# Patient Record
Sex: MALE | Race: WHITE | NOT HISPANIC OR LATINO | Employment: OTHER | URBAN - METROPOLITAN AREA
[De-identification: names, ages, dates, MRNs, and addresses within clinical notes are randomized per-mention and may not be internally consistent; named-entity substitution may affect disease eponyms.]

---

## 2017-01-24 RX ORDER — ATORVASTATIN CALCIUM 40 MG/1
TABLET, FILM COATED ORAL
Qty: 30 TABLET | Refills: 11 | Status: SHIPPED | OUTPATIENT
Start: 2017-01-24 | End: 2017-02-06 | Stop reason: SDUPTHER

## 2017-02-04 RX ORDER — PANTOPRAZOLE SODIUM 40 MG/1
TABLET, DELAYED RELEASE ORAL
Qty: 30 TABLET | Refills: 11 | Status: CANCELLED | OUTPATIENT
Start: 2017-02-04

## 2017-02-04 RX ORDER — METOPROLOL SUCCINATE 100 MG/1
TABLET, EXTENDED RELEASE ORAL
Qty: 30 TABLET | Refills: 11 | Status: CANCELLED | OUTPATIENT
Start: 2017-02-04

## 2017-02-04 RX ORDER — CITALOPRAM 10 MG/1
TABLET ORAL
Qty: 30 TABLET | Refills: 11 | Status: CANCELLED | OUTPATIENT
Start: 2017-02-04

## 2017-02-06 RX ORDER — PANTOPRAZOLE SODIUM 40 MG/1
40 TABLET, DELAYED RELEASE ORAL DAILY
Qty: 30 TABLET | Refills: 11 | Status: SHIPPED | OUTPATIENT
Start: 2017-02-06 | End: 2017-10-04

## 2017-02-06 RX ORDER — METOPROLOL SUCCINATE 100 MG/1
100 TABLET, EXTENDED RELEASE ORAL DAILY
Qty: 30 TABLET | Refills: 11 | Status: SHIPPED | OUTPATIENT
Start: 2017-02-06 | End: 2017-10-04 | Stop reason: SDUPTHER

## 2017-02-06 RX ORDER — CLOPIDOGREL BISULFATE 75 MG/1
75 TABLET ORAL DAILY
Qty: 30 TABLET | Refills: 11 | Status: SHIPPED | OUTPATIENT
Start: 2017-02-06 | End: 2017-10-04 | Stop reason: SDUPTHER

## 2017-02-06 RX ORDER — ATORVASTATIN CALCIUM 40 MG/1
40 TABLET, FILM COATED ORAL DAILY
Qty: 30 TABLET | Refills: 11 | Status: SHIPPED | OUTPATIENT
Start: 2017-02-06 | End: 2017-10-04 | Stop reason: SDUPTHER

## 2017-02-06 RX ORDER — CITALOPRAM 10 MG/1
10 TABLET ORAL DAILY
Qty: 30 TABLET | Refills: 11 | Status: SHIPPED | OUTPATIENT
Start: 2017-02-06 | End: 2017-10-04 | Stop reason: DRUGHIGH

## 2017-03-27 ENCOUNTER — OFFICE VISIT (OUTPATIENT)
Dept: INTERNAL MEDICINE | Facility: CLINIC | Age: 72
End: 2017-03-27

## 2017-03-27 VITALS
WEIGHT: 156 LBS | DIASTOLIC BLOOD PRESSURE: 76 MMHG | HEIGHT: 70 IN | BODY MASS INDEX: 22.33 KG/M2 | SYSTOLIC BLOOD PRESSURE: 142 MMHG

## 2017-03-27 DIAGNOSIS — F10.930 ALCOHOL WITHDRAWAL, UNCOMPLICATED (HCC): Primary | ICD-10-CM

## 2017-03-27 DIAGNOSIS — E87.1 HYPONATREMIA: Primary | ICD-10-CM

## 2017-03-27 DIAGNOSIS — F10.10 ALCOHOL ABUSE: ICD-10-CM

## 2017-03-27 LAB
ALBUMIN SERPL-MCNC: 4.7 G/DL (ref 3.2–4.8)
ALBUMIN/GLOB SERPL: 1.9 G/DL (ref 1.5–2.5)
ALP SERPL-CCNC: 69 U/L (ref 25–100)
ALT SERPL W P-5'-P-CCNC: 69 U/L (ref 7–40)
ANION GAP SERPL CALCULATED.3IONS-SCNC: 14 MMOL/L (ref 3–11)
AST SERPL-CCNC: 60 U/L (ref 0–33)
BILIRUB SERPL-MCNC: 1.7 MG/DL (ref 0.3–1.2)
BUN BLD-MCNC: 16 MG/DL (ref 9–23)
BUN/CREAT SERPL: 20 (ref 7–25)
CALCIUM SPEC-SCNC: 9.7 MG/DL (ref 8.7–10.4)
CHLORIDE SERPL-SCNC: 81 MMOL/L (ref 99–109)
CO2 SERPL-SCNC: 27 MMOL/L (ref 20–31)
CREAT BLD-MCNC: 0.8 MG/DL (ref 0.6–1.3)
DEPRECATED RDW RBC AUTO: 54.7 FL (ref 37–54)
ERYTHROCYTE [DISTWIDTH] IN BLOOD BY AUTOMATED COUNT: 18.5 % (ref 11.3–14.5)
GFR SERPL CREATININE-BSD FRML MDRD: 96 ML/MIN/1.73
GLOBULIN UR ELPH-MCNC: 2.5 GM/DL
GLUCOSE BLD-MCNC: 112 MG/DL (ref 70–100)
HCT VFR BLD AUTO: 36.2 % (ref 38.9–50.9)
HGB BLD-MCNC: 12.9 G/DL (ref 13.1–17.5)
MCH RBC QN AUTO: 28.9 PG (ref 27–31)
MCHC RBC AUTO-ENTMCNC: 35.6 G/DL (ref 32–36)
MCV RBC AUTO: 81.2 FL (ref 80–99)
PLATELET # BLD AUTO: 158 10*3/MM3 (ref 150–450)
PMV BLD AUTO: 9.7 FL (ref 6–12)
POTASSIUM BLD-SCNC: 3.7 MMOL/L (ref 3.5–5.5)
PROT SERPL-MCNC: 7.2 G/DL (ref 5.7–8.2)
RBC # BLD AUTO: 4.46 10*6/MM3 (ref 4.2–5.76)
SODIUM BLD-SCNC: 122 MMOL/L (ref 132–146)
WBC NRBC COR # BLD: 5.71 10*3/MM3 (ref 3.5–10.8)

## 2017-03-27 PROCEDURE — 85027 COMPLETE CBC AUTOMATED: CPT | Performed by: PHYSICIAN ASSISTANT

## 2017-03-27 PROCEDURE — 80053 COMPREHEN METABOLIC PANEL: CPT | Performed by: PHYSICIAN ASSISTANT

## 2017-03-27 PROCEDURE — 99213 OFFICE O/P EST LOW 20 MIN: CPT | Performed by: PHYSICIAN ASSISTANT

## 2017-03-27 PROCEDURE — 84425 ASSAY OF VITAMIN B-1: CPT | Performed by: PHYSICIAN ASSISTANT

## 2017-03-28 ENCOUNTER — LAB (OUTPATIENT)
Dept: INTERNAL MEDICINE | Facility: CLINIC | Age: 72
End: 2017-03-28

## 2017-03-28 DIAGNOSIS — E87.1 HYPONATREMIA: ICD-10-CM

## 2017-03-28 LAB
ANION GAP SERPL CALCULATED.3IONS-SCNC: 9 MMOL/L (ref 3–11)
BUN BLD-MCNC: 13 MG/DL (ref 9–23)
BUN/CREAT SERPL: 16.3 (ref 7–25)
CALCIUM SPEC-SCNC: 9.6 MG/DL (ref 8.7–10.4)
CHLORIDE SERPL-SCNC: 82 MMOL/L (ref 99–109)
CO2 SERPL-SCNC: 29 MMOL/L (ref 20–31)
CREAT BLD-MCNC: 0.8 MG/DL (ref 0.6–1.3)
GFR SERPL CREATININE-BSD FRML MDRD: 96 ML/MIN/1.73
GLUCOSE BLD-MCNC: 91 MG/DL (ref 70–100)
POTASSIUM BLD-SCNC: 3.7 MMOL/L (ref 3.5–5.5)
SODIUM BLD-SCNC: 120 MMOL/L (ref 132–146)

## 2017-03-28 PROCEDURE — 80048 BASIC METABOLIC PNL TOTAL CA: CPT | Performed by: PHYSICIAN ASSISTANT

## 2017-03-30 ENCOUNTER — LAB (OUTPATIENT)
Dept: INTERNAL MEDICINE | Facility: CLINIC | Age: 72
End: 2017-03-30

## 2017-03-30 DIAGNOSIS — E87.1 HYPONATREMIA: Primary | ICD-10-CM

## 2017-03-30 LAB
ANION GAP SERPL CALCULATED.3IONS-SCNC: 5 MMOL/L (ref 3–11)
BUN BLD-MCNC: 9 MG/DL (ref 9–23)
BUN/CREAT SERPL: 11.3 (ref 7–25)
CALCIUM SPEC-SCNC: 9.6 MG/DL (ref 8.7–10.4)
CHLORIDE SERPL-SCNC: 90 MMOL/L (ref 99–109)
CO2 SERPL-SCNC: 32 MMOL/L (ref 20–31)
CREAT BLD-MCNC: 0.8 MG/DL (ref 0.6–1.3)
GFR SERPL CREATININE-BSD FRML MDRD: 96 ML/MIN/1.73
GLUCOSE BLD-MCNC: 139 MG/DL (ref 70–100)
POTASSIUM BLD-SCNC: 3.3 MMOL/L (ref 3.5–5.5)
SODIUM BLD-SCNC: 127 MMOL/L (ref 132–146)
VIT B1 BLD-SCNC: 116 NMOL/L (ref 66.5–200)

## 2017-03-30 PROCEDURE — 80048 BASIC METABOLIC PNL TOTAL CA: CPT | Performed by: PHYSICIAN ASSISTANT

## 2017-04-05 ENCOUNTER — HOSPITAL ENCOUNTER (EMERGENCY)
Facility: HOSPITAL | Age: 72
Discharge: LEFT BEFORE DISCHARGE | End: 2017-04-05
Attending: EMERGENCY MEDICINE | Admitting: EMERGENCY MEDICINE

## 2017-04-05 VITALS
HEIGHT: 70 IN | SYSTOLIC BLOOD PRESSURE: 231 MMHG | RESPIRATION RATE: 18 BRPM | WEIGHT: 160 LBS | OXYGEN SATURATION: 99 % | DIASTOLIC BLOOD PRESSURE: 109 MMHG | TEMPERATURE: 97.7 F | HEART RATE: 55 BPM | BODY MASS INDEX: 22.9 KG/M2

## 2017-04-05 DIAGNOSIS — R55 SYNCOPE, UNSPECIFIED SYNCOPE TYPE: Primary | ICD-10-CM

## 2017-04-05 LAB
ALBUMIN SERPL-MCNC: 4.5 G/DL (ref 3.2–4.8)
ALBUMIN/GLOB SERPL: 1.8 G/DL (ref 1.5–2.5)
ALP SERPL-CCNC: 47 U/L (ref 25–100)
ALT SERPL W P-5'-P-CCNC: 37 U/L (ref 7–40)
ANION GAP SERPL CALCULATED.3IONS-SCNC: 2 MMOL/L (ref 3–11)
AST SERPL-CCNC: 29 U/L (ref 0–33)
BASOPHILS # BLD AUTO: 0.03 10*3/MM3 (ref 0–0.2)
BASOPHILS NFR BLD AUTO: 0.7 % (ref 0–1)
BILIRUB SERPL-MCNC: 0.8 MG/DL (ref 0.3–1.2)
BUN BLD-MCNC: 12 MG/DL (ref 9–23)
BUN/CREAT SERPL: 17.1 (ref 7–25)
CALCIUM SPEC-SCNC: 9.5 MG/DL (ref 8.7–10.4)
CHLORIDE SERPL-SCNC: 101 MMOL/L (ref 99–109)
CO2 SERPL-SCNC: 30 MMOL/L (ref 20–31)
CREAT BLD-MCNC: 0.7 MG/DL (ref 0.6–1.3)
DEPRECATED RDW RBC AUTO: 62.5 FL (ref 37–54)
EOSINOPHIL # BLD AUTO: 0.02 10*3/MM3 (ref 0.1–0.3)
EOSINOPHIL NFR BLD AUTO: 0.5 % (ref 0–3)
ERYTHROCYTE [DISTWIDTH] IN BLOOD BY AUTOMATED COUNT: 20.1 % (ref 11.3–14.5)
GFR SERPL CREATININE-BSD FRML MDRD: 111 ML/MIN/1.73
GLOBULIN UR ELPH-MCNC: 2.5 GM/DL
GLUCOSE BLD-MCNC: 101 MG/DL (ref 70–100)
GLUCOSE BLDC GLUCOMTR-MCNC: 106 MG/DL (ref 70–130)
HCT VFR BLD AUTO: 36 % (ref 38.9–50.9)
HGB BLD-MCNC: 12.2 G/DL (ref 13.1–17.5)
HOLD SPECIMEN: NORMAL
HOLD SPECIMEN: NORMAL
IMM GRANULOCYTES # BLD: 0.01 10*3/MM3 (ref 0–0.03)
IMM GRANULOCYTES NFR BLD: 0.2 % (ref 0–0.6)
LYMPHOCYTES # BLD AUTO: 0.49 10*3/MM3 (ref 0.6–4.8)
LYMPHOCYTES NFR BLD AUTO: 12.1 % (ref 24–44)
MAGNESIUM SERPL-MCNC: 1.9 MG/DL (ref 1.3–2.7)
MCH RBC QN AUTO: 29.1 PG (ref 27–31)
MCHC RBC AUTO-ENTMCNC: 33.9 G/DL (ref 32–36)
MCV RBC AUTO: 85.9 FL (ref 80–99)
MONOCYTES # BLD AUTO: 0.52 10*3/MM3 (ref 0–1)
MONOCYTES NFR BLD AUTO: 12.8 % (ref 0–12)
NEUTROPHILS # BLD AUTO: 2.98 10*3/MM3 (ref 1.5–8.3)
NEUTROPHILS NFR BLD AUTO: 73.7 % (ref 41–71)
PLATELET # BLD AUTO: 277 10*3/MM3 (ref 150–450)
PMV BLD AUTO: 8.7 FL (ref 6–12)
POTASSIUM BLD-SCNC: 3.7 MMOL/L (ref 3.5–5.5)
PROT SERPL-MCNC: 7 G/DL (ref 5.7–8.2)
RBC # BLD AUTO: 4.19 10*6/MM3 (ref 4.2–5.76)
SODIUM BLD-SCNC: 133 MMOL/L (ref 132–146)
TROPONIN I SERPL-MCNC: 0 NG/ML (ref 0–0.07)
WBC NRBC COR # BLD: 4.05 10*3/MM3 (ref 3.5–10.8)
WHOLE BLOOD HOLD SPECIMEN: NORMAL
WHOLE BLOOD HOLD SPECIMEN: NORMAL

## 2017-04-05 PROCEDURE — 99284 EMERGENCY DEPT VISIT MOD MDM: CPT

## 2017-04-05 PROCEDURE — 83735 ASSAY OF MAGNESIUM: CPT | Performed by: EMERGENCY MEDICINE

## 2017-04-05 PROCEDURE — 80053 COMPREHEN METABOLIC PANEL: CPT | Performed by: EMERGENCY MEDICINE

## 2017-04-05 PROCEDURE — 85025 COMPLETE CBC W/AUTO DIFF WBC: CPT | Performed by: EMERGENCY MEDICINE

## 2017-04-05 PROCEDURE — 84484 ASSAY OF TROPONIN QUANT: CPT

## 2017-04-05 PROCEDURE — 93005 ELECTROCARDIOGRAM TRACING: CPT | Performed by: EMERGENCY MEDICINE

## 2017-04-05 PROCEDURE — 82962 GLUCOSE BLOOD TEST: CPT

## 2017-04-05 RX ORDER — SODIUM CHLORIDE 0.9 % (FLUSH) 0.9 %
10 SYRINGE (ML) INJECTION AS NEEDED
Status: DISCONTINUED | OUTPATIENT
Start: 2017-04-05 | End: 2017-04-05 | Stop reason: HOSPADM

## 2017-04-06 ENCOUNTER — LAB (OUTPATIENT)
Dept: INTERNAL MEDICINE | Facility: CLINIC | Age: 72
End: 2017-04-06

## 2017-04-06 DIAGNOSIS — E87.1 HYPONATREMIA: ICD-10-CM

## 2017-04-06 DIAGNOSIS — F10.230 ALCOHOL DEPENDENCE WITH UNCOMPLICATED WITHDRAWAL (HCC): ICD-10-CM

## 2017-04-06 LAB
ANION GAP SERPL CALCULATED.3IONS-SCNC: 9 MMOL/L (ref 3–11)
BUN BLD-MCNC: 14 MG/DL (ref 9–23)
BUN/CREAT SERPL: 17.5 (ref 7–25)
CALCIUM SPEC-SCNC: 9.9 MG/DL (ref 8.7–10.4)
CHLORIDE SERPL-SCNC: 93 MMOL/L (ref 99–109)
CO2 SERPL-SCNC: 28 MMOL/L (ref 20–31)
CREAT BLD-MCNC: 0.8 MG/DL (ref 0.6–1.3)
GFR SERPL CREATININE-BSD FRML MDRD: 96 ML/MIN/1.73
GLUCOSE BLD-MCNC: 107 MG/DL (ref 70–100)
POTASSIUM BLD-SCNC: 4.1 MMOL/L (ref 3.5–5.5)
SODIUM BLD-SCNC: 130 MMOL/L (ref 132–146)

## 2017-04-06 PROCEDURE — 80048 BASIC METABOLIC PNL TOTAL CA: CPT | Performed by: PHYSICIAN ASSISTANT

## 2017-04-10 ENCOUNTER — OFFICE VISIT (OUTPATIENT)
Dept: INTERNAL MEDICINE | Facility: CLINIC | Age: 72
End: 2017-04-10

## 2017-04-10 VITALS
WEIGHT: 154 LBS | HEART RATE: 61 BPM | SYSTOLIC BLOOD PRESSURE: 130 MMHG | HEIGHT: 70 IN | DIASTOLIC BLOOD PRESSURE: 82 MMHG | BODY MASS INDEX: 22.05 KG/M2 | OXYGEN SATURATION: 99 %

## 2017-04-10 DIAGNOSIS — E87.1 HYPONATREMIA: Primary | ICD-10-CM

## 2017-04-10 DIAGNOSIS — Z11.59 NEED FOR HEPATITIS C SCREENING TEST: ICD-10-CM

## 2017-04-10 LAB
ANION GAP SERPL CALCULATED.3IONS-SCNC: 11 MMOL/L (ref 3–11)
BUN BLD-MCNC: 11 MG/DL (ref 9–23)
BUN/CREAT SERPL: 15.7 (ref 7–25)
CALCIUM SPEC-SCNC: 9.3 MG/DL (ref 8.7–10.4)
CHLORIDE SERPL-SCNC: 99 MMOL/L (ref 99–109)
CO2 SERPL-SCNC: 24 MMOL/L (ref 20–31)
CREAT BLD-MCNC: 0.7 MG/DL (ref 0.6–1.3)
GFR SERPL CREATININE-BSD FRML MDRD: 111 ML/MIN/1.73
GLUCOSE BLD-MCNC: 98 MG/DL (ref 70–100)
HCV AB SER DONR QL: NORMAL
POTASSIUM BLD-SCNC: 3.7 MMOL/L (ref 3.5–5.5)
SODIUM BLD-SCNC: 134 MMOL/L (ref 132–146)

## 2017-04-10 PROCEDURE — 80048 BASIC METABOLIC PNL TOTAL CA: CPT | Performed by: NURSE PRACTITIONER

## 2017-04-10 PROCEDURE — 86803 HEPATITIS C AB TEST: CPT | Performed by: NURSE PRACTITIONER

## 2017-04-10 PROCEDURE — 99213 OFFICE O/P EST LOW 20 MIN: CPT | Performed by: NURSE PRACTITIONER

## 2017-04-11 ENCOUNTER — TELEPHONE (OUTPATIENT)
Dept: INTERNAL MEDICINE | Facility: CLINIC | Age: 72
End: 2017-04-11

## 2017-10-04 ENCOUNTER — OFFICE VISIT (OUTPATIENT)
Dept: INTERNAL MEDICINE | Facility: CLINIC | Age: 72
End: 2017-10-04

## 2017-10-04 VITALS
RESPIRATION RATE: 16 BRPM | WEIGHT: 151 LBS | OXYGEN SATURATION: 97 % | HEART RATE: 56 BPM | BODY MASS INDEX: 21.62 KG/M2 | DIASTOLIC BLOOD PRESSURE: 64 MMHG | HEIGHT: 70 IN | SYSTOLIC BLOOD PRESSURE: 112 MMHG

## 2017-10-04 DIAGNOSIS — I10 ESSENTIAL HYPERTENSION: ICD-10-CM

## 2017-10-04 DIAGNOSIS — F10.930 WITHDRAWAL SYMPTOMS, ALCOHOL, UNCOMPLICATED (HCC): ICD-10-CM

## 2017-10-04 DIAGNOSIS — B37.0 CANDIDA, ORAL: ICD-10-CM

## 2017-10-04 DIAGNOSIS — R73.03 PREDIABETES: ICD-10-CM

## 2017-10-04 DIAGNOSIS — R45.89 SAD MOOD: ICD-10-CM

## 2017-10-04 DIAGNOSIS — E78.5 HYPERLIPIDEMIA, UNSPECIFIED HYPERLIPIDEMIA TYPE: ICD-10-CM

## 2017-10-04 DIAGNOSIS — Z86.718 HISTORY OF FEMORAL ARTERY THROMBOSIS: Primary | ICD-10-CM

## 2017-10-04 LAB
ALBUMIN SERPL-MCNC: 4 G/DL (ref 3.2–4.8)
ALBUMIN/GLOB SERPL: 1.8 G/DL (ref 1.5–2.5)
ALP SERPL-CCNC: 64 U/L (ref 25–100)
ALT SERPL W P-5'-P-CCNC: 60 U/L (ref 7–40)
ANION GAP SERPL CALCULATED.3IONS-SCNC: 2 MMOL/L (ref 3–11)
AST SERPL-CCNC: 51 U/L (ref 0–33)
BILIRUB SERPL-MCNC: 0.6 MG/DL (ref 0.3–1.2)
BUN BLD-MCNC: 6 MG/DL (ref 9–23)
BUN/CREAT SERPL: 8.6 (ref 7–25)
CALCIUM SPEC-SCNC: 8.9 MG/DL (ref 8.7–10.4)
CHLORIDE SERPL-SCNC: 94 MMOL/L (ref 99–109)
CO2 SERPL-SCNC: 29 MMOL/L (ref 20–31)
CREAT BLD-MCNC: 0.7 MG/DL (ref 0.6–1.3)
GFR SERPL CREATININE-BSD FRML MDRD: 111 ML/MIN/1.73
GLOBULIN UR ELPH-MCNC: 2.2 GM/DL
GLUCOSE BLD-MCNC: 90 MG/DL (ref 70–100)
HBA1C MFR BLD: 5.2 % (ref 4.8–5.6)
POTASSIUM BLD-SCNC: 4.2 MMOL/L (ref 3.5–5.5)
PROT SERPL-MCNC: 6.2 G/DL (ref 5.7–8.2)
SODIUM BLD-SCNC: 125 MMOL/L (ref 132–146)

## 2017-10-04 PROCEDURE — 99214 OFFICE O/P EST MOD 30 MIN: CPT | Performed by: NURSE PRACTITIONER

## 2017-10-04 PROCEDURE — 83036 HEMOGLOBIN GLYCOSYLATED A1C: CPT | Performed by: NURSE PRACTITIONER

## 2017-10-04 PROCEDURE — 80053 COMPREHEN METABOLIC PANEL: CPT | Performed by: NURSE PRACTITIONER

## 2017-10-04 RX ORDER — ATORVASTATIN CALCIUM 40 MG/1
40 TABLET, FILM COATED ORAL DAILY
Qty: 30 TABLET | Refills: 11 | Status: SHIPPED | OUTPATIENT
Start: 2017-10-04 | End: 2018-02-20 | Stop reason: SDUPTHER

## 2017-10-04 RX ORDER — CLOPIDOGREL BISULFATE 75 MG/1
75 TABLET ORAL DAILY
Qty: 30 TABLET | Refills: 11 | Status: SHIPPED | OUTPATIENT
Start: 2017-10-04 | End: 2018-11-08 | Stop reason: SDUPTHER

## 2017-10-04 RX ORDER — CLONIDINE HYDROCHLORIDE 0.1 MG/1
0.1 TABLET ORAL 4 TIMES DAILY
Qty: 120 TABLET | Refills: 11 | Status: SHIPPED | OUTPATIENT
Start: 2017-10-04 | End: 2018-07-23

## 2017-10-04 RX ORDER — METOPROLOL SUCCINATE 100 MG/1
100 TABLET, EXTENDED RELEASE ORAL DAILY
Qty: 30 TABLET | Refills: 11 | Status: SHIPPED | OUTPATIENT
Start: 2017-10-04 | End: 2018-03-14 | Stop reason: SDUPTHER

## 2017-10-04 RX ORDER — CLOTRIMAZOLE 10 MG/1
10 LOZENGE ORAL; TOPICAL
Qty: 140 TABLET | Refills: 2 | Status: SHIPPED | OUTPATIENT
Start: 2017-10-04 | End: 2018-06-25

## 2017-10-04 RX ORDER — CITALOPRAM 20 MG/1
20 TABLET ORAL DAILY
Qty: 30 TABLET | Refills: 11 | Status: SHIPPED | OUTPATIENT
Start: 2017-10-04 | End: 2018-11-01 | Stop reason: SDUPTHER

## 2017-10-04 RX ORDER — LORAZEPAM 1 MG/1
1 TABLET ORAL EVERY 8 HOURS PRN
Qty: 42 TABLET | Refills: 0 | Status: SHIPPED | OUTPATIENT
Start: 2017-10-04 | End: 2018-06-25

## 2017-10-05 DIAGNOSIS — E87.1 HYPONATREMIA: Primary | ICD-10-CM

## 2017-10-26 ENCOUNTER — OFFICE VISIT (OUTPATIENT)
Dept: CARDIAC SURGERY | Facility: CLINIC | Age: 72
End: 2017-10-26

## 2017-10-26 VITALS
WEIGHT: 152 LBS | TEMPERATURE: 98 F | DIASTOLIC BLOOD PRESSURE: 100 MMHG | SYSTOLIC BLOOD PRESSURE: 201 MMHG | HEART RATE: 58 BPM | BODY MASS INDEX: 21.76 KG/M2 | HEIGHT: 70 IN | OXYGEN SATURATION: 99 %

## 2017-10-26 DIAGNOSIS — I73.9 PVD (PERIPHERAL VASCULAR DISEASE) (HCC): Primary | ICD-10-CM

## 2017-10-26 DIAGNOSIS — I73.9 FEMORO-POPLITEAL ARTERY DISEASE (HCC): ICD-10-CM

## 2017-10-26 PROCEDURE — 99203 OFFICE O/P NEW LOW 30 MIN: CPT | Performed by: THORACIC SURGERY (CARDIOTHORACIC VASCULAR SURGERY)

## 2018-02-20 RX ORDER — ATORVASTATIN CALCIUM 40 MG/1
40 TABLET, FILM COATED ORAL DAILY
Qty: 30 TABLET | Refills: 1 | Status: SHIPPED | OUTPATIENT
Start: 2018-02-20 | End: 2018-04-01 | Stop reason: SDUPTHER

## 2018-03-07 ENCOUNTER — TELEPHONE (OUTPATIENT)
Dept: CARDIAC SURGERY | Facility: CLINIC | Age: 73
End: 2018-03-07

## 2018-03-12 ENCOUNTER — TELEPHONE (OUTPATIENT)
Dept: CARDIAC SURGERY | Facility: CLINIC | Age: 73
End: 2018-03-12

## 2018-03-12 DIAGNOSIS — I73.9 PVD (PERIPHERAL VASCULAR DISEASE) (HCC): Primary | ICD-10-CM

## 2018-03-14 ENCOUNTER — OFFICE VISIT (OUTPATIENT)
Dept: INTERNAL MEDICINE | Facility: CLINIC | Age: 73
End: 2018-03-14

## 2018-03-14 VITALS
DIASTOLIC BLOOD PRESSURE: 84 MMHG | BODY MASS INDEX: 22.99 KG/M2 | SYSTOLIC BLOOD PRESSURE: 148 MMHG | HEIGHT: 70 IN | HEART RATE: 61 BPM | OXYGEN SATURATION: 99 % | WEIGHT: 160.6 LBS

## 2018-03-14 DIAGNOSIS — E78.5 HYPERLIPIDEMIA, UNSPECIFIED HYPERLIPIDEMIA TYPE: ICD-10-CM

## 2018-03-14 DIAGNOSIS — I10 ESSENTIAL HYPERTENSION: ICD-10-CM

## 2018-03-14 DIAGNOSIS — K21.9 GASTROESOPHAGEAL REFLUX DISEASE, ESOPHAGITIS PRESENCE NOT SPECIFIED: Primary | ICD-10-CM

## 2018-03-14 DIAGNOSIS — R73.03 PREDIABETES: ICD-10-CM

## 2018-03-14 LAB
ARTICHOKE IGE QN: 52 MG/DL (ref 0–130)
CHOLEST SERPL-MCNC: 134 MG/DL (ref 0–200)
EXPIRATION DATE: NORMAL
GLUCOSE BLDC GLUCOMTR-MCNC: 95 MG/DL (ref 70–130)
HBA1C MFR BLD: 5.7 %
HDLC SERPL-MCNC: 77 MG/DL (ref 40–60)
Lab: NORMAL
TRIGL SERPL-MCNC: 56 MG/DL (ref 0–150)

## 2018-03-14 PROCEDURE — 80061 LIPID PANEL: CPT | Performed by: NURSE PRACTITIONER

## 2018-03-14 PROCEDURE — 83036 HEMOGLOBIN GLYCOSYLATED A1C: CPT | Performed by: NURSE PRACTITIONER

## 2018-03-14 PROCEDURE — 99214 OFFICE O/P EST MOD 30 MIN: CPT | Performed by: NURSE PRACTITIONER

## 2018-03-14 PROCEDURE — 82947 ASSAY GLUCOSE BLOOD QUANT: CPT | Performed by: NURSE PRACTITIONER

## 2018-03-14 RX ORDER — PANTOPRAZOLE SODIUM 40 MG/1
40 TABLET, DELAYED RELEASE ORAL DAILY
Qty: 30 TABLET | Refills: 11 | Status: SHIPPED | OUTPATIENT
Start: 2018-03-14 | End: 2018-10-25

## 2018-03-14 RX ORDER — PANTOPRAZOLE SODIUM 40 MG/1
TABLET, DELAYED RELEASE ORAL
COMMUNITY
Start: 2017-12-18 | End: 2018-03-14 | Stop reason: SDUPTHER

## 2018-03-14 RX ORDER — METOPROLOL SUCCINATE 100 MG/1
100 TABLET, EXTENDED RELEASE ORAL DAILY
Qty: 30 TABLET | Refills: 11 | Status: SHIPPED | OUTPATIENT
Start: 2018-03-14 | End: 2019-02-04 | Stop reason: SDUPTHER

## 2018-04-01 RX ORDER — ATORVASTATIN CALCIUM 40 MG/1
40 TABLET, FILM COATED ORAL DAILY
Qty: 90 TABLET | Refills: 1 | Status: SHIPPED | OUTPATIENT
Start: 2018-04-01 | End: 2018-07-23

## 2018-04-03 ENCOUNTER — HOSPITAL ENCOUNTER (OUTPATIENT)
Dept: CARDIOLOGY | Facility: HOSPITAL | Age: 73
Discharge: HOME | End: 2018-04-03
Admitting: PHYSICIAN ASSISTANT

## 2018-04-03 VITALS
WEIGHT: 160 LBS | SYSTOLIC BLOOD PRESSURE: 191 MMHG | HEIGHT: 70 IN | BODY MASS INDEX: 22.9 KG/M2 | DIASTOLIC BLOOD PRESSURE: 96 MMHG

## 2018-04-03 DIAGNOSIS — I73.9 PVD (PERIPHERAL VASCULAR DISEASE) (HCC): ICD-10-CM

## 2018-04-03 PROCEDURE — 93925 LOWER EXTREMITY STUDY: CPT | Performed by: INTERNAL MEDICINE

## 2018-04-03 PROCEDURE — 93925 LOWER EXTREMITY STUDY: CPT

## 2018-04-04 LAB
BH CV GRAFT BRACHIAL PRESSURE LEFT: 189 MMHG
BH CV GRAFT BRACHIAL PRESSURE RIGHT: 191 MMHG
BH CV LEA LEFT ANT TIBIAL A DISTAL PSV: 47 CM/S
BH CV LEA LEFT ANT TIBIAL A MID PSV: 180 CM/S
BH CV LEA LEFT ANT TIBIAL A PROX PSV: 88 CM/S
BH CV LEA LEFT CFA DISTAL PSV: 84 CM/S
BH CV LEA LEFT CFA PROX PSV: 74 CM/S
BH CV LEA LEFT DFA PROX PSV: 62 CM/S
BH CV LEA LEFT PERONEAL  MID PSV: 37 CM/S
BH CV LEA LEFT PERONEAL  PROX PSV: 45 CM/S
BH CV LEA LEFT POPITEAL A  DISTAL PSV: 347 CM/S
BH CV LEA LEFT POPITEAL A  MID PSV: 101 CM/S
BH CV LEA LEFT POPITEAL A  PROX PSV: 68 CM/S
BH CV LEA LEFT PTA DISTAL PSV: 60 CM/S
BH CV LEA LEFT PTA MID PSV: 70 CM/S
BH CV LEA LEFT PTA PRESSURE: 191 MMHG
BH CV LEA LEFT PTA PROX PSV: 63 CM/S
BH CV LEA LEFT SFA DISTAL PSV: 87 CM/S
BH CV LEA LEFT SFA MID PSV: 76 CM/S
BH CV LEA LEFT SFA PROX PSV: 85 CM/S
BH CV LEA RIGHT ANT TIBIAL A DISTAL PSV: 45 CM/S
BH CV LEA RIGHT ANT TIBIAL A MID PSV: 48 CM/S
BH CV LEA RIGHT ANT TIBIAL A PROX PSV: 79 CM/S
BH CV LEA RIGHT CFA DISTAL PSV: 156 CM/S
BH CV LEA RIGHT CFA PROX PSV: 116 CM/S
BH CV LEA RIGHT DFA PROX PSV: 109 CM/S
BH CV LEA RIGHT PERONEAL  MID PSV: 41 CM/S
BH CV LEA RIGHT PERONEAL  PROX PSV: 40 CM/S
BH CV LEA RIGHT POPITEAL A  DISTAL PSV: 87 CM/S
BH CV LEA RIGHT PTA DISTAL PSV: 57 CM/S
BH CV LEA RIGHT PTA MID PSV: 83 CM/S
BH CV LEA RIGHT PTA PRESSURE: 194 MMHG
BH CV LEA RIGHT PTA PROX PSV: 68 CM/S
BH CV LEA RIGHT SFA DISTAL PSV: 15 CM/S
BH CV LEA RIGHT SFA MID PSV: 102 CM/S
BH CV LEA RIGHT SFA PROX PSV: 131 CM/S
BH CV LOWER ARTERIAL LEFT ABI RATIO: 1
BH CV LOWER ARTERIAL RIGHT ABI RATIO: 1

## 2018-04-20 ENCOUNTER — OFFICE VISIT (OUTPATIENT)
Dept: CARDIAC SURGERY | Facility: CLINIC | Age: 73
End: 2018-04-20

## 2018-04-20 VITALS
HEART RATE: 58 BPM | DIASTOLIC BLOOD PRESSURE: 90 MMHG | SYSTOLIC BLOOD PRESSURE: 200 MMHG | TEMPERATURE: 97.7 F | HEIGHT: 70 IN | BODY MASS INDEX: 23.34 KG/M2 | WEIGHT: 163 LBS | OXYGEN SATURATION: 98 %

## 2018-04-20 DIAGNOSIS — I73.9 PVD (PERIPHERAL VASCULAR DISEASE) (HCC): Primary | ICD-10-CM

## 2018-04-20 DIAGNOSIS — I73.9 FEMORO-POPLITEAL ARTERY DISEASE (HCC): ICD-10-CM

## 2018-04-20 PROCEDURE — 99213 OFFICE O/P EST LOW 20 MIN: CPT | Performed by: THORACIC SURGERY (CARDIOTHORACIC VASCULAR SURGERY)

## 2018-06-25 ENCOUNTER — TELEPHONE (OUTPATIENT)
Dept: INTERNAL MEDICINE | Facility: CLINIC | Age: 73
End: 2018-06-25

## 2018-06-25 ENCOUNTER — OFFICE VISIT (OUTPATIENT)
Dept: INTERNAL MEDICINE | Facility: CLINIC | Age: 73
End: 2018-06-25

## 2018-06-25 VITALS
SYSTOLIC BLOOD PRESSURE: 144 MMHG | HEART RATE: 63 BPM | OXYGEN SATURATION: 98 % | HEIGHT: 70 IN | BODY MASS INDEX: 22.19 KG/M2 | DIASTOLIC BLOOD PRESSURE: 86 MMHG | WEIGHT: 155 LBS

## 2018-06-25 DIAGNOSIS — Z13.6 SCREENING FOR AAA (ABDOMINAL AORTIC ANEURYSM): ICD-10-CM

## 2018-06-25 DIAGNOSIS — F17.200 TOBACCO USE DISORDER: ICD-10-CM

## 2018-06-25 DIAGNOSIS — R63.4 RECENT UNEXPLAINED WEIGHT LOSS: ICD-10-CM

## 2018-06-25 DIAGNOSIS — Z12.11 SCREENING FOR COLON CANCER: ICD-10-CM

## 2018-06-25 DIAGNOSIS — Z87.891 PERSONAL HISTORY OF TOBACCO USE, PRESENTING HAZARDS TO HEALTH: ICD-10-CM

## 2018-06-25 DIAGNOSIS — I10 ESSENTIAL HYPERTENSION: ICD-10-CM

## 2018-06-25 DIAGNOSIS — Z13.6 SCREENING FOR AAA (AORTIC ABDOMINAL ANEURYSM): ICD-10-CM

## 2018-06-25 DIAGNOSIS — R73.03 PREDIABETES: Primary | ICD-10-CM

## 2018-06-25 DIAGNOSIS — J42 CHRONIC BRONCHITIS, UNSPECIFIED CHRONIC BRONCHITIS TYPE (HCC): ICD-10-CM

## 2018-06-25 LAB
GLUCOSE BLDC GLUCOMTR-MCNC: 88 MG/DL (ref 70–130)
HBA1C MFR BLD: 5.2 %

## 2018-06-25 PROCEDURE — 99214 OFFICE O/P EST MOD 30 MIN: CPT | Performed by: NURSE PRACTITIONER

## 2018-06-25 PROCEDURE — 82947 ASSAY GLUCOSE BLOOD QUANT: CPT | Performed by: NURSE PRACTITIONER

## 2018-06-25 PROCEDURE — 83036 HEMOGLOBIN GLYCOSYLATED A1C: CPT | Performed by: NURSE PRACTITIONER

## 2018-06-25 RX ORDER — LISINOPRIL 10 MG/1
10 TABLET ORAL DAILY
Qty: 30 TABLET | Refills: 2 | Status: SHIPPED | OUTPATIENT
Start: 2018-06-25 | End: 2018-08-08 | Stop reason: SDUPTHER

## 2018-06-28 ENCOUNTER — LAB (OUTPATIENT)
Dept: INTERNAL MEDICINE | Facility: CLINIC | Age: 73
End: 2018-06-28

## 2018-06-28 DIAGNOSIS — R63.4 RECENT UNEXPLAINED WEIGHT LOSS: ICD-10-CM

## 2018-06-28 LAB
ALBUMIN SERPL-MCNC: 4.24 G/DL (ref 3.2–4.8)
ALBUMIN/GLOB SERPL: 2 G/DL (ref 1.5–2.5)
ALP SERPL-CCNC: 71 U/L (ref 25–100)
ALT SERPL W P-5'-P-CCNC: 83 U/L (ref 7–40)
ANION GAP SERPL CALCULATED.3IONS-SCNC: 10 MMOL/L (ref 3–11)
AST SERPL-CCNC: 74 U/L (ref 0–33)
BASOPHILS # BLD AUTO: 0.03 10*3/MM3 (ref 0–0.2)
BASOPHILS NFR BLD AUTO: 1 % (ref 0–1)
BILIRUB SERPL-MCNC: 0.6 MG/DL (ref 0.3–1.2)
BUN BLD-MCNC: <5 MG/DL (ref 9–23)
BUN/CREAT SERPL: ABNORMAL (ref 7–25)
CALCIUM SPEC-SCNC: 8.8 MG/DL (ref 8.7–10.4)
CHLORIDE SERPL-SCNC: 98 MMOL/L (ref 99–109)
CO2 SERPL-SCNC: 26 MMOL/L (ref 20–31)
CREAT BLD-MCNC: 0.76 MG/DL (ref 0.6–1.3)
DEPRECATED RDW RBC AUTO: 54.9 FL (ref 37–54)
EOSINOPHIL # BLD AUTO: 0.05 10*3/MM3 (ref 0–0.3)
EOSINOPHIL NFR BLD AUTO: 1.7 % (ref 0–3)
ERYTHROCYTE [DISTWIDTH] IN BLOOD BY AUTOMATED COUNT: 17.6 % (ref 11.3–14.5)
GFR SERPL CREATININE-BSD FRML MDRD: 101 ML/MIN/1.73
GLOBULIN UR ELPH-MCNC: 2.2 GM/DL
GLUCOSE BLD-MCNC: 85 MG/DL (ref 70–100)
HCT VFR BLD AUTO: 37 % (ref 38.9–50.9)
HGB BLD-MCNC: 12.8 G/DL (ref 13.1–17.5)
IMM GRANULOCYTES # BLD: 0.01 10*3/MM3 (ref 0–0.03)
IMM GRANULOCYTES NFR BLD: 0.3 % (ref 0–0.6)
LYMPHOCYTES # BLD AUTO: 0.82 10*3/MM3 (ref 0.6–4.8)
LYMPHOCYTES NFR BLD AUTO: 28.6 % (ref 24–44)
MCH RBC QN AUTO: 29.6 PG (ref 27–31)
MCHC RBC AUTO-ENTMCNC: 34.6 G/DL (ref 32–36)
MCV RBC AUTO: 85.6 FL (ref 80–99)
MONOCYTES # BLD AUTO: 0.38 10*3/MM3 (ref 0–1)
MONOCYTES NFR BLD AUTO: 13.2 % (ref 0–12)
NEUTROPHILS # BLD AUTO: 1.59 10*3/MM3 (ref 1.5–8.3)
NEUTROPHILS NFR BLD AUTO: 55.5 % (ref 41–71)
PLATELET # BLD AUTO: 175 10*3/MM3 (ref 150–450)
PMV BLD AUTO: 10.3 FL (ref 6–12)
POTASSIUM BLD-SCNC: 4.3 MMOL/L (ref 3.5–5.5)
PROT SERPL-MCNC: 6.4 G/DL (ref 5.7–8.2)
RBC # BLD AUTO: 4.32 10*6/MM3 (ref 4.2–5.76)
SODIUM BLD-SCNC: 134 MMOL/L (ref 132–146)
WBC NRBC COR # BLD: 2.87 10*3/MM3 (ref 3.5–10.8)

## 2018-06-28 PROCEDURE — 80053 COMPREHEN METABOLIC PANEL: CPT | Performed by: NURSE PRACTITIONER

## 2018-06-28 PROCEDURE — 85025 COMPLETE CBC W/AUTO DIFF WBC: CPT | Performed by: NURSE PRACTITIONER

## 2018-06-29 DIAGNOSIS — R74.8 ELEVATED LIVER ENZYMES: Primary | ICD-10-CM

## 2018-07-16 ENCOUNTER — TELEPHONE (OUTPATIENT)
Dept: INTERNAL MEDICINE | Facility: CLINIC | Age: 73
End: 2018-07-16

## 2018-07-23 ENCOUNTER — OFFICE VISIT (OUTPATIENT)
Dept: INTERNAL MEDICINE | Facility: CLINIC | Age: 73
End: 2018-07-23

## 2018-07-23 ENCOUNTER — HOSPITAL ENCOUNTER (OUTPATIENT)
Dept: GENERAL RADIOLOGY | Facility: HOSPITAL | Age: 73
Discharge: HOME | End: 2018-07-23

## 2018-07-23 ENCOUNTER — HOSPITAL ENCOUNTER (OUTPATIENT)
Dept: ULTRASOUND IMAGING | Facility: HOSPITAL | Age: 73
Discharge: HOME | End: 2018-07-23
Admitting: NURSE PRACTITIONER

## 2018-07-23 VITALS
HEIGHT: 70 IN | BODY MASS INDEX: 21.9 KG/M2 | DIASTOLIC BLOOD PRESSURE: 86 MMHG | WEIGHT: 153 LBS | HEART RATE: 61 BPM | SYSTOLIC BLOOD PRESSURE: 148 MMHG | OXYGEN SATURATION: 99 %

## 2018-07-23 DIAGNOSIS — Z13.6 SCREENING FOR AAA (ABDOMINAL AORTIC ANEURYSM): ICD-10-CM

## 2018-07-23 DIAGNOSIS — Z13.6 SCREENING FOR AAA (AORTIC ABDOMINAL ANEURYSM): ICD-10-CM

## 2018-07-23 DIAGNOSIS — I10 ESSENTIAL HYPERTENSION: ICD-10-CM

## 2018-07-23 DIAGNOSIS — R73.03 PREDIABETES: ICD-10-CM

## 2018-07-23 DIAGNOSIS — R74.8 ELEVATED LIVER ENZYMES: Primary | ICD-10-CM

## 2018-07-23 DIAGNOSIS — Z87.891 PERSONAL HISTORY OF TOBACCO USE, PRESENTING HAZARDS TO HEALTH: ICD-10-CM

## 2018-07-23 DIAGNOSIS — F17.200 TOBACCO USE DISORDER: ICD-10-CM

## 2018-07-23 LAB
ALBUMIN SERPL-MCNC: 4.19 G/DL (ref 3.2–4.8)
ALBUMIN/GLOB SERPL: 2 G/DL (ref 1.5–2.5)
ALP SERPL-CCNC: 67 U/L (ref 25–100)
ALT SERPL W P-5'-P-CCNC: 33 U/L (ref 7–40)
ANION GAP SERPL CALCULATED.3IONS-SCNC: 11 MMOL/L (ref 3–11)
AST SERPL-CCNC: 40 U/L (ref 0–33)
BILIRUB SERPL-MCNC: 0.5 MG/DL (ref 0.3–1.2)
BUN BLD-MCNC: 5 MG/DL (ref 9–23)
BUN/CREAT SERPL: 7.5 (ref 7–25)
CALCIUM SPEC-SCNC: 9.2 MG/DL (ref 8.7–10.4)
CHLORIDE SERPL-SCNC: 92 MMOL/L (ref 99–109)
CO2 SERPL-SCNC: 25 MMOL/L (ref 20–31)
CREAT BLD-MCNC: 0.67 MG/DL (ref 0.6–1.3)
GFR SERPL CREATININE-BSD FRML MDRD: 117 ML/MIN/1.73
GLOBULIN UR ELPH-MCNC: 2.1 GM/DL
GLUCOSE BLD-MCNC: 86 MG/DL (ref 70–100)
POTASSIUM BLD-SCNC: 4.3 MMOL/L (ref 3.5–5.5)
PROT SERPL-MCNC: 6.3 G/DL (ref 5.7–8.2)
SODIUM BLD-SCNC: 128 MMOL/L (ref 132–146)

## 2018-07-23 PROCEDURE — 80053 COMPREHEN METABOLIC PANEL: CPT | Performed by: NURSE PRACTITIONER

## 2018-07-23 PROCEDURE — 99213 OFFICE O/P EST LOW 20 MIN: CPT | Performed by: NURSE PRACTITIONER

## 2018-07-23 PROCEDURE — 71046 X-RAY EXAM CHEST 2 VIEWS: CPT

## 2018-07-23 PROCEDURE — 76706 US ABDL AORTA SCREEN AAA: CPT

## 2018-07-23 RX ORDER — GLUCOSAMINE HCL 500 MG
TABLET ORAL
COMMUNITY
End: 2018-10-25

## 2018-07-23 RX ORDER — MULTIPLE VITAMINS W/ MINERALS TAB 9MG-400MCG
1 TAB ORAL DAILY
COMMUNITY
End: 2018-10-25

## 2018-08-01 ENCOUNTER — OFFICE VISIT (OUTPATIENT)
Dept: INTERNAL MEDICINE | Facility: CLINIC | Age: 73
End: 2018-08-01

## 2018-08-01 VITALS
BODY MASS INDEX: 21.9 KG/M2 | HEART RATE: 72 BPM | OXYGEN SATURATION: 99 % | HEIGHT: 70 IN | SYSTOLIC BLOOD PRESSURE: 166 MMHG | DIASTOLIC BLOOD PRESSURE: 100 MMHG | WEIGHT: 153 LBS

## 2018-08-01 DIAGNOSIS — E87.1 HYPONATREMIA: ICD-10-CM
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
## 2018-08-01 LAB
ANION GAP SERPL CALCULATED.3IONS-SCNC: 10 MMOL/L (ref 3–11)
BUN BLD-MCNC: 5 MG/DL (ref 9–23)
BUN/CREAT SERPL: 6.9 (ref 7–25)
CALCIUM SPEC-SCNC: 9.1 MG/DL (ref 8.7–10.4)
CHLORIDE SERPL-SCNC: 91 MMOL/L (ref 99–109)
CO2 SERPL-SCNC: 26 MMOL/L (ref 20–31)
CREAT BLD-MCNC: 0.72 MG/DL (ref 0.6–1.3)
GFR SERPL CREATININE-BSD FRML MDRD: 108 ML/MIN/1.73
GLUCOSE BLD-MCNC: 88 MG/DL (ref 70–100)
POTASSIUM BLD-SCNC: 4.2 MMOL/L (ref 3.5–5.5)
SODIUM BLD-SCNC: 127 MMOL/L (ref 132–146)

## 2018-08-01 PROCEDURE — 80048 BASIC METABOLIC PNL TOTAL CA: CPT | Performed by: NURSE PRACTITIONER

## 2018-08-01 PROCEDURE — 93000 ELECTROCARDIOGRAM COMPLETE: CPT | Performed by: NURSE PRACTITIONER

## 2018-08-01 PROCEDURE — 99214 OFFICE O/P EST MOD 30 MIN: CPT | Performed by: NURSE PRACTITIONER

## 2018-08-08 ENCOUNTER — HOSPITAL ENCOUNTER (OUTPATIENT)
Dept: PHYSICAL THERAPY | Facility: HOSPITAL | Age: 73
Setting detail: THERAPIES SERIES
Discharge: HOME | End: 2018-08-08
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
## 2018-08-08 DIAGNOSIS — R26.89 BALANCE DISORDER: ICD-10-CM

## 2018-08-08 DIAGNOSIS — R29.898 WEAKNESS OF BOTH LOWER EXTREMITIES: Primary | ICD-10-CM

## 2018-08-08 DIAGNOSIS — I10 ESSENTIAL HYPERTENSION: ICD-10-CM

## 2018-08-08 PROCEDURE — G8982 BODY POS GOAL STATUS: HCPCS

## 2018-08-08 PROCEDURE — G8981 BODY POS CURRENT STATUS: HCPCS

## 2018-08-08 PROCEDURE — 97161 PT EVAL LOW COMPLEX 20 MIN: CPT

## 2018-08-08 RX ORDER — LISINOPRIL 10 MG/1
10 TABLET ORAL DAILY
Qty: 90 TABLET | Refills: 0 | Status: SHIPPED | OUTPATIENT
Start: 2018-08-08 | End: 2018-11-26

## 2018-08-09 ENCOUNTER — HOSPITAL ENCOUNTER (OUTPATIENT)
Dept: GENERAL RADIOLOGY | Facility: HOSPITAL | Age: 73
End: 2018-08-09

## 2018-08-09 ENCOUNTER — HOSPITAL ENCOUNTER (OUTPATIENT)
Dept: MRI IMAGING | Facility: HOSPITAL | Age: 73
Discharge: HOME | End: 2018-08-09
Admitting: NURSE PRACTITIONER
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
## 2018-08-09 PROCEDURE — 70551 MRI BRAIN STEM W/O DYE: CPT

## 2018-08-10 RX ORDER — AMOXICILLIN 500 MG/1
500 CAPSULE ORAL 3 TIMES DAILY
Qty: 30 CAPSULE | Refills: 0 | Status: SHIPPED | OUTPATIENT
Start: 2018-08-10 | End: 2018-08-20

## 2018-08-13 ENCOUNTER — HOSPITAL ENCOUNTER (OUTPATIENT)
Dept: PHYSICAL THERAPY | Facility: HOSPITAL | Age: 73
Setting detail: THERAPIES SERIES
Discharge: HOME | End: 2018-08-13

## 2018-08-13 DIAGNOSIS — R29.898 WEAKNESS OF BOTH LOWER EXTREMITIES: ICD-10-CM
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
## 2018-08-13 DIAGNOSIS — R26.89 BALANCE DISORDER: ICD-10-CM

## 2018-08-13 PROCEDURE — 97110 THERAPEUTIC EXERCISES: CPT

## 2018-08-13 PROCEDURE — 97112 NEUROMUSCULAR REEDUCATION: CPT

## 2018-08-15 ENCOUNTER — HOSPITAL ENCOUNTER (OUTPATIENT)
Dept: PHYSICAL THERAPY | Facility: HOSPITAL | Age: 73
Setting detail: THERAPIES SERIES
Discharge: HOME | End: 2018-08-15

## 2018-08-15 DIAGNOSIS — R26.89 BALANCE DISORDER: ICD-10-CM

## 2018-08-15 DIAGNOSIS — R29.898 WEAKNESS OF BOTH LOWER EXTREMITIES: ICD-10-CM
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
## 2018-08-15 PROCEDURE — 97112 NEUROMUSCULAR REEDUCATION: CPT

## 2018-08-17 ENCOUNTER — OFFICE VISIT (OUTPATIENT)
Dept: INTERNAL MEDICINE | Facility: CLINIC | Age: 73
End: 2018-08-17

## 2018-08-17 VITALS
BODY MASS INDEX: 21.55 KG/M2 | OXYGEN SATURATION: 100 % | SYSTOLIC BLOOD PRESSURE: 134 MMHG | DIASTOLIC BLOOD PRESSURE: 84 MMHG | WEIGHT: 150.2 LBS | HEART RATE: 81 BPM

## 2018-08-17 DIAGNOSIS — J43.9 PULMONARY EMPHYSEMA, UNSPECIFIED EMPHYSEMA TYPE (HCC): Primary | ICD-10-CM

## 2018-08-17 DIAGNOSIS — E87.1 HYPONATREMIA: ICD-10-CM

## 2018-08-17 DIAGNOSIS — F10.29 ALCOHOL DEPENDENCE WITH UNSPECIFIED ALCOHOL-INDUCED DISORDER (HCC): ICD-10-CM

## 2018-08-17 LAB
ANION GAP SERPL CALCULATED.3IONS-SCNC: 6 MMOL/L (ref 3–11)
BUN BLD-MCNC: 5 MG/DL (ref 9–23)
BUN/CREAT SERPL: 7.4 (ref 7–25)
CALCIUM SPEC-SCNC: 9.2 MG/DL (ref 8.7–10.4)
CHLORIDE SERPL-SCNC: 95 MMOL/L (ref 99–109)
CO2 SERPL-SCNC: 27 MMOL/L (ref 20–31)
CREAT BLD-MCNC: 0.68 MG/DL (ref 0.6–1.3)
GFR SERPL CREATININE-BSD FRML MDRD: 115 ML/MIN/1.73
GLUCOSE BLD-MCNC: 91 MG/DL (ref 70–100)
MAGNESIUM SERPL-MCNC: 1.7 MG/DL (ref 1.3–2.7)
POTASSIUM BLD-SCNC: 3.9 MMOL/L (ref 3.5–5.5)
SODIUM BLD-SCNC: 128 MMOL/L (ref 132–146)
VIT B12 BLD-MCNC: 388 PG/ML (ref 211–911)

## 2018-08-17 PROCEDURE — 99213 OFFICE O/P EST LOW 20 MIN: CPT | Performed by: NURSE PRACTITIONER

## 2018-08-17 PROCEDURE — 82607 VITAMIN B-12: CPT | Performed by: NURSE PRACTITIONER

## 2018-08-17 PROCEDURE — 83735 ASSAY OF MAGNESIUM: CPT | Performed by: NURSE PRACTITIONER

## 2018-08-17 PROCEDURE — 84425 ASSAY OF VITAMIN B-1: CPT | Performed by: NURSE PRACTITIONER

## 2018-08-17 PROCEDURE — 80048 BASIC METABOLIC PNL TOTAL CA: CPT | Performed by: NURSE PRACTITIONER

## 2018-08-17 RX ORDER — ALBUTEROL SULFATE 90 UG/1
2 AEROSOL, METERED RESPIRATORY (INHALATION) EVERY 6 HOURS PRN
Qty: 1 INHALER | Refills: 2 | Status: SHIPPED | OUTPATIENT
Start: 2018-08-17 | End: 2018-10-25 | Stop reason: SDUPTHER

## 2018-08-20 ENCOUNTER — HOSPITAL ENCOUNTER (OUTPATIENT)
Dept: PHYSICAL THERAPY | Facility: HOSPITAL | Age: 73
Setting detail: THERAPIES SERIES
Discharge: HOME | End: 2018-08-20

## 2018-08-20 ENCOUNTER — HOSPITAL ENCOUNTER (EMERGENCY)
Facility: HOSPITAL | Age: 73
Discharge: LEFT BEFORE DISCHARGE | End: 2018-08-20
Attending: EMERGENCY MEDICINE | Admitting: EMERGENCY MEDICINE

## 2018-08-20 VITALS
OXYGEN SATURATION: 96 % | TEMPERATURE: 97.7 F | BODY MASS INDEX: 21.62 KG/M2 | RESPIRATION RATE: 16 BRPM | SYSTOLIC BLOOD PRESSURE: 114 MMHG | HEART RATE: 75 BPM | DIASTOLIC BLOOD PRESSURE: 61 MMHG | HEIGHT: 70 IN | WEIGHT: 151 LBS

## 2018-08-20 DIAGNOSIS — R55 SYNCOPE AND COLLAPSE: Primary | ICD-10-CM

## 2018-08-20 DIAGNOSIS — R29.898 WEAKNESS OF BOTH LOWER EXTREMITIES: ICD-10-CM
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
## 2018-08-20 DIAGNOSIS — R26.89 BALANCE DISORDER: ICD-10-CM

## 2018-08-20 PROCEDURE — 97110 THERAPEUTIC EXERCISES: CPT

## 2018-08-20 PROCEDURE — 93005 ELECTROCARDIOGRAM TRACING: CPT | Performed by: EMERGENCY MEDICINE

## 2018-08-20 PROCEDURE — 99284 EMERGENCY DEPT VISIT MOD MDM: CPT

## 2018-08-20 RX ORDER — SODIUM CHLORIDE 0.9 % (FLUSH) 0.9 %
10 SYRINGE (ML) INJECTION AS NEEDED
Status: DISCONTINUED | OUTPATIENT
Start: 2018-08-20 | End: 2018-08-20 | Stop reason: HOSPADM

## 2018-08-22 LAB — VIT B1 BLD-SCNC: 71.7 NMOL/L (ref 66.5–200)

## 2018-08-27 ENCOUNTER — HOSPITAL ENCOUNTER (OUTPATIENT)
Dept: PHYSICAL THERAPY | Facility: HOSPITAL | Age: 73
Discharge: HOME | End: 2018-08-27

## 2018-08-27 DIAGNOSIS — R26.89 BALANCE DISORDER: ICD-10-CM
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
## 2018-08-27 DIAGNOSIS — R29.898 WEAKNESS OF BOTH LOWER EXTREMITIES: ICD-10-CM

## 2018-08-28 ENCOUNTER — DOCUMENTATION (OUTPATIENT)
Dept: PHYSICAL THERAPY | Facility: HOSPITAL | Age: 73
End: 2018-08-28

## 2018-09-13 ENCOUNTER — TELEPHONE (OUTPATIENT)
Dept: INTERNAL MEDICINE | Facility: CLINIC | Age: 73
End: 2018-09-13

## 2018-09-13 DIAGNOSIS — R63.4 WEIGHT LOSS: Primary | ICD-10-CM

## 2018-09-18 ENCOUNTER — TELEPHONE (OUTPATIENT)
Dept: CARDIAC SURGERY | Facility: CLINIC | Age: 73
End: 2018-09-18

## 2018-09-21 ENCOUNTER — TELEPHONE (OUTPATIENT)
Dept: INTERNAL MEDICINE | Facility: CLINIC | Age: 73
End: 2018-09-21

## 2018-09-21 DIAGNOSIS — R26.81 UNSTEADY GAIT: Primary | ICD-10-CM

## 2018-10-02 ENCOUNTER — TRANSCRIBE ORDERS (OUTPATIENT)
Dept: ADMINISTRATIVE | Facility: HOSPITAL | Age: 73
End: 2018-10-02

## 2018-10-02 DIAGNOSIS — R63.4 LOSS OF WEIGHT: Primary | ICD-10-CM

## 2018-10-02 DIAGNOSIS — F10.10 ALCOHOL ABUSE: ICD-10-CM

## 2018-10-03 ENCOUNTER — HOSPITAL ENCOUNTER (OUTPATIENT)
Dept: PHYSICAL THERAPY | Facility: HOSPITAL | Age: 73
Setting detail: THERAPIES SERIES
Discharge: HOME | End: 2018-10-03

## 2018-10-03 DIAGNOSIS — R29.898 WEAKNESS OF BOTH LOWER EXTREMITIES: ICD-10-CM
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
## 2018-10-03 DIAGNOSIS — R26.81 UNSTEADY GAIT: Primary | ICD-10-CM

## 2018-10-03 DIAGNOSIS — R26.89 BALANCE DISORDER: ICD-10-CM

## 2018-10-03 PROCEDURE — G8978 MOBILITY CURRENT STATUS: HCPCS

## 2018-10-03 PROCEDURE — 97161 PT EVAL LOW COMPLEX 20 MIN: CPT

## 2018-10-03 PROCEDURE — G8979 MOBILITY GOAL STATUS: HCPCS

## 2018-10-08 ENCOUNTER — HOSPITAL ENCOUNTER (OUTPATIENT)
Dept: PHYSICAL THERAPY | Facility: HOSPITAL | Age: 73
Setting detail: THERAPIES SERIES
Discharge: HOME | End: 2018-10-08
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
## 2018-10-08 DIAGNOSIS — R26.89 BALANCE DISORDER: ICD-10-CM

## 2018-10-08 DIAGNOSIS — R29.898 WEAKNESS OF BOTH LOWER EXTREMITIES: ICD-10-CM

## 2018-10-08 DIAGNOSIS — R26.81 UNSTEADY GAIT: Primary | ICD-10-CM

## 2018-10-08 PROCEDURE — 97112 NEUROMUSCULAR REEDUCATION: CPT

## 2018-10-08 PROCEDURE — 97110 THERAPEUTIC EXERCISES: CPT

## 2018-10-15 ENCOUNTER — HOSPITAL ENCOUNTER (OUTPATIENT)
Dept: ULTRASOUND IMAGING | Facility: HOSPITAL | Age: 73
Discharge: HOME | End: 2018-10-15
Admitting: INTERNAL MEDICINE

## 2018-10-15 DIAGNOSIS — R63.4 LOSS OF WEIGHT: ICD-10-CM

## 2018-10-15 DIAGNOSIS — F10.10 ALCOHOL ABUSE: ICD-10-CM

## 2018-10-15 PROCEDURE — 76705 ECHO EXAM OF ABDOMEN: CPT

## 2018-10-17 ENCOUNTER — HOSPITAL ENCOUNTER (OUTPATIENT)
Dept: PHYSICAL THERAPY | Facility: HOSPITAL | Age: 73
Setting detail: THERAPIES SERIES
Discharge: HOME | End: 2018-10-17

## 2018-10-17 DIAGNOSIS — R26.89 BALANCE DISORDER: ICD-10-CM
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
## 2018-10-17 DIAGNOSIS — R29.898 WEAKNESS OF BOTH LOWER EXTREMITIES: ICD-10-CM

## 2018-10-17 DIAGNOSIS — R26.81 UNSTEADY GAIT: Primary | ICD-10-CM

## 2018-10-17 PROCEDURE — 97112 NEUROMUSCULAR REEDUCATION: CPT

## 2018-10-17 PROCEDURE — 97110 THERAPEUTIC EXERCISES: CPT

## 2018-10-24 ENCOUNTER — HOSPITAL ENCOUNTER (OUTPATIENT)
Dept: PHYSICAL THERAPY | Facility: HOSPITAL | Age: 73
Setting detail: THERAPIES SERIES
Discharge: HOME | End: 2018-10-24
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
## 2018-10-24 DIAGNOSIS — R26.89 BALANCE DISORDER: ICD-10-CM

## 2018-10-24 DIAGNOSIS — R26.81 UNSTEADY GAIT: Primary | ICD-10-CM

## 2018-10-24 DIAGNOSIS — R29.898 WEAKNESS OF BOTH LOWER EXTREMITIES: ICD-10-CM

## 2018-10-24 PROCEDURE — 97110 THERAPEUTIC EXERCISES: CPT

## 2018-10-24 PROCEDURE — 97112 NEUROMUSCULAR REEDUCATION: CPT

## 2018-10-25 ENCOUNTER — OFFICE VISIT (OUTPATIENT)
Dept: INTERNAL MEDICINE | Facility: CLINIC | Age: 73
End: 2018-10-25

## 2018-10-25 VITALS
HEART RATE: 86 BPM | BODY MASS INDEX: 20.63 KG/M2 | DIASTOLIC BLOOD PRESSURE: 62 MMHG | OXYGEN SATURATION: 99 % | WEIGHT: 143.8 LBS | SYSTOLIC BLOOD PRESSURE: 118 MMHG

## 2018-10-25 DIAGNOSIS — Z91.09 ENVIRONMENTAL ALLERGIES: ICD-10-CM

## 2018-10-25 DIAGNOSIS — J43.9 PULMONARY EMPHYSEMA, UNSPECIFIED EMPHYSEMA TYPE (HCC): ICD-10-CM

## 2018-10-25 DIAGNOSIS — R63.4 WEIGHT LOSS: Primary | ICD-10-CM

## 2018-10-25 LAB
ALBUMIN SERPL-MCNC: 4.01 G/DL (ref 3.2–4.8)
ALBUMIN/GLOB SERPL: 2.5 G/DL (ref 1.5–2.5)
ALP SERPL-CCNC: 63 U/L (ref 25–100)
ALT SERPL W P-5'-P-CCNC: 26 U/L (ref 7–40)
ANION GAP SERPL CALCULATED.3IONS-SCNC: 9 MMOL/L (ref 3–11)
AST SERPL-CCNC: 29 U/L (ref 0–33)
BASOPHILS # BLD AUTO: 0.03 10*3/MM3 (ref 0–0.2)
BASOPHILS NFR BLD AUTO: 0.4 % (ref 0–1)
BILIRUB SERPL-MCNC: 0.7 MG/DL (ref 0.3–1.2)
BUN BLD-MCNC: 5 MG/DL (ref 9–23)
BUN/CREAT SERPL: 8.6 (ref 7–25)
CALCIUM SPEC-SCNC: 8.7 MG/DL (ref 8.7–10.4)
CHLORIDE SERPL-SCNC: 94 MMOL/L (ref 99–109)
CO2 SERPL-SCNC: 25 MMOL/L (ref 20–31)
CREAT BLD-MCNC: 0.58 MG/DL (ref 0.6–1.3)
DEPRECATED RDW RBC AUTO: 46.4 FL (ref 37–54)
EOSINOPHIL # BLD AUTO: 0.02 10*3/MM3 (ref 0–0.3)
EOSINOPHIL NFR BLD AUTO: 0.3 % (ref 0–3)
ERYTHROCYTE [DISTWIDTH] IN BLOOD BY AUTOMATED COUNT: 13.2 % (ref 11.3–14.5)
GFR SERPL CREATININE-BSD FRML MDRD: 138 ML/MIN/1.73
GLOBULIN UR ELPH-MCNC: 1.6 GM/DL
GLUCOSE BLD-MCNC: 82 MG/DL (ref 70–100)
HCT VFR BLD AUTO: 39.6 % (ref 38.9–50.9)
HGB BLD-MCNC: 13.5 G/DL (ref 13.1–17.5)
IMM GRANULOCYTES # BLD: 0.01 10*3/MM3 (ref 0–0.03)
IMM GRANULOCYTES NFR BLD: 0.1 % (ref 0–0.6)
LYMPHOCYTES # BLD AUTO: 0.86 10*3/MM3 (ref 0.6–4.8)
LYMPHOCYTES NFR BLD AUTO: 11.8 % (ref 24–44)
MCH RBC QN AUTO: 32.8 PG (ref 27–31)
MCHC RBC AUTO-ENTMCNC: 34.1 G/DL (ref 32–36)
MCV RBC AUTO: 96.1 FL (ref 80–99)
MONOCYTES # BLD AUTO: 0.91 10*3/MM3 (ref 0–1)
MONOCYTES NFR BLD AUTO: 12.5 % (ref 0–12)
NEUTROPHILS # BLD AUTO: 5.44 10*3/MM3 (ref 1.5–8.3)
NEUTROPHILS NFR BLD AUTO: 75 % (ref 41–71)
PLATELET # BLD AUTO: 286 10*3/MM3 (ref 150–450)
PMV BLD AUTO: 9.9 FL (ref 6–12)
POTASSIUM BLD-SCNC: 4.3 MMOL/L (ref 3.5–5.5)
PROT SERPL-MCNC: 5.6 G/DL (ref 5.7–8.2)
RBC # BLD AUTO: 4.12 10*6/MM3 (ref 4.2–5.76)
SODIUM BLD-SCNC: 128 MMOL/L (ref 132–146)
WBC NRBC COR # BLD: 7.26 10*3/MM3 (ref 3.5–10.8)

## 2018-10-25 PROCEDURE — 85025 COMPLETE CBC W/AUTO DIFF WBC: CPT | Performed by: NURSE PRACTITIONER

## 2018-10-25 PROCEDURE — 80053 COMPREHEN METABOLIC PANEL: CPT | Performed by: NURSE PRACTITIONER

## 2018-10-25 PROCEDURE — 99213 OFFICE O/P EST LOW 20 MIN: CPT | Performed by: NURSE PRACTITIONER

## 2018-10-25 RX ORDER — ALBUTEROL SULFATE 90 UG/1
2 AEROSOL, METERED RESPIRATORY (INHALATION) EVERY 6 HOURS PRN
Qty: 1 INHALER | Refills: 2 | Status: SHIPPED | OUTPATIENT
Start: 2018-10-25 | End: 2019-02-04 | Stop reason: SDUPTHER

## 2018-10-25 RX ORDER — MONTELUKAST SODIUM 10 MG/1
10 TABLET ORAL NIGHTLY
Qty: 30 TABLET | Refills: 2 | Status: SHIPPED | OUTPATIENT
Start: 2018-10-25 | End: 2019-02-04 | Stop reason: SDUPTHER

## 2018-10-25 RX ORDER — FLUTICASONE PROPIONATE 50 MCG
2 SPRAY, SUSPENSION (ML) NASAL DAILY
COMMUNITY
End: 2019-10-03 | Stop reason: ALTCHOICE

## 2018-10-26 ENCOUNTER — HOSPITAL ENCOUNTER (OUTPATIENT)
Dept: PHYSICAL THERAPY | Facility: HOSPITAL | Age: 73
Setting detail: THERAPIES SERIES
Discharge: HOME | End: 2018-10-26
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
## 2018-10-26 DIAGNOSIS — R26.81 UNSTEADY GAIT: Primary | ICD-10-CM

## 2018-10-26 DIAGNOSIS — R29.898 WEAKNESS OF BOTH LOWER EXTREMITIES: ICD-10-CM

## 2018-10-26 DIAGNOSIS — R26.89 BALANCE DISORDER: ICD-10-CM

## 2018-10-26 PROCEDURE — 97112 NEUROMUSCULAR REEDUCATION: CPT

## 2018-10-26 PROCEDURE — 97110 THERAPEUTIC EXERCISES: CPT

## 2018-10-31 ENCOUNTER — HOSPITAL ENCOUNTER (OUTPATIENT)
Dept: PHYSICAL THERAPY | Facility: HOSPITAL | Age: 73
Setting detail: THERAPIES SERIES
Discharge: HOME | End: 2018-10-31

## 2018-10-31 DIAGNOSIS — R26.89 BALANCE DISORDER: ICD-10-CM

## 2018-10-31 DIAGNOSIS — R29.898 WEAKNESS OF BOTH LOWER EXTREMITIES: ICD-10-CM
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
## 2018-10-31 DIAGNOSIS — R26.81 UNSTEADY GAIT: Primary | ICD-10-CM

## 2018-10-31 PROCEDURE — 97112 NEUROMUSCULAR REEDUCATION: CPT

## 2018-10-31 PROCEDURE — 97110 THERAPEUTIC EXERCISES: CPT

## 2018-11-01 ENCOUNTER — OFFICE VISIT (OUTPATIENT)
Dept: CARDIAC SURGERY | Facility: CLINIC | Age: 73
End: 2018-11-01

## 2018-11-01 VITALS
HEIGHT: 70 IN | OXYGEN SATURATION: 98 % | WEIGHT: 147 LBS | DIASTOLIC BLOOD PRESSURE: 100 MMHG | SYSTOLIC BLOOD PRESSURE: 200 MMHG | BODY MASS INDEX: 21.05 KG/M2 | TEMPERATURE: 98.1 F | HEART RATE: 74 BPM

## 2018-11-01 DIAGNOSIS — R45.89 SAD MOOD: ICD-10-CM

## 2018-11-01 DIAGNOSIS — I73.9 PERIPHERAL VASCULAR DISEASE (HCC): Primary | ICD-10-CM

## 2018-11-01 DIAGNOSIS — I73.9 FEMORO-POPLITEAL ARTERY DISEASE (HCC): ICD-10-CM

## 2018-11-01 PROCEDURE — 99213 OFFICE O/P EST LOW 20 MIN: CPT | Performed by: THORACIC SURGERY (CARDIOTHORACIC VASCULAR SURGERY)

## 2018-11-01 RX ORDER — CITALOPRAM 20 MG/1
20 TABLET ORAL DAILY
Qty: 30 TABLET | Refills: 2 | Status: SHIPPED | OUTPATIENT
Start: 2018-11-01 | End: 2018-11-26

## 2018-11-01 RX ORDER — CITALOPRAM 20 MG/1
TABLET ORAL
Qty: 30 TABLET | Refills: 11 | Status: CANCELLED | OUTPATIENT
Start: 2018-11-01

## 2018-11-02 ENCOUNTER — HOSPITAL ENCOUNTER (OUTPATIENT)
Dept: PHYSICAL THERAPY | Facility: HOSPITAL | Age: 73
Setting detail: THERAPIES SERIES
Discharge: HOME | End: 2018-11-02

## 2018-11-02 DIAGNOSIS — R26.89 BALANCE DISORDER: ICD-10-CM

## 2018-11-02 DIAGNOSIS — R29.898 WEAKNESS OF BOTH LOWER EXTREMITIES: ICD-10-CM
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
## 2018-11-02 DIAGNOSIS — R26.81 UNSTEADY GAIT: Primary | ICD-10-CM

## 2018-11-02 PROCEDURE — G8980 MOBILITY D/C STATUS: HCPCS

## 2018-11-02 PROCEDURE — 97112 NEUROMUSCULAR REEDUCATION: CPT

## 2018-11-02 PROCEDURE — G8979 MOBILITY GOAL STATUS: HCPCS

## 2018-11-02 PROCEDURE — G8978 MOBILITY CURRENT STATUS: HCPCS

## 2018-11-08 ENCOUNTER — LAB (OUTPATIENT)
Dept: LAB | Facility: HOSPITAL | Age: 73
End: 2018-11-08

## 2018-11-08 ENCOUNTER — TRANSCRIBE ORDERS (OUTPATIENT)
Dept: LAB | Facility: HOSPITAL | Age: 73
End: 2018-11-08

## 2018-11-08 DIAGNOSIS — N18.9 CHRONIC KIDNEY DISEASE, UNSPECIFIED CKD STAGE: ICD-10-CM

## 2018-11-08 DIAGNOSIS — N18.9 CHRONIC KIDNEY DISEASE, UNSPECIFIED CKD STAGE: Primary | ICD-10-CM

## 2018-11-08 LAB
ALBUMIN SERPL-MCNC: 4.23 G/DL (ref 3.2–4.8)
ANION GAP SERPL CALCULATED.3IONS-SCNC: 8 MMOL/L (ref 3–11)
BACTERIA UR QL AUTO: NORMAL /HPF
BASOPHILS # BLD AUTO: 0.03 10*3/MM3 (ref 0–0.2)
BASOPHILS NFR BLD AUTO: 0.6 % (ref 0–1)
BILIRUB UR QL STRIP: NEGATIVE
BUN BLD-MCNC: <5 MG/DL (ref 9–23)
BUN/CREAT SERPL: ABNORMAL (ref 7–25)
CALCIUM SPEC-SCNC: 8.7 MG/DL (ref 8.7–10.4)
CHLORIDE SERPL-SCNC: 93 MMOL/L (ref 99–109)
CLARITY UR: CLEAR
CO2 SERPL-SCNC: 26 MMOL/L (ref 20–31)
COLOR UR: YELLOW
CREAT BLD-MCNC: 0.55 MG/DL (ref 0.6–1.3)
CREAT UR-MCNC: 11.9 MG/DL
DEPRECATED RDW RBC AUTO: 45.8 FL (ref 37–54)
EOSINOPHIL # BLD AUTO: 0.04 10*3/MM3 (ref 0–0.3)
EOSINOPHIL NFR BLD AUTO: 0.8 % (ref 0–3)
ERYTHROCYTE [DISTWIDTH] IN BLOOD BY AUTOMATED COUNT: 13.2 % (ref 11.3–14.5)
GFR SERPL CREATININE-BSD FRML MDRD: 146 ML/MIN/1.73
GLUCOSE BLD-MCNC: 69 MG/DL (ref 70–100)
GLUCOSE UR STRIP-MCNC: NEGATIVE MG/DL
HCT VFR BLD AUTO: 39.3 % (ref 38.9–50.9)
HGB BLD-MCNC: 13.2 G/DL (ref 13.1–17.5)
HGB UR QL STRIP.AUTO: NEGATIVE
HYALINE CASTS UR QL AUTO: NORMAL /LPF
IMM GRANULOCYTES # BLD: 0.02 10*3/MM3 (ref 0–0.03)
IMM GRANULOCYTES NFR BLD: 0.4 % (ref 0–0.6)
KETONES UR QL STRIP: NEGATIVE
LEUKOCYTE ESTERASE UR QL STRIP.AUTO: NEGATIVE
LYMPHOCYTES # BLD AUTO: 1.1 10*3/MM3 (ref 0.6–4.8)
LYMPHOCYTES NFR BLD AUTO: 21.1 % (ref 24–44)
MCH RBC QN AUTO: 31.7 PG (ref 27–31)
MCHC RBC AUTO-ENTMCNC: 33.6 G/DL (ref 32–36)
MCV RBC AUTO: 94.5 FL (ref 80–99)
MONOCYTES # BLD AUTO: 0.65 10*3/MM3 (ref 0–1)
MONOCYTES NFR BLD AUTO: 12.5 % (ref 0–12)
NEUTROPHILS # BLD AUTO: 3.39 10*3/MM3 (ref 1.5–8.3)
NEUTROPHILS NFR BLD AUTO: 65 % (ref 41–71)
NITRITE UR QL STRIP: NEGATIVE
PH UR STRIP.AUTO: 7 [PH] (ref 5–8)
PHOSPHATE SERPL-MCNC: 4.2 MG/DL (ref 2.4–5.1)
PLATELET # BLD AUTO: 329 10*3/MM3 (ref 150–450)
PMV BLD AUTO: 9.7 FL (ref 6–12)
POTASSIUM BLD-SCNC: 4.2 MMOL/L (ref 3.5–5.5)
PROT UR QL STRIP: NEGATIVE
PROT UR-MCNC: 2 MG/DL (ref 1–14)
RBC # BLD AUTO: 4.16 10*6/MM3 (ref 4.2–5.76)
RBC # UR: NORMAL /HPF
REF LAB TEST METHOD: NORMAL
SODIUM BLD-SCNC: 127 MMOL/L (ref 132–146)
SP GR UR STRIP: 1.01 (ref 1–1.03)
SQUAMOUS #/AREA URNS HPF: NORMAL /HPF
UROBILINOGEN UR QL STRIP: NORMAL
WBC NRBC COR # BLD: 5.21 10*3/MM3 (ref 3.5–10.8)
WBC UR QL AUTO: NORMAL /HPF

## 2018-11-08 PROCEDURE — 85025 COMPLETE CBC W/AUTO DIFF WBC: CPT

## 2018-11-08 PROCEDURE — 36415 COLL VENOUS BLD VENIPUNCTURE: CPT

## 2018-11-08 PROCEDURE — 80069 RENAL FUNCTION PANEL: CPT

## 2018-11-08 PROCEDURE — 82570 ASSAY OF URINE CREATININE: CPT

## 2018-11-08 PROCEDURE — 84156 ASSAY OF PROTEIN URINE: CPT

## 2018-11-08 PROCEDURE — 81001 URINALYSIS AUTO W/SCOPE: CPT | Performed by: INTERNAL MEDICINE

## 2018-11-08 RX ORDER — CLOPIDOGREL BISULFATE 75 MG/1
TABLET ORAL
Qty: 30 TABLET | Refills: 2 | Status: SHIPPED | OUTPATIENT
Start: 2018-11-08 | End: 2018-11-09 | Stop reason: SDUPTHER

## 2018-11-09 ENCOUNTER — APPOINTMENT (OUTPATIENT)
Dept: PHYSICAL THERAPY | Facility: HOSPITAL | Age: 73
End: 2018-11-09

## 2018-11-09 RX ORDER — CLOPIDOGREL BISULFATE 75 MG/1
TABLET ORAL
Qty: 90 TABLET | Refills: 1 | Status: SHIPPED | OUTPATIENT
Start: 2018-11-09 | End: 2019-02-04 | Stop reason: SDUPTHER

## 2018-11-26 ENCOUNTER — OFFICE VISIT (OUTPATIENT)
Dept: INTERNAL MEDICINE | Facility: CLINIC | Age: 73
End: 2018-11-26

## 2018-11-26 VITALS
BODY MASS INDEX: 22.05 KG/M2 | SYSTOLIC BLOOD PRESSURE: 180 MMHG | WEIGHT: 154 LBS | HEIGHT: 70 IN | HEART RATE: 72 BPM | DIASTOLIC BLOOD PRESSURE: 92 MMHG

## 2018-11-26 DIAGNOSIS — E87.1 HYPONATREMIA: ICD-10-CM

## 2018-11-26 DIAGNOSIS — I10 ESSENTIAL HYPERTENSION: Primary | ICD-10-CM

## 2018-11-26 DIAGNOSIS — I10 ESSENTIAL HYPERTENSION: ICD-10-CM

## 2018-11-26 PROCEDURE — 99213 OFFICE O/P EST LOW 20 MIN: CPT | Performed by: NURSE PRACTITIONER

## 2018-11-26 RX ORDER — ZOSTER VACCINE RECOMBINANT, ADJUVANTED 50 MCG/0.5
KIT INTRAMUSCULAR
COMMUNITY
Start: 2018-08-28 | End: 2018-11-26

## 2018-11-26 RX ORDER — LISINOPRIL 10 MG/1
TABLET ORAL
Qty: 90 TABLET | Refills: 0 | OUTPATIENT
Start: 2018-11-26

## 2018-11-26 RX ORDER — LISINOPRIL 20 MG/1
20 TABLET ORAL DAILY
Qty: 30 TABLET | Refills: 2 | Status: SHIPPED | OUTPATIENT
Start: 2018-11-26 | End: 2019-01-07 | Stop reason: SDUPTHER

## 2019-01-07 ENCOUNTER — OFFICE VISIT (OUTPATIENT)
Dept: INTERNAL MEDICINE | Facility: CLINIC | Age: 74
End: 2019-01-07

## 2019-01-07 VITALS
SYSTOLIC BLOOD PRESSURE: 192 MMHG | DIASTOLIC BLOOD PRESSURE: 102 MMHG | HEART RATE: 80 BPM | WEIGHT: 157 LBS | TEMPERATURE: 97.7 F | BODY MASS INDEX: 22.48 KG/M2 | HEIGHT: 70 IN

## 2019-01-07 DIAGNOSIS — I10 ESSENTIAL HYPERTENSION: ICD-10-CM

## 2019-01-07 DIAGNOSIS — R73.9 HYPERGLYCEMIA: Primary | ICD-10-CM

## 2019-01-07 PROCEDURE — 99213 OFFICE O/P EST LOW 20 MIN: CPT | Performed by: NURSE PRACTITIONER

## 2019-01-07 RX ORDER — LISINOPRIL 20 MG/1
20 TABLET ORAL 2 TIMES DAILY
Qty: 60 TABLET | Refills: 2 | Status: SHIPPED | OUTPATIENT
Start: 2019-01-07 | End: 2019-02-04 | Stop reason: SDUPTHER

## 2019-01-07 RX ORDER — AMLODIPINE BESYLATE 5 MG/1
5 TABLET ORAL DAILY
Qty: 30 TABLET | Refills: 1 | Status: SHIPPED | OUTPATIENT
Start: 2019-01-07 | End: 2019-01-18 | Stop reason: DRUGHIGH

## 2019-01-08 ENCOUNTER — TELEPHONE (OUTPATIENT)
Dept: INTERNAL MEDICINE | Facility: CLINIC | Age: 74
End: 2019-01-08

## 2019-01-08 DIAGNOSIS — I10 ESSENTIAL HYPERTENSION: Primary | ICD-10-CM

## 2019-01-11 ENCOUNTER — LAB (OUTPATIENT)
Dept: INTERNAL MEDICINE | Facility: CLINIC | Age: 74
End: 2019-01-11

## 2019-01-11 DIAGNOSIS — I10 ESSENTIAL HYPERTENSION: ICD-10-CM

## 2019-01-11 LAB
ALBUMIN SERPL-MCNC: 4.61 G/DL (ref 3.2–4.8)
ALBUMIN/GLOB SERPL: 2.6 G/DL (ref 1.5–2.5)
ALP SERPL-CCNC: 60 U/L (ref 25–100)
ALT SERPL W P-5'-P-CCNC: 26 U/L (ref 7–40)
ANION GAP SERPL CALCULATED.3IONS-SCNC: 9 MMOL/L (ref 3–11)
AST SERPL-CCNC: 25 U/L (ref 0–33)
BILIRUB SERPL-MCNC: 0.9 MG/DL (ref 0.3–1.2)
BUN BLD-MCNC: 10 MG/DL (ref 9–23)
BUN/CREAT SERPL: 13.2 (ref 7–25)
CALCIUM SPEC-SCNC: 10.8 MG/DL (ref 8.7–10.4)
CHLORIDE SERPL-SCNC: 95 MMOL/L (ref 99–109)
CO2 SERPL-SCNC: 27 MMOL/L (ref 20–31)
CREAT BLD-MCNC: 0.76 MG/DL (ref 0.6–1.3)
GFR SERPL CREATININE-BSD FRML MDRD: 101 ML/MIN/1.73
GLOBULIN UR ELPH-MCNC: 1.8 GM/DL
GLUCOSE BLD-MCNC: 118 MG/DL (ref 70–100)
HBA1C MFR BLD: 5.1 % (ref 4.8–5.6)
POTASSIUM BLD-SCNC: 4.3 MMOL/L (ref 3.5–5.5)
PROT SERPL-MCNC: 6.4 G/DL (ref 5.7–8.2)
SODIUM BLD-SCNC: 131 MMOL/L (ref 132–146)

## 2019-01-11 PROCEDURE — 83036 HEMOGLOBIN GLYCOSYLATED A1C: CPT | Performed by: NURSE PRACTITIONER

## 2019-01-11 PROCEDURE — 80053 COMPREHEN METABOLIC PANEL: CPT | Performed by: NURSE PRACTITIONER

## 2019-01-17 ENCOUNTER — APPOINTMENT (OUTPATIENT)
Dept: LAB | Facility: HOSPITAL | Age: 74
End: 2019-01-17

## 2019-01-17 ENCOUNTER — TRANSCRIBE ORDERS (OUTPATIENT)
Dept: LAB | Facility: HOSPITAL | Age: 74
End: 2019-01-17

## 2019-01-17 DIAGNOSIS — I10 ESSENTIAL HYPERTENSION, MALIGNANT: ICD-10-CM

## 2019-01-17 DIAGNOSIS — E87.1 HYPOSMOLALITY SYNDROME: Primary | ICD-10-CM

## 2019-01-17 LAB
ALBUMIN SERPL-MCNC: 4.55 G/DL (ref 3.2–4.8)
ANION GAP SERPL CALCULATED.3IONS-SCNC: 8 MMOL/L (ref 3–11)
BACTERIA UR QL AUTO: NORMAL /HPF
BASOPHILS # BLD AUTO: 0.03 10*3/MM3 (ref 0–0.2)
BASOPHILS NFR BLD AUTO: 0.9 % (ref 0–1)
BILIRUB UR QL STRIP: NEGATIVE
BUN BLD-MCNC: 7 MG/DL (ref 9–23)
BUN/CREAT SERPL: 9.6 (ref 7–25)
CALCIUM SPEC-SCNC: 9.2 MG/DL (ref 8.7–10.4)
CHLORIDE SERPL-SCNC: 95 MMOL/L (ref 99–109)
CLARITY UR: CLEAR
CO2 SERPL-SCNC: 28 MMOL/L (ref 20–31)
COLOR UR: YELLOW
CREAT BLD-MCNC: 0.73 MG/DL (ref 0.6–1.3)
DEPRECATED RDW RBC AUTO: 50.4 FL (ref 37–54)
EOSINOPHIL # BLD AUTO: 0.04 10*3/MM3 (ref 0–0.3)
EOSINOPHIL NFR BLD AUTO: 1.2 % (ref 0–3)
ERYTHROCYTE [DISTWIDTH] IN BLOOD BY AUTOMATED COUNT: 15.4 % (ref 11.3–14.5)
GFR SERPL CREATININE-BSD FRML MDRD: 106 ML/MIN/1.73
GLUCOSE BLD-MCNC: 93 MG/DL (ref 70–100)
GLUCOSE UR STRIP-MCNC: NEGATIVE MG/DL
HCT VFR BLD AUTO: 41.1 % (ref 38.9–50.9)
HGB BLD-MCNC: 14.1 G/DL (ref 13.1–17.5)
HGB UR QL STRIP.AUTO: NEGATIVE
HYALINE CASTS UR QL AUTO: NORMAL /LPF
IMM GRANULOCYTES # BLD AUTO: 0.02 10*3/MM3 (ref 0–0.03)
IMM GRANULOCYTES NFR BLD AUTO: 0.6 % (ref 0–0.6)
KETONES UR QL STRIP: ABNORMAL
LEUKOCYTE ESTERASE UR QL STRIP.AUTO: ABNORMAL
LYMPHOCYTES # BLD AUTO: 0.82 10*3/MM3 (ref 0.6–4.8)
LYMPHOCYTES NFR BLD AUTO: 23.8 % (ref 24–44)
MCH RBC QN AUTO: 30.6 PG (ref 27–31)
MCHC RBC AUTO-ENTMCNC: 34.3 G/DL (ref 32–36)
MCV RBC AUTO: 89.2 FL (ref 80–99)
MONOCYTES # BLD AUTO: 0.48 10*3/MM3 (ref 0–1)
MONOCYTES NFR BLD AUTO: 13.9 % (ref 0–12)
NEUTROPHILS # BLD AUTO: 2.06 10*3/MM3 (ref 1.5–8.3)
NEUTROPHILS NFR BLD AUTO: 59.6 % (ref 41–71)
NITRITE UR QL STRIP: NEGATIVE
PH UR STRIP.AUTO: 7.5 [PH] (ref 5–8)
PHOSPHATE SERPL-MCNC: 4.7 MG/DL (ref 2.4–5.1)
PLATELET # BLD AUTO: 285 10*3/MM3 (ref 150–450)
PMV BLD AUTO: 9.3 FL (ref 6–12)
POTASSIUM BLD-SCNC: 4.3 MMOL/L (ref 3.5–5.5)
PROT UR QL STRIP: NEGATIVE
PTH-INTACT SERPL-MCNC: 49.4 PG/ML (ref 11–80)
RBC # BLD AUTO: 4.61 10*6/MM3 (ref 4.2–5.76)
RBC # UR: NORMAL /HPF
REF LAB TEST METHOD: NORMAL
SODIUM BLD-SCNC: 131 MMOL/L (ref 132–146)
SP GR UR STRIP: 1.01 (ref 1–1.03)
SQUAMOUS #/AREA URNS HPF: NORMAL /HPF
UROBILINOGEN UR QL STRIP: ABNORMAL
WBC NRBC COR # BLD: 3.45 10*3/MM3 (ref 3.5–10.8)
WBC UR QL AUTO: NORMAL /HPF

## 2019-01-17 PROCEDURE — 81001 URINALYSIS AUTO W/SCOPE: CPT | Performed by: INTERNAL MEDICINE

## 2019-01-17 PROCEDURE — 80069 RENAL FUNCTION PANEL: CPT | Performed by: INTERNAL MEDICINE

## 2019-01-17 PROCEDURE — 85025 COMPLETE CBC W/AUTO DIFF WBC: CPT | Performed by: INTERNAL MEDICINE

## 2019-01-17 PROCEDURE — 83970 ASSAY OF PARATHORMONE: CPT | Performed by: INTERNAL MEDICINE

## 2019-01-18 ENCOUNTER — OFFICE VISIT (OUTPATIENT)
Dept: INTERNAL MEDICINE | Facility: CLINIC | Age: 74
End: 2019-01-18

## 2019-01-18 VITALS
TEMPERATURE: 97.6 F | DIASTOLIC BLOOD PRESSURE: 68 MMHG | BODY MASS INDEX: 22.28 KG/M2 | OXYGEN SATURATION: 100 % | HEIGHT: 70 IN | HEART RATE: 61 BPM | WEIGHT: 155.6 LBS | SYSTOLIC BLOOD PRESSURE: 144 MMHG

## 2019-01-18 DIAGNOSIS — I10 ESSENTIAL HYPERTENSION: Primary | ICD-10-CM

## 2019-01-18 PROCEDURE — 99213 OFFICE O/P EST LOW 20 MIN: CPT | Performed by: NURSE PRACTITIONER

## 2019-01-18 RX ORDER — AMLODIPINE BESYLATE 10 MG/1
10 TABLET ORAL DAILY
Qty: 30 TABLET | Refills: 2 | Status: SHIPPED | OUTPATIENT
Start: 2019-01-18 | End: 2019-02-04

## 2019-01-18 RX ORDER — ATORVASTATIN CALCIUM 40 MG/1
TABLET, FILM COATED ORAL
COMMUNITY
End: 2019-02-04 | Stop reason: DRUGHIGH

## 2019-01-18 RX ORDER — ALBUTEROL SULFATE 90 UG/1
AEROSOL, METERED RESPIRATORY (INHALATION)
COMMUNITY
End: 2021-03-15

## 2019-02-04 ENCOUNTER — OFFICE VISIT (OUTPATIENT)
Dept: INTERNAL MEDICINE | Facility: CLINIC | Age: 74
End: 2019-02-04

## 2019-02-04 VITALS
WEIGHT: 156.4 LBS | OXYGEN SATURATION: 99 % | HEART RATE: 74 BPM | BODY MASS INDEX: 22.44 KG/M2 | SYSTOLIC BLOOD PRESSURE: 140 MMHG | DIASTOLIC BLOOD PRESSURE: 70 MMHG

## 2019-02-04 DIAGNOSIS — I10 ESSENTIAL HYPERTENSION: Primary | ICD-10-CM

## 2019-02-04 DIAGNOSIS — Z91.09 ENVIRONMENTAL ALLERGIES: ICD-10-CM

## 2019-02-04 DIAGNOSIS — K59.00 CONSTIPATION, UNSPECIFIED CONSTIPATION TYPE: ICD-10-CM

## 2019-02-04 DIAGNOSIS — J43.9 PULMONARY EMPHYSEMA, UNSPECIFIED EMPHYSEMA TYPE (HCC): ICD-10-CM

## 2019-02-04 DIAGNOSIS — I73.00 RAYNAUD'S PHENOMENON WITHOUT GANGRENE: ICD-10-CM

## 2019-02-04 DIAGNOSIS — E78.5 HYPERLIPIDEMIA, UNSPECIFIED HYPERLIPIDEMIA TYPE: ICD-10-CM

## 2019-02-04 PROBLEM — K59.09 OTHER CONSTIPATION: Status: ACTIVE | Noted: 2019-02-04

## 2019-02-04 PROCEDURE — 99213 OFFICE O/P EST LOW 20 MIN: CPT | Performed by: NURSE PRACTITIONER

## 2019-02-04 RX ORDER — METOPROLOL SUCCINATE 100 MG/1
100 TABLET, EXTENDED RELEASE ORAL DAILY
Qty: 30 TABLET | Refills: 2 | Status: SHIPPED | OUTPATIENT
Start: 2019-02-04 | End: 2019-06-07 | Stop reason: SDUPTHER

## 2019-02-04 RX ORDER — CLOPIDOGREL BISULFATE 75 MG/1
TABLET ORAL
Qty: 90 TABLET | Refills: 0 | Status: SHIPPED | OUTPATIENT
Start: 2019-02-04 | End: 2019-07-06 | Stop reason: SDUPTHER

## 2019-02-04 RX ORDER — LISINOPRIL 20 MG/1
20 TABLET ORAL 2 TIMES DAILY
Qty: 60 TABLET | Refills: 2 | Status: SHIPPED | OUTPATIENT
Start: 2019-02-04 | End: 2019-08-19 | Stop reason: SDUPTHER

## 2019-02-04 RX ORDER — POLYETHYLENE GLYCOL 3350 17 G/17G
17 POWDER, FOR SOLUTION ORAL DAILY
Qty: 30 PACKET | Refills: 5 | Status: SHIPPED | OUTPATIENT
Start: 2019-02-04 | End: 2020-06-02

## 2019-02-04 RX ORDER — NIFEDIPINE 60 MG/1
60 TABLET, EXTENDED RELEASE ORAL DAILY
Qty: 30 TABLET | Refills: 2 | Status: SHIPPED | OUTPATIENT
Start: 2019-02-04 | End: 2019-02-25

## 2019-02-04 RX ORDER — MONTELUKAST SODIUM 10 MG/1
10 TABLET ORAL NIGHTLY
Qty: 30 TABLET | Refills: 2 | Status: SHIPPED | OUTPATIENT
Start: 2019-02-04 | End: 2019-06-28 | Stop reason: SDUPTHER

## 2019-02-04 RX ORDER — ATORVASTATIN CALCIUM 20 MG/1
20 TABLET, FILM COATED ORAL DAILY
Qty: 30 TABLET | Refills: 2 | Status: SHIPPED | OUTPATIENT
Start: 2019-02-04 | End: 2019-06-28 | Stop reason: SDUPTHER

## 2019-02-20 ENCOUNTER — OFFICE VISIT (OUTPATIENT)
Dept: INTERNAL MEDICINE | Facility: CLINIC | Age: 74
End: 2019-02-20

## 2019-02-20 ENCOUNTER — TELEPHONE (OUTPATIENT)
Dept: RADIATION ONCOLOGY | Facility: HOSPITAL | Age: 74
End: 2019-02-20

## 2019-02-20 VITALS
SYSTOLIC BLOOD PRESSURE: 222 MMHG | BODY MASS INDEX: 22.71 KG/M2 | HEIGHT: 70 IN | DIASTOLIC BLOOD PRESSURE: 108 MMHG | HEART RATE: 57 BPM | WEIGHT: 158.6 LBS | OXYGEN SATURATION: 98 %

## 2019-02-20 DIAGNOSIS — I10 ESSENTIAL HYPERTENSION: Primary | ICD-10-CM

## 2019-02-20 PROCEDURE — 99213 OFFICE O/P EST LOW 20 MIN: CPT | Performed by: NURSE PRACTITIONER

## 2019-02-20 RX ORDER — AMLODIPINE BESYLATE 5 MG/1
5 TABLET ORAL DAILY
Qty: 30 TABLET | Refills: 2 | Status: SHIPPED | OUTPATIENT
Start: 2019-02-20 | End: 2019-08-19 | Stop reason: SDUPTHER

## 2019-02-20 RX ORDER — HYDROCHLOROTHIAZIDE 25 MG/1
25 TABLET ORAL DAILY
Qty: 30 TABLET | Refills: 2 | Status: SHIPPED | OUTPATIENT
Start: 2019-02-20 | End: 2019-05-16 | Stop reason: SDUPTHER

## 2019-02-25 ENCOUNTER — OFFICE VISIT (OUTPATIENT)
Dept: RADIATION ONCOLOGY | Facility: HOSPITAL | Age: 74
End: 2019-02-25

## 2019-02-25 ENCOUNTER — HOSPITAL ENCOUNTER (OUTPATIENT)
Dept: RADIATION ONCOLOGY | Facility: HOSPITAL | Age: 74
Setting detail: RADIATION/ONCOLOGY SERIES
Discharge: HOME | End: 2019-02-25

## 2019-02-25 VITALS
SYSTOLIC BLOOD PRESSURE: 192 MMHG | OXYGEN SATURATION: 95 % | BODY MASS INDEX: 22.57 KG/M2 | DIASTOLIC BLOOD PRESSURE: 84 MMHG | RESPIRATION RATE: 20 BRPM | HEART RATE: 70 BPM | WEIGHT: 157.3 LBS | TEMPERATURE: 97.7 F

## 2019-02-25 DIAGNOSIS — C61 PROSTATE CANCER (HCC): Primary | ICD-10-CM

## 2019-02-25 PROCEDURE — G0463 HOSPITAL OUTPT CLINIC VISIT: HCPCS

## 2019-03-05 DIAGNOSIS — C61 RECURRENT PROSTATE CANCER (HCC): Primary | ICD-10-CM

## 2019-04-02 ENCOUNTER — HOSPITAL ENCOUNTER (OUTPATIENT)
Dept: PET IMAGING | Facility: HOSPITAL | Age: 74
Discharge: HOME | End: 2019-04-02
Admitting: RADIOLOGY

## 2019-04-02 ENCOUNTER — DOCUMENTATION (OUTPATIENT)
Dept: RADIATION ONCOLOGY | Facility: HOSPITAL | Age: 74
End: 2019-04-02

## 2019-04-02 DIAGNOSIS — C61 RECURRENT PROSTATE CANCER (HCC): ICD-10-CM

## 2019-04-02 PROCEDURE — A9588 FLUCICLOVINE F-18: HCPCS | Performed by: RADIOLOGY

## 2019-04-02 PROCEDURE — 78815 PET IMAGE W/CT SKULL-THIGH: CPT

## 2019-04-02 PROCEDURE — 0 FLUCICLOVINE: Performed by: RADIOLOGY

## 2019-04-02 RX ADMIN — FLUCICLOVINE F-18 1 DOSE: 221 INJECTION, SOLUTION INTRAVENOUS at 11:46

## 2019-04-03 ENCOUNTER — TELEPHONE (OUTPATIENT)
Dept: RADIATION ONCOLOGY | Facility: HOSPITAL | Age: 74
End: 2019-04-03

## 2019-05-01 ENCOUNTER — HOSPITAL ENCOUNTER (OUTPATIENT)
Dept: RADIATION ONCOLOGY | Facility: HOSPITAL | Age: 74
Setting detail: RADIATION/ONCOLOGY SERIES
Discharge: HOME | End: 2019-05-01

## 2019-05-02 ENCOUNTER — OFFICE VISIT (OUTPATIENT)
Dept: RADIATION ONCOLOGY | Facility: HOSPITAL | Age: 74
End: 2019-05-02

## 2019-05-02 VITALS
RESPIRATION RATE: 20 BRPM | OXYGEN SATURATION: 97 % | BODY MASS INDEX: 22.81 KG/M2 | DIASTOLIC BLOOD PRESSURE: 67 MMHG | WEIGHT: 159 LBS | HEART RATE: 68 BPM | SYSTOLIC BLOOD PRESSURE: 141 MMHG | TEMPERATURE: 97.4 F

## 2019-05-02 DIAGNOSIS — C61 PROSTATE CANCER (HCC): Primary | ICD-10-CM

## 2019-05-02 PROCEDURE — 77334 RADIATION TREATMENT AID(S): CPT | Performed by: RADIOLOGY

## 2019-05-02 PROCEDURE — G0463 HOSPITAL OUTPT CLINIC VISIT: HCPCS

## 2019-05-08 PROCEDURE — 77300 RADIATION THERAPY DOSE PLAN: CPT | Performed by: RADIOLOGY

## 2019-05-08 PROCEDURE — 77338 DESIGN MLC DEVICE FOR IMRT: CPT | Performed by: RADIOLOGY

## 2019-05-08 PROCEDURE — 77301 RADIOTHERAPY DOSE PLAN IMRT: CPT | Performed by: RADIOLOGY

## 2019-05-15 ENCOUNTER — HOSPITAL ENCOUNTER (OUTPATIENT)
Dept: RADIATION ONCOLOGY | Facility: HOSPITAL | Age: 74
Discharge: HOME | End: 2019-05-15

## 2019-05-16 ENCOUNTER — HOSPITAL ENCOUNTER (OUTPATIENT)
Dept: RADIATION ONCOLOGY | Facility: HOSPITAL | Age: 74
Discharge: HOME | End: 2019-05-16

## 2019-05-16 DIAGNOSIS — I10 ESSENTIAL HYPERTENSION: ICD-10-CM

## 2019-05-16 PROCEDURE — 77385: CPT | Performed by: RADIOLOGY

## 2019-05-16 PROCEDURE — 77336 RADIATION PHYSICS CONSULT: CPT | Performed by: RADIOLOGY

## 2019-05-16 RX ORDER — HYDROCHLOROTHIAZIDE 25 MG/1
TABLET ORAL
Qty: 30 TABLET | Refills: 2 | Status: SHIPPED | OUTPATIENT
Start: 2019-05-16 | End: 2019-08-19 | Stop reason: SDUPTHER

## 2019-05-17 ENCOUNTER — HOSPITAL ENCOUNTER (OUTPATIENT)
Dept: RADIATION ONCOLOGY | Facility: HOSPITAL | Age: 74
Discharge: HOME | End: 2019-05-17

## 2019-05-17 PROCEDURE — 77385: CPT | Performed by: RADIOLOGY

## 2019-05-20 ENCOUNTER — HOSPITAL ENCOUNTER (OUTPATIENT)
Dept: RADIATION ONCOLOGY | Facility: HOSPITAL | Age: 74
Discharge: HOME | End: 2019-05-20

## 2019-05-20 VITALS — WEIGHT: 158.4 LBS | BODY MASS INDEX: 22.73 KG/M2

## 2019-05-20 PROCEDURE — 77385: CPT | Performed by: RADIOLOGY

## 2019-05-21 ENCOUNTER — HOSPITAL ENCOUNTER (OUTPATIENT)
Dept: RADIATION ONCOLOGY | Facility: HOSPITAL | Age: 74
Discharge: HOME | End: 2019-05-21

## 2019-05-21 PROCEDURE — 77385: CPT | Performed by: RADIOLOGY

## 2019-05-22 ENCOUNTER — HOSPITAL ENCOUNTER (OUTPATIENT)
Dept: RADIATION ONCOLOGY | Facility: HOSPITAL | Age: 74
Discharge: HOME | End: 2019-05-22

## 2019-05-22 PROCEDURE — 77385: CPT | Performed by: RADIOLOGY

## 2019-05-23 ENCOUNTER — HOSPITAL ENCOUNTER (OUTPATIENT)
Dept: RADIATION ONCOLOGY | Facility: HOSPITAL | Age: 74
Discharge: HOME | End: 2019-05-23

## 2019-05-23 PROCEDURE — 77385: CPT | Performed by: RADIOLOGY

## 2019-05-23 PROCEDURE — 77336 RADIATION PHYSICS CONSULT: CPT | Performed by: RADIOLOGY

## 2019-05-24 ENCOUNTER — HOSPITAL ENCOUNTER (OUTPATIENT)
Dept: RADIATION ONCOLOGY | Facility: HOSPITAL | Age: 74
Discharge: HOME | End: 2019-05-24

## 2019-05-24 PROCEDURE — 77385: CPT | Performed by: RADIOLOGY

## 2019-05-28 ENCOUNTER — HOSPITAL ENCOUNTER (OUTPATIENT)
Dept: RADIATION ONCOLOGY | Facility: HOSPITAL | Age: 74
Discharge: HOME | End: 2019-05-28

## 2019-05-28 VITALS — BODY MASS INDEX: 22.71 KG/M2 | WEIGHT: 158.3 LBS

## 2019-05-28 PROCEDURE — 77385: CPT | Performed by: RADIOLOGY

## 2019-05-29 ENCOUNTER — HOSPITAL ENCOUNTER (OUTPATIENT)
Dept: RADIATION ONCOLOGY | Facility: HOSPITAL | Age: 74
Discharge: HOME | End: 2019-05-29

## 2019-05-29 PROCEDURE — 77385: CPT | Performed by: RADIOLOGY

## 2019-05-30 ENCOUNTER — HOSPITAL ENCOUNTER (OUTPATIENT)
Dept: RADIATION ONCOLOGY | Facility: HOSPITAL | Age: 74
Discharge: HOME | End: 2019-05-30

## 2019-05-30 PROCEDURE — 77385: CPT | Performed by: RADIOLOGY

## 2019-05-31 ENCOUNTER — HOSPITAL ENCOUNTER (OUTPATIENT)
Dept: RADIATION ONCOLOGY | Facility: HOSPITAL | Age: 74
Discharge: HOME | End: 2019-05-31

## 2019-05-31 PROCEDURE — 77336 RADIATION PHYSICS CONSULT: CPT | Performed by: RADIOLOGY

## 2019-05-31 PROCEDURE — 77385: CPT | Performed by: RADIOLOGY

## 2019-06-03 ENCOUNTER — HOSPITAL ENCOUNTER (OUTPATIENT)
Dept: RADIATION ONCOLOGY | Facility: HOSPITAL | Age: 74
Setting detail: RADIATION/ONCOLOGY SERIES
Discharge: HOME | End: 2019-06-03

## 2019-06-03 ENCOUNTER — HOSPITAL ENCOUNTER (OUTPATIENT)
Dept: RADIATION ONCOLOGY | Facility: HOSPITAL | Age: 74
Discharge: HOME | End: 2019-06-03

## 2019-06-03 PROCEDURE — 77385: CPT | Performed by: RADIOLOGY

## 2019-06-04 ENCOUNTER — HOSPITAL ENCOUNTER (OUTPATIENT)
Dept: RADIATION ONCOLOGY | Facility: HOSPITAL | Age: 74
Discharge: HOME | End: 2019-06-04

## 2019-06-04 VITALS — WEIGHT: 157.8 LBS | BODY MASS INDEX: 22.64 KG/M2

## 2019-06-04 PROCEDURE — 77385: CPT | Performed by: RADIOLOGY

## 2019-06-05 ENCOUNTER — HOSPITAL ENCOUNTER (OUTPATIENT)
Dept: RADIATION ONCOLOGY | Facility: HOSPITAL | Age: 74
Discharge: HOME | End: 2019-06-05

## 2019-06-05 PROCEDURE — 77385: CPT | Performed by: RADIOLOGY

## 2019-06-06 ENCOUNTER — HOSPITAL ENCOUNTER (OUTPATIENT)
Dept: RADIATION ONCOLOGY | Facility: HOSPITAL | Age: 74
Discharge: HOME | End: 2019-06-06

## 2019-06-06 PROCEDURE — 77385: CPT | Performed by: RADIOLOGY

## 2019-06-07 ENCOUNTER — HOSPITAL ENCOUNTER (OUTPATIENT)
Dept: RADIATION ONCOLOGY | Facility: HOSPITAL | Age: 74
Discharge: HOME | End: 2019-06-07

## 2019-06-07 DIAGNOSIS — I10 ESSENTIAL HYPERTENSION: ICD-10-CM

## 2019-06-07 PROCEDURE — 77385: CPT | Performed by: RADIOLOGY

## 2019-06-07 PROCEDURE — 77336 RADIATION PHYSICS CONSULT: CPT | Performed by: RADIOLOGY

## 2019-06-07 RX ORDER — METOPROLOL SUCCINATE 100 MG/1
TABLET, EXTENDED RELEASE ORAL
Qty: 90 TABLET | Refills: 0 | Status: SHIPPED | OUTPATIENT
Start: 2019-06-07 | End: 2019-10-03 | Stop reason: SDUPTHER

## 2019-06-10 ENCOUNTER — HOSPITAL ENCOUNTER (OUTPATIENT)
Dept: RADIATION ONCOLOGY | Facility: HOSPITAL | Age: 74
Discharge: HOME | End: 2019-06-10

## 2019-06-10 PROCEDURE — 77385: CPT | Performed by: RADIOLOGY

## 2019-06-11 ENCOUNTER — HOSPITAL ENCOUNTER (OUTPATIENT)
Dept: RADIATION ONCOLOGY | Facility: HOSPITAL | Age: 74
Discharge: HOME | End: 2019-06-11

## 2019-06-11 VITALS — WEIGHT: 157.7 LBS | BODY MASS INDEX: 22.63 KG/M2

## 2019-06-11 PROCEDURE — 77385: CPT | Performed by: RADIOLOGY

## 2019-06-12 ENCOUNTER — HOSPITAL ENCOUNTER (OUTPATIENT)
Dept: RADIATION ONCOLOGY | Facility: HOSPITAL | Age: 74
Discharge: HOME | End: 2019-06-12

## 2019-06-12 PROCEDURE — 77385: CPT | Performed by: RADIOLOGY

## 2019-06-13 ENCOUNTER — HOSPITAL ENCOUNTER (OUTPATIENT)
Dept: RADIATION ONCOLOGY | Facility: HOSPITAL | Age: 74
Discharge: HOME | End: 2019-06-13

## 2019-06-13 PROCEDURE — 77385: CPT | Performed by: RADIOLOGY

## 2019-06-14 ENCOUNTER — HOSPITAL ENCOUNTER (OUTPATIENT)
Dept: RADIATION ONCOLOGY | Facility: HOSPITAL | Age: 74
Discharge: HOME | End: 2019-06-14

## 2019-06-14 PROCEDURE — 77385: CPT | Performed by: RADIOLOGY

## 2019-06-14 PROCEDURE — 77336 RADIATION PHYSICS CONSULT: CPT | Performed by: RADIOLOGY

## 2019-06-17 ENCOUNTER — HOSPITAL ENCOUNTER (OUTPATIENT)
Dept: RADIATION ONCOLOGY | Facility: HOSPITAL | Age: 74
Discharge: HOME | End: 2019-06-17

## 2019-06-17 DIAGNOSIS — I10 ESSENTIAL HYPERTENSION: ICD-10-CM

## 2019-06-17 PROCEDURE — 77385: CPT | Performed by: RADIOLOGY

## 2019-06-17 RX ORDER — AMLODIPINE BESYLATE 5 MG/1
TABLET ORAL
Qty: 30 TABLET | Refills: 1 | Status: SHIPPED | OUTPATIENT
Start: 2019-06-17 | End: 2019-10-03 | Stop reason: SDUPTHER

## 2019-06-18 ENCOUNTER — HOSPITAL ENCOUNTER (OUTPATIENT)
Dept: RADIATION ONCOLOGY | Facility: HOSPITAL | Age: 74
Discharge: HOME | End: 2019-06-18

## 2019-06-18 VITALS — WEIGHT: 157.7 LBS | BODY MASS INDEX: 22.63 KG/M2

## 2019-06-18 PROCEDURE — 77385: CPT | Performed by: RADIOLOGY

## 2019-06-19 ENCOUNTER — HOSPITAL ENCOUNTER (OUTPATIENT)
Dept: RADIATION ONCOLOGY | Facility: HOSPITAL | Age: 74
Discharge: HOME | End: 2019-06-19

## 2019-06-19 PROCEDURE — 77385: CPT | Performed by: RADIOLOGY

## 2019-06-20 ENCOUNTER — HOSPITAL ENCOUNTER (OUTPATIENT)
Dept: RADIATION ONCOLOGY | Facility: HOSPITAL | Age: 74
Discharge: HOME | End: 2019-06-20

## 2019-06-21 ENCOUNTER — HOSPITAL ENCOUNTER (OUTPATIENT)
Dept: RADIATION ONCOLOGY | Facility: HOSPITAL | Age: 74
Discharge: HOME | End: 2019-06-21

## 2019-06-21 PROCEDURE — 77385: CPT | Performed by: RADIOLOGY

## 2019-06-24 ENCOUNTER — HOSPITAL ENCOUNTER (OUTPATIENT)
Dept: RADIATION ONCOLOGY | Facility: HOSPITAL | Age: 74
Discharge: HOME | End: 2019-06-24

## 2019-06-24 PROCEDURE — 77385: CPT | Performed by: RADIOLOGY

## 2019-06-25 ENCOUNTER — HOSPITAL ENCOUNTER (OUTPATIENT)
Dept: RADIATION ONCOLOGY | Facility: HOSPITAL | Age: 74
Discharge: HOME | End: 2019-06-25

## 2019-06-25 VITALS — WEIGHT: 157.8 LBS | BODY MASS INDEX: 22.64 KG/M2

## 2019-06-25 PROCEDURE — 77385: CPT | Performed by: RADIOLOGY

## 2019-06-26 ENCOUNTER — HOSPITAL ENCOUNTER (OUTPATIENT)
Dept: RADIATION ONCOLOGY | Facility: HOSPITAL | Age: 74
Discharge: HOME | End: 2019-06-26

## 2019-06-26 PROCEDURE — 77385: CPT | Performed by: RADIOLOGY

## 2019-06-27 ENCOUNTER — HOSPITAL ENCOUNTER (OUTPATIENT)
Dept: RADIATION ONCOLOGY | Facility: HOSPITAL | Age: 74
Discharge: HOME | End: 2019-06-27

## 2019-06-27 PROCEDURE — 77385: CPT | Performed by: RADIOLOGY

## 2019-06-28 DIAGNOSIS — E78.5 HYPERLIPIDEMIA, UNSPECIFIED HYPERLIPIDEMIA TYPE: ICD-10-CM

## 2019-06-28 DIAGNOSIS — Z91.09 ENVIRONMENTAL ALLERGIES: ICD-10-CM

## 2019-06-28 DIAGNOSIS — J43.9 PULMONARY EMPHYSEMA, UNSPECIFIED EMPHYSEMA TYPE (HCC): ICD-10-CM

## 2019-06-28 PROCEDURE — 77336 RADIATION PHYSICS CONSULT: CPT | Performed by: RADIOLOGY

## 2019-06-28 RX ORDER — ATORVASTATIN CALCIUM 20 MG/1
TABLET, FILM COATED ORAL
Qty: 90 TABLET | Refills: 0 | Status: SHIPPED | OUTPATIENT
Start: 2019-06-28 | End: 2019-10-03 | Stop reason: SDUPTHER

## 2019-06-28 RX ORDER — MONTELUKAST SODIUM 10 MG/1
TABLET ORAL
Qty: 90 TABLET | Refills: 0 | Status: SHIPPED | OUTPATIENT
Start: 2019-06-28 | End: 2019-10-03 | Stop reason: SDUPTHER

## 2019-07-01 ENCOUNTER — HOSPITAL ENCOUNTER (OUTPATIENT)
Dept: RADIATION ONCOLOGY | Facility: HOSPITAL | Age: 74
Setting detail: RADIATION/ONCOLOGY SERIES
Discharge: HOME | End: 2019-07-01

## 2019-07-01 ENCOUNTER — HOSPITAL ENCOUNTER (OUTPATIENT)
Dept: RADIATION ONCOLOGY | Facility: HOSPITAL | Age: 74
Discharge: HOME | End: 2019-07-01

## 2019-07-01 PROCEDURE — 77385: CPT | Performed by: RADIOLOGY

## 2019-07-02 ENCOUNTER — HOSPITAL ENCOUNTER (OUTPATIENT)
Dept: RADIATION ONCOLOGY | Facility: HOSPITAL | Age: 74
Discharge: HOME | End: 2019-07-02

## 2019-07-02 VITALS — BODY MASS INDEX: 22.8 KG/M2 | WEIGHT: 158.9 LBS

## 2019-07-02 PROCEDURE — 77385: CPT | Performed by: RADIOLOGY

## 2019-07-03 ENCOUNTER — HOSPITAL ENCOUNTER (OUTPATIENT)
Dept: RADIATION ONCOLOGY | Facility: HOSPITAL | Age: 74
Discharge: HOME | End: 2019-07-03

## 2019-07-03 PROCEDURE — 77385: CPT | Performed by: RADIOLOGY

## 2019-07-05 ENCOUNTER — HOSPITAL ENCOUNTER (OUTPATIENT)
Dept: RADIATION ONCOLOGY | Facility: HOSPITAL | Age: 74
Discharge: HOME | End: 2019-07-05

## 2019-07-05 PROCEDURE — 77336 RADIATION PHYSICS CONSULT: CPT | Performed by: RADIOLOGY

## 2019-07-05 PROCEDURE — 77385: CPT | Performed by: RADIOLOGY

## 2019-07-06 DIAGNOSIS — E78.5 HYPERLIPIDEMIA, UNSPECIFIED HYPERLIPIDEMIA TYPE: ICD-10-CM

## 2019-07-06 RX ORDER — CLOPIDOGREL BISULFATE 75 MG/1
TABLET ORAL
Qty: 90 TABLET | Refills: 0 | Status: SHIPPED | OUTPATIENT
Start: 2019-07-06 | End: 2020-01-21 | Stop reason: SDUPTHER

## 2019-07-08 ENCOUNTER — HOSPITAL ENCOUNTER (OUTPATIENT)
Dept: RADIATION ONCOLOGY | Facility: HOSPITAL | Age: 74
Discharge: HOME | End: 2019-07-08

## 2019-07-08 PROCEDURE — 77385: CPT | Performed by: RADIOLOGY

## 2019-07-09 ENCOUNTER — HOSPITAL ENCOUNTER (OUTPATIENT)
Dept: RADIATION ONCOLOGY | Facility: HOSPITAL | Age: 74
Discharge: HOME | End: 2019-07-09

## 2019-07-09 VITALS — WEIGHT: 156.8 LBS | BODY MASS INDEX: 22.5 KG/M2

## 2019-07-09 PROCEDURE — 77385: CPT | Performed by: RADIOLOGY

## 2019-07-19 ENCOUNTER — TELEPHONE (OUTPATIENT)
Dept: RADIATION ONCOLOGY | Facility: HOSPITAL | Age: 74
End: 2019-07-19

## 2019-08-07 ENCOUNTER — HOSPITAL ENCOUNTER (OUTPATIENT)
Dept: RADIATION ONCOLOGY | Facility: HOSPITAL | Age: 74
Setting detail: RADIATION/ONCOLOGY SERIES
Discharge: HOME | End: 2019-08-07

## 2019-08-07 ENCOUNTER — OFFICE VISIT (OUTPATIENT)
Dept: RADIATION ONCOLOGY | Facility: HOSPITAL | Age: 74
End: 2019-08-07

## 2019-08-07 VITALS
BODY MASS INDEX: 22.38 KG/M2 | WEIGHT: 156 LBS | SYSTOLIC BLOOD PRESSURE: 181 MMHG | DIASTOLIC BLOOD PRESSURE: 84 MMHG | OXYGEN SATURATION: 95 % | HEART RATE: 66 BPM | TEMPERATURE: 97.3 F | RESPIRATION RATE: 20 BRPM

## 2019-08-07 DIAGNOSIS — C61 PROSTATE CANCER (HCC): Primary | ICD-10-CM

## 2019-08-07 PROCEDURE — G0463 HOSPITAL OUTPT CLINIC VISIT: HCPCS

## 2019-08-19 DIAGNOSIS — I10 ESSENTIAL HYPERTENSION: ICD-10-CM

## 2019-08-19 RX ORDER — HYDROCHLOROTHIAZIDE 25 MG/1
25 TABLET ORAL DAILY
Qty: 30 TABLET | Refills: 0 | Status: SHIPPED | OUTPATIENT
Start: 2019-08-19 | End: 2019-09-17 | Stop reason: SDUPTHER

## 2019-08-19 RX ORDER — AMLODIPINE BESYLATE 5 MG/1
5 TABLET ORAL DAILY
Qty: 30 TABLET | Refills: 0 | Status: SHIPPED | OUTPATIENT
Start: 2019-08-19 | End: 2019-08-19 | Stop reason: SDUPTHER

## 2019-08-19 RX ORDER — LISINOPRIL 20 MG/1
TABLET ORAL
Qty: 180 TABLET | Refills: 0 | Status: CANCELLED | OUTPATIENT
Start: 2019-08-19

## 2019-08-19 RX ORDER — HYDROCHLOROTHIAZIDE 25 MG/1
TABLET ORAL
Qty: 30 TABLET | Refills: 2 | Status: CANCELLED | OUTPATIENT
Start: 2019-08-19

## 2019-08-19 RX ORDER — AMLODIPINE BESYLATE 5 MG/1
TABLET ORAL
Qty: 30 TABLET | Refills: 1 | OUTPATIENT
Start: 2019-08-19

## 2019-08-19 RX ORDER — LISINOPRIL 20 MG/1
20 TABLET ORAL 2 TIMES DAILY
Qty: 60 TABLET | Refills: 0 | Status: SHIPPED | OUTPATIENT
Start: 2019-08-19 | End: 2019-09-17 | Stop reason: SDUPTHER

## 2019-09-17 DIAGNOSIS — I10 ESSENTIAL HYPERTENSION: ICD-10-CM

## 2019-09-17 RX ORDER — LISINOPRIL 20 MG/1
TABLET ORAL
Qty: 60 TABLET | Refills: 0 | Status: SHIPPED | OUTPATIENT
Start: 2019-09-17 | End: 2019-10-03 | Stop reason: SDUPTHER

## 2019-09-17 RX ORDER — HYDROCHLOROTHIAZIDE 25 MG/1
TABLET ORAL
Qty: 30 TABLET | Refills: 0 | Status: SHIPPED | OUTPATIENT
Start: 2019-09-17 | End: 2019-10-03 | Stop reason: SDUPTHER

## 2019-09-17 RX ORDER — AMLODIPINE BESYLATE 5 MG/1
TABLET ORAL
Qty: 30 TABLET | Refills: 0 | Status: SHIPPED | OUTPATIENT
Start: 2019-09-17 | End: 2019-10-03 | Stop reason: SDUPTHER

## 2019-09-27 DIAGNOSIS — I10 ESSENTIAL HYPERTENSION: ICD-10-CM

## 2019-09-27 RX ORDER — METOPROLOL SUCCINATE 100 MG/1
TABLET, EXTENDED RELEASE ORAL
Qty: 90 TABLET | Refills: 0 | OUTPATIENT
Start: 2019-09-27

## 2019-10-03 ENCOUNTER — OFFICE VISIT (OUTPATIENT)
Dept: INTERNAL MEDICINE | Facility: CLINIC | Age: 74
End: 2019-10-03

## 2019-10-03 VITALS
HEIGHT: 70 IN | TEMPERATURE: 98.1 F | DIASTOLIC BLOOD PRESSURE: 80 MMHG | BODY MASS INDEX: 22.33 KG/M2 | SYSTOLIC BLOOD PRESSURE: 122 MMHG | RESPIRATION RATE: 18 BRPM | OXYGEN SATURATION: 99 % | WEIGHT: 156 LBS | HEART RATE: 93 BPM

## 2019-10-03 DIAGNOSIS — I10 ESSENTIAL HYPERTENSION: ICD-10-CM

## 2019-10-03 DIAGNOSIS — E78.5 HYPERLIPIDEMIA, UNSPECIFIED HYPERLIPIDEMIA TYPE: ICD-10-CM

## 2019-10-03 DIAGNOSIS — Z91.09 ENVIRONMENTAL ALLERGIES: ICD-10-CM

## 2019-10-03 DIAGNOSIS — J43.9 PULMONARY EMPHYSEMA, UNSPECIFIED EMPHYSEMA TYPE (HCC): ICD-10-CM

## 2019-10-03 PROCEDURE — 99214 OFFICE O/P EST MOD 30 MIN: CPT | Performed by: NURSE PRACTITIONER

## 2019-10-03 RX ORDER — AMLODIPINE BESYLATE 5 MG/1
5 TABLET ORAL DAILY
Qty: 90 TABLET | Refills: 1 | Status: SHIPPED | OUTPATIENT
Start: 2019-10-03 | End: 2020-01-10

## 2019-10-03 RX ORDER — DIPHENHYDRAMINE HCL 25 MG
25 CAPSULE ORAL EVERY 6 HOURS PRN
COMMUNITY
End: 2020-01-10 | Stop reason: ALTCHOICE

## 2019-10-03 RX ORDER — MONTELUKAST SODIUM 10 MG/1
10 TABLET ORAL NIGHTLY
Qty: 90 TABLET | Refills: 1 | Status: SHIPPED | OUTPATIENT
Start: 2019-10-03 | End: 2021-03-15

## 2019-10-03 RX ORDER — LISINOPRIL 20 MG/1
20 TABLET ORAL 2 TIMES DAILY
Qty: 180 TABLET | Refills: 1 | Status: SHIPPED | OUTPATIENT
Start: 2019-10-03 | End: 2020-01-10

## 2019-10-03 RX ORDER — METOPROLOL SUCCINATE 100 MG/1
100 TABLET, EXTENDED RELEASE ORAL DAILY
Qty: 90 TABLET | Refills: 1 | Status: SHIPPED | OUTPATIENT
Start: 2019-10-03 | End: 2020-04-03

## 2019-10-03 RX ORDER — ATORVASTATIN CALCIUM 20 MG/1
20 TABLET, FILM COATED ORAL DAILY
Qty: 90 TABLET | Refills: 1 | Status: SHIPPED | OUTPATIENT
Start: 2019-10-03 | End: 2020-06-02 | Stop reason: SDUPTHER

## 2019-10-03 RX ORDER — HYDROCHLOROTHIAZIDE 25 MG/1
25 TABLET ORAL DAILY
Qty: 90 TABLET | Refills: 1 | Status: SHIPPED | OUTPATIENT
Start: 2019-10-03 | End: 2020-04-24

## 2019-10-10 LAB
ALBUMIN SERPL-MCNC: 5 G/DL (ref 3.5–5.2)
ALBUMIN/GLOB SERPL: 2.4 G/DL
ALP SERPL-CCNC: 53 U/L (ref 39–117)
ALT SERPL W P-5'-P-CCNC: 18 U/L (ref 1–41)
ANION GAP SERPL CALCULATED.3IONS-SCNC: 12.2 MMOL/L (ref 5–15)
AST SERPL-CCNC: 22 U/L (ref 1–40)
BILIRUB SERPL-MCNC: 0.4 MG/DL (ref 0.2–1.2)
BUN BLD-MCNC: 9 MG/DL (ref 8–23)
BUN/CREAT SERPL: 12.9 (ref 7–25)
CALCIUM SPEC-SCNC: 9.3 MG/DL (ref 8.6–10.5)
CHLORIDE SERPL-SCNC: 88 MMOL/L (ref 98–107)
CHOLEST SERPL-MCNC: 170 MG/DL (ref 0–200)
CO2 SERPL-SCNC: 28.8 MMOL/L (ref 22–29)
CREAT BLD-MCNC: 0.7 MG/DL (ref 0.76–1.27)
GFR SERPL CREATININE-BSD FRML MDRD: 111 ML/MIN/1.73
GLOBULIN UR ELPH-MCNC: 2.1 GM/DL
GLUCOSE BLD-MCNC: 82 MG/DL (ref 65–99)
HDLC SERPL-MCNC: 95 MG/DL (ref 40–60)
LDLC SERPL CALC-MCNC: 65 MG/DL (ref 0–100)
LDLC/HDLC SERPL: 0.69 {RATIO}
POTASSIUM BLD-SCNC: 4.2 MMOL/L (ref 3.5–5.2)
PROT SERPL-MCNC: 7.1 G/DL (ref 6–8.5)
SODIUM BLD-SCNC: 129 MMOL/L (ref 136–145)
TRIGL SERPL-MCNC: 49 MG/DL (ref 0–150)
VLDLC SERPL-MCNC: 9.8 MG/DL (ref 5–40)

## 2019-10-10 PROCEDURE — 80061 LIPID PANEL: CPT | Performed by: NURSE PRACTITIONER

## 2019-10-10 PROCEDURE — 80053 COMPREHEN METABOLIC PANEL: CPT | Performed by: NURSE PRACTITIONER

## 2019-10-11 DIAGNOSIS — I10 ESSENTIAL HYPERTENSION: Primary | ICD-10-CM

## 2019-10-29 ENCOUNTER — LAB (OUTPATIENT)
Dept: INTERNAL MEDICINE | Facility: CLINIC | Age: 74
End: 2019-10-29

## 2019-10-29 DIAGNOSIS — I10 ESSENTIAL HYPERTENSION: ICD-10-CM

## 2019-10-29 LAB
ALBUMIN SERPL-MCNC: 5.1 G/DL (ref 3.5–5.2)
ALBUMIN/GLOB SERPL: 2.1 G/DL
ALP SERPL-CCNC: 55 U/L (ref 39–117)
ALT SERPL W P-5'-P-CCNC: 22 U/L (ref 1–41)
ANION GAP SERPL CALCULATED.3IONS-SCNC: 14.8 MMOL/L (ref 5–15)
AST SERPL-CCNC: 22 U/L (ref 1–40)
BILIRUB SERPL-MCNC: 0.4 MG/DL (ref 0.2–1.2)
BUN BLD-MCNC: 8 MG/DL (ref 8–23)
BUN/CREAT SERPL: 11.3 (ref 7–25)
CALCIUM SPEC-SCNC: 9.8 MG/DL (ref 8.6–10.5)
CHLORIDE SERPL-SCNC: 88 MMOL/L (ref 98–107)
CO2 SERPL-SCNC: 29.2 MMOL/L (ref 22–29)
CREAT BLD-MCNC: 0.71 MG/DL (ref 0.76–1.27)
GFR SERPL CREATININE-BSD FRML MDRD: 109 ML/MIN/1.73
GLOBULIN UR ELPH-MCNC: 2.4 GM/DL
GLUCOSE BLD-MCNC: 91 MG/DL (ref 65–99)
POTASSIUM BLD-SCNC: 3.7 MMOL/L (ref 3.5–5.2)
PROT SERPL-MCNC: 7.5 G/DL (ref 6–8.5)
SODIUM BLD-SCNC: 132 MMOL/L (ref 136–145)

## 2019-10-29 PROCEDURE — 80053 COMPREHEN METABOLIC PANEL: CPT | Performed by: NURSE PRACTITIONER

## 2019-10-30 ENCOUNTER — TELEPHONE (OUTPATIENT)
Dept: INTERNAL MEDICINE | Facility: CLINIC | Age: 74
End: 2019-10-30

## 2020-01-10 ENCOUNTER — OFFICE VISIT (OUTPATIENT)
Dept: INTERNAL MEDICINE | Facility: CLINIC | Age: 75
End: 2020-01-10

## 2020-01-10 VITALS
SYSTOLIC BLOOD PRESSURE: 148 MMHG | DIASTOLIC BLOOD PRESSURE: 82 MMHG | BODY MASS INDEX: 23.05 KG/M2 | RESPIRATION RATE: 18 BRPM | HEIGHT: 70 IN | HEART RATE: 63 BPM | OXYGEN SATURATION: 99 % | WEIGHT: 161 LBS

## 2020-01-10 DIAGNOSIS — E87.1 HYPONATREMIA: ICD-10-CM

## 2020-01-10 DIAGNOSIS — L30.9 DERMATITIS: ICD-10-CM

## 2020-01-10 DIAGNOSIS — J44.9 CHRONIC OBSTRUCTIVE PULMONARY DISEASE, UNSPECIFIED COPD TYPE (HCC): Primary | ICD-10-CM

## 2020-01-10 DIAGNOSIS — I10 ESSENTIAL HYPERTENSION: ICD-10-CM

## 2020-01-10 DIAGNOSIS — H61.23 BILATERAL IMPACTED CERUMEN: ICD-10-CM

## 2020-01-10 PROCEDURE — 69209 REMOVE IMPACTED EAR WAX UNI: CPT | Performed by: NURSE PRACTITIONER

## 2020-01-10 PROCEDURE — 99214 OFFICE O/P EST MOD 30 MIN: CPT | Performed by: NURSE PRACTITIONER

## 2020-01-10 RX ORDER — AMLODIPINE BESYLATE 10 MG/1
10 TABLET ORAL DAILY
Qty: 90 TABLET | Refills: 1 | Status: SHIPPED | OUTPATIENT
Start: 2020-01-10 | End: 2020-04-09

## 2020-01-10 RX ORDER — CETIRIZINE HYDROCHLORIDE 10 MG/1
10 TABLET ORAL DAILY
COMMUNITY
End: 2021-03-15 | Stop reason: ALTCHOICE

## 2020-01-15 ENCOUNTER — APPOINTMENT (OUTPATIENT)
Dept: LAB | Facility: HOSPITAL | Age: 75
End: 2020-01-15

## 2020-01-15 LAB
ALBUMIN SERPL-MCNC: 4.8 G/DL (ref 3.5–5.2)
ALBUMIN UR-MCNC: <1.2 MG/DL
ALBUMIN/GLOB SERPL: 1.8 G/DL
ALP SERPL-CCNC: 51 U/L (ref 39–117)
ALT SERPL W P-5'-P-CCNC: 18 U/L (ref 1–41)
ANION GAP SERPL CALCULATED.3IONS-SCNC: 13.9 MMOL/L (ref 5–15)
AST SERPL-CCNC: 13 U/L (ref 1–40)
BILIRUB SERPL-MCNC: 0.7 MG/DL (ref 0.2–1.2)
BUN BLD-MCNC: 11 MG/DL (ref 8–23)
BUN/CREAT SERPL: 14.5 (ref 7–25)
CALCIUM SPEC-SCNC: 9.9 MG/DL (ref 8.6–10.5)
CHLORIDE SERPL-SCNC: 88 MMOL/L (ref 98–107)
CO2 SERPL-SCNC: 28.1 MMOL/L (ref 22–29)
CREAT BLD-MCNC: 0.76 MG/DL (ref 0.76–1.27)
GFR SERPL CREATININE-BSD FRML MDRD: 101 ML/MIN/1.73
GLOBULIN UR ELPH-MCNC: 2.6 GM/DL
GLUCOSE BLD-MCNC: 112 MG/DL (ref 65–99)
POTASSIUM BLD-SCNC: 3.5 MMOL/L (ref 3.5–5.2)
PROT SERPL-MCNC: 7.4 G/DL (ref 6–8.5)
SODIUM BLD-SCNC: 130 MMOL/L (ref 136–145)

## 2020-01-15 PROCEDURE — 80053 COMPREHEN METABOLIC PANEL: CPT | Performed by: NURSE PRACTITIONER

## 2020-01-15 PROCEDURE — 82043 UR ALBUMIN QUANTITATIVE: CPT | Performed by: NURSE PRACTITIONER

## 2020-01-17 ENCOUNTER — HOSPITAL ENCOUNTER (OUTPATIENT)
Dept: RADIATION ONCOLOGY | Facility: HOSPITAL | Age: 75
Setting detail: RADIATION/ONCOLOGY SERIES
Discharge: HOME | End: 2020-01-17

## 2020-01-17 ENCOUNTER — OFFICE VISIT (OUTPATIENT)
Dept: RADIATION ONCOLOGY | Facility: HOSPITAL | Age: 75
End: 2020-01-17

## 2020-01-17 VITALS
SYSTOLIC BLOOD PRESSURE: 192 MMHG | HEIGHT: 70 IN | RESPIRATION RATE: 16 BRPM | HEART RATE: 63 BPM | BODY MASS INDEX: 23.37 KG/M2 | OXYGEN SATURATION: 98 % | WEIGHT: 163.2 LBS | TEMPERATURE: 97.2 F | DIASTOLIC BLOOD PRESSURE: 96 MMHG

## 2020-01-17 DIAGNOSIS — C61 PROSTATE CANCER (HCC): Primary | ICD-10-CM

## 2020-01-17 PROCEDURE — G0463 HOSPITAL OUTPT CLINIC VISIT: HCPCS

## 2020-01-21 DIAGNOSIS — E78.5 HYPERLIPIDEMIA, UNSPECIFIED HYPERLIPIDEMIA TYPE: ICD-10-CM

## 2020-01-21 RX ORDER — CLOPIDOGREL BISULFATE 75 MG/1
TABLET ORAL
Qty: 90 TABLET | Refills: 0 | OUTPATIENT
Start: 2020-01-21

## 2020-01-21 RX ORDER — CLOPIDOGREL BISULFATE 75 MG/1
TABLET ORAL
Qty: 90 TABLET | Refills: 1 | Status: SHIPPED | OUTPATIENT
Start: 2020-01-21 | End: 2020-06-02 | Stop reason: SDUPTHER

## 2020-04-03 DIAGNOSIS — I10 ESSENTIAL HYPERTENSION: ICD-10-CM

## 2020-04-03 RX ORDER — METOPROLOL SUCCINATE 100 MG/1
TABLET, EXTENDED RELEASE ORAL
Qty: 90 TABLET | Refills: 0 | Status: SHIPPED | OUTPATIENT
Start: 2020-04-03 | End: 2020-06-02 | Stop reason: SDUPTHER

## 2020-04-24 DIAGNOSIS — I10 ESSENTIAL HYPERTENSION: ICD-10-CM

## 2020-04-24 RX ORDER — HYDROCHLOROTHIAZIDE 25 MG/1
TABLET ORAL
Qty: 90 TABLET | Refills: 0 | Status: SHIPPED | OUTPATIENT
Start: 2020-04-24 | End: 2020-06-02 | Stop reason: SDUPTHER

## 2020-06-02 ENCOUNTER — OFFICE VISIT (OUTPATIENT)
Dept: INTERNAL MEDICINE | Facility: CLINIC | Age: 75
End: 2020-06-02

## 2020-06-02 VITALS
HEART RATE: 70 BPM | SYSTOLIC BLOOD PRESSURE: 168 MMHG | BODY MASS INDEX: 23.39 KG/M2 | OXYGEN SATURATION: 98 % | WEIGHT: 163 LBS | DIASTOLIC BLOOD PRESSURE: 92 MMHG

## 2020-06-02 DIAGNOSIS — M25.542 ARTHRALGIA OF BOTH HANDS: ICD-10-CM

## 2020-06-02 DIAGNOSIS — I73.9 PERIPHERAL VASCULAR DISEASE (HCC): ICD-10-CM

## 2020-06-02 DIAGNOSIS — E78.5 HYPERLIPIDEMIA, UNSPECIFIED HYPERLIPIDEMIA TYPE: ICD-10-CM

## 2020-06-02 DIAGNOSIS — I10 ESSENTIAL HYPERTENSION: Primary | ICD-10-CM

## 2020-06-02 DIAGNOSIS — M25.541 ARTHRALGIA OF BOTH HANDS: ICD-10-CM

## 2020-06-02 PROCEDURE — 99213 OFFICE O/P EST LOW 20 MIN: CPT | Performed by: NURSE PRACTITIONER

## 2020-06-02 RX ORDER — AMLODIPINE BESYLATE 10 MG/1
TABLET ORAL
COMMUNITY
Start: 2020-04-06 | End: 2020-06-02 | Stop reason: SDUPTHER

## 2020-06-02 RX ORDER — AMLODIPINE BESYLATE 10 MG/1
10 TABLET ORAL DAILY
Qty: 90 TABLET | Refills: 1 | Status: SHIPPED | OUTPATIENT
Start: 2020-06-02 | End: 2021-01-05 | Stop reason: SDUPTHER

## 2020-06-02 RX ORDER — DICLOFENAC SODIUM 75 MG/1
75 TABLET, DELAYED RELEASE ORAL 2 TIMES DAILY
Qty: 60 TABLET | Refills: 5 | Status: SHIPPED | OUTPATIENT
Start: 2020-06-02 | End: 2020-11-25

## 2020-06-02 RX ORDER — METOPROLOL SUCCINATE 100 MG/1
100 TABLET, EXTENDED RELEASE ORAL DAILY
Qty: 90 TABLET | Refills: 1 | Status: SHIPPED | OUTPATIENT
Start: 2020-06-02 | End: 2021-01-05 | Stop reason: SDUPTHER

## 2020-06-02 RX ORDER — CLOPIDOGREL BISULFATE 75 MG/1
TABLET ORAL
Qty: 90 TABLET | Refills: 1 | Status: SHIPPED | OUTPATIENT
Start: 2020-06-02 | End: 2021-01-05 | Stop reason: SDUPTHER

## 2020-06-02 RX ORDER — ATORVASTATIN CALCIUM 20 MG/1
20 TABLET, FILM COATED ORAL DAILY
Qty: 90 TABLET | Refills: 1 | Status: SHIPPED | OUTPATIENT
Start: 2020-06-02 | End: 2020-11-23

## 2020-06-02 RX ORDER — HYDROCHLOROTHIAZIDE 25 MG/1
25 TABLET ORAL DAILY
Qty: 90 TABLET | Refills: 1 | Status: SHIPPED | OUTPATIENT
Start: 2020-06-02 | End: 2021-01-05 | Stop reason: SDUPTHER

## 2020-07-16 ENCOUNTER — OFFICE VISIT (OUTPATIENT)
Dept: CARDIAC SURGERY | Facility: CLINIC | Age: 75
End: 2020-07-16

## 2020-07-16 VITALS
TEMPERATURE: 97.8 F | HEIGHT: 70 IN | OXYGEN SATURATION: 99 % | WEIGHT: 160.4 LBS | HEART RATE: 62 BPM | DIASTOLIC BLOOD PRESSURE: 62 MMHG | BODY MASS INDEX: 22.96 KG/M2 | SYSTOLIC BLOOD PRESSURE: 128 MMHG

## 2020-07-16 DIAGNOSIS — I73.9 PERIPHERAL VASCULAR DISEASE (HCC): Primary | ICD-10-CM

## 2020-07-16 DIAGNOSIS — I73.9 FEMORO-POPLITEAL ARTERY DISEASE (HCC): ICD-10-CM

## 2020-07-16 PROCEDURE — 99214 OFFICE O/P EST MOD 30 MIN: CPT | Performed by: THORACIC SURGERY (CARDIOTHORACIC VASCULAR SURGERY)

## 2020-07-16 RX ORDER — FLUTICASONE PROPIONATE 50 MCG
2 SPRAY, SUSPENSION (ML) NASAL DAILY
COMMUNITY
End: 2022-08-19 | Stop reason: SDUPTHER

## 2020-07-17 DIAGNOSIS — I73.9 PERIPHERAL VASCULAR DISEASE (HCC): Primary | ICD-10-CM

## 2020-08-04 ENCOUNTER — APPOINTMENT (OUTPATIENT)
Dept: CARDIOLOGY | Facility: HOSPITAL | Age: 75
End: 2020-08-04

## 2020-08-05 ENCOUNTER — HOSPITAL ENCOUNTER (OUTPATIENT)
Dept: CARDIOLOGY | Facility: HOSPITAL | Age: 75
Discharge: HOME | End: 2020-08-05
Admitting: NURSE PRACTITIONER

## 2020-08-05 VITALS — WEIGHT: 160.5 LBS | BODY MASS INDEX: 22.98 KG/M2 | HEIGHT: 70 IN

## 2020-08-05 DIAGNOSIS — I73.9 PERIPHERAL VASCULAR DISEASE (HCC): ICD-10-CM

## 2020-08-05 DIAGNOSIS — I70.413 ATHEROSCLEROSIS OF AUTOLOGOUS VEIN BYPASS GRAFT OF BOTH LOWER EXTREMITIES WITH INTERMITTENT CLAUDICATION (HCC): Primary | ICD-10-CM

## 2020-08-05 LAB
BH CV GRAFT BRACHIAL PRESSURE LEFT: 146 MMHG
BH CV GRAFT BRACHIAL PRESSURE RIGHT: 151 MMHG
BH CV LEA LEFT ANT TIBIAL A DISTAL PSV: 57.6 CM/S
BH CV LEA LEFT ANT TIBIAL A MID PSV: 47.5 CM/S
BH CV LEA LEFT ANT TIBIAL A PROX PSV: 51.3 CM/S
BH CV LEA LEFT CFA DISTAL PSV: 93.7 CM/S
BH CV LEA LEFT DFA PROX PSV: 89.3 CM/S
BH CV LEA LEFT DPA PRESSURE: 140 MMHG
BH CV LEA LEFT PERONEAL  MID PSV: 54.1 CM/S
BH CV LEA LEFT POPITEAL A  DISTAL PSV: 54.2 CM/S
BH CV LEA LEFT POPITEAL A  PROX PSV: 164 CM/S
BH CV LEA LEFT PTA DISTAL PSV: 35.7 CM/S
BH CV LEA LEFT PTA MID PSV: 60.9 CM/S
BH CV LEA LEFT PTA PRESSURE: 130 MMHG
BH CV LEA LEFT PTA PROX PSV: 65.2 CM/S
BH CV LEA LEFT SFA DISTAL PSV: 72.7 CM/S
BH CV LEA LEFT SFA MID PSV: 56.2 CM/S
BH CV LEA LEFT SFA PROX PSV: 70.7 CM/S
BH CV LEA RIGHT ANT TIBIAL A DISTAL PSV: 37.5 CM/S
BH CV LEA RIGHT ANT TIBIAL A MID PSV: 36.8 CM/S
BH CV LEA RIGHT ANT TIBIAL A PROX PSV: 51.4 CM/S
BH CV LEA RIGHT CFA DISTAL PSV: 423 CM/S
BH CV LEA RIGHT DFA PROX PSV: 45.4 CM/S
BH CV LEA RIGHT DPA PRESSURE: 120 MMHG
BH CV LEA RIGHT PERONEAL  MID PSV: 26.5 CM/S
BH CV LEA RIGHT POPITEAL A  DISTAL PSV: 36.2 CM/S
BH CV LEA RIGHT PTA DISTAL PSV: 24.2 CM/S
BH CV LEA RIGHT PTA MID PSV: 36.2 CM/S
BH CV LEA RIGHT PTA PRESSURE: 125 MMHG
BH CV LEA RIGHT PTA PROX PSV: 34.2 CM/S
BH CV LEA RIGHT SFA DISTAL PSV: 50.3 CM/S
BH CV LEA RIGHT SFA MID PSV: 49.6 CM/S
BH CV LEA RIGHT SFA PROX PSV: 129 CM/S
BH CV LOWER ARTERIAL LEFT ABI RATIO: 0.93
BH CV LOWER ARTERIAL RIGHT ABI RATIO: 0.83

## 2020-08-05 PROCEDURE — 93925 LOWER EXTREMITY STUDY: CPT

## 2020-08-05 PROCEDURE — 93925 LOWER EXTREMITY STUDY: CPT | Performed by: INTERNAL MEDICINE

## 2020-08-14 ENCOUNTER — HOSPITAL ENCOUNTER (OUTPATIENT)
Dept: CT IMAGING | Facility: HOSPITAL | Age: 75
Discharge: HOME | End: 2020-08-14
Admitting: PHYSICIAN ASSISTANT

## 2020-08-14 DIAGNOSIS — I73.9 PERIPHERAL VASCULAR DISEASE (HCC): ICD-10-CM

## 2020-08-14 LAB — CREAT BLDA-MCNC: 0.8 MG/DL (ref 0.6–1.3)

## 2020-08-14 PROCEDURE — 75635 CT ANGIO ABDOMINAL ARTERIES: CPT

## 2020-08-14 PROCEDURE — 82565 ASSAY OF CREATININE: CPT

## 2020-08-14 PROCEDURE — 0 IOPAMIDOL PER 1 ML: Performed by: PHYSICIAN ASSISTANT

## 2020-08-14 RX ADMIN — IOPAMIDOL 125 ML: 755 INJECTION, SOLUTION INTRAVENOUS at 09:03

## 2020-08-20 ENCOUNTER — OFFICE VISIT (OUTPATIENT)
Dept: CARDIAC SURGERY | Facility: CLINIC | Age: 75
End: 2020-08-20

## 2020-08-20 VITALS
WEIGHT: 158 LBS | SYSTOLIC BLOOD PRESSURE: 180 MMHG | BODY MASS INDEX: 22.62 KG/M2 | OXYGEN SATURATION: 98 % | DIASTOLIC BLOOD PRESSURE: 90 MMHG | TEMPERATURE: 97.8 F | HEIGHT: 70 IN | HEART RATE: 61 BPM

## 2020-08-20 DIAGNOSIS — I73.9 PERIPHERAL VASCULAR DISEASE (HCC): Primary | ICD-10-CM

## 2020-08-26 ENCOUNTER — OFFICE VISIT (OUTPATIENT)
Dept: INTERNAL MEDICINE | Facility: CLINIC | Age: 75
End: 2020-08-26

## 2020-08-26 VITALS
BODY MASS INDEX: 22.48 KG/M2 | TEMPERATURE: 97.2 F | HEART RATE: 62 BPM | HEIGHT: 70 IN | RESPIRATION RATE: 16 BRPM | DIASTOLIC BLOOD PRESSURE: 84 MMHG | SYSTOLIC BLOOD PRESSURE: 142 MMHG | WEIGHT: 157 LBS | OXYGEN SATURATION: 98 %

## 2020-08-26 DIAGNOSIS — G89.29 CHRONIC PAIN OF BOTH ANKLES: Primary | ICD-10-CM

## 2020-08-26 DIAGNOSIS — M25.572 CHRONIC PAIN OF BOTH ANKLES: Primary | ICD-10-CM

## 2020-08-26 DIAGNOSIS — M25.571 CHRONIC PAIN OF BOTH ANKLES: Primary | ICD-10-CM

## 2020-08-26 PROCEDURE — 99213 OFFICE O/P EST LOW 20 MIN: CPT | Performed by: NURSE PRACTITIONER

## 2020-11-17 ENCOUNTER — TELEPHONE (OUTPATIENT)
Dept: INTERNAL MEDICINE | Facility: CLINIC | Age: 75
End: 2020-11-17

## 2020-11-23 DIAGNOSIS — E78.5 HYPERLIPIDEMIA, UNSPECIFIED HYPERLIPIDEMIA TYPE: ICD-10-CM

## 2020-11-23 RX ORDER — ATORVASTATIN CALCIUM 20 MG/1
TABLET, FILM COATED ORAL
Qty: 90 TABLET | Refills: 0 | Status: SHIPPED | OUTPATIENT
Start: 2020-11-23 | End: 2021-01-05 | Stop reason: SDUPTHER

## 2020-11-25 ENCOUNTER — OFFICE VISIT (OUTPATIENT)
Dept: INTERNAL MEDICINE | Facility: CLINIC | Age: 75
End: 2020-11-25

## 2020-11-25 VITALS
OXYGEN SATURATION: 99 % | SYSTOLIC BLOOD PRESSURE: 144 MMHG | HEART RATE: 68 BPM | DIASTOLIC BLOOD PRESSURE: 84 MMHG | BODY MASS INDEX: 22.9 KG/M2 | HEIGHT: 70 IN | WEIGHT: 160 LBS | TEMPERATURE: 97.6 F

## 2020-11-25 DIAGNOSIS — G62.9 PERIPHERAL POLYNEUROPATHY: Primary | ICD-10-CM

## 2020-11-25 PROCEDURE — 99213 OFFICE O/P EST LOW 20 MIN: CPT | Performed by: NURSE PRACTITIONER

## 2020-11-25 RX ORDER — DULOXETIN HYDROCHLORIDE 30 MG/1
30 CAPSULE, DELAYED RELEASE ORAL DAILY
Qty: 30 CAPSULE | Refills: 1 | Status: SHIPPED | OUTPATIENT
Start: 2020-11-25 | End: 2021-01-05 | Stop reason: SDUPTHER

## 2021-01-04 DIAGNOSIS — I10 ESSENTIAL HYPERTENSION: ICD-10-CM

## 2021-01-05 ENCOUNTER — LAB (OUTPATIENT)
Dept: LAB | Facility: HOSPITAL | Age: 76
End: 2021-01-05

## 2021-01-05 ENCOUNTER — OFFICE VISIT (OUTPATIENT)
Dept: INTERNAL MEDICINE | Facility: CLINIC | Age: 76
End: 2021-01-05

## 2021-01-05 VITALS
WEIGHT: 155 LBS | BODY MASS INDEX: 22.19 KG/M2 | OXYGEN SATURATION: 98 % | SYSTOLIC BLOOD PRESSURE: 148 MMHG | DIASTOLIC BLOOD PRESSURE: 82 MMHG | HEART RATE: 66 BPM | HEIGHT: 70 IN | TEMPERATURE: 96.9 F

## 2021-01-05 DIAGNOSIS — I10 ESSENTIAL HYPERTENSION: ICD-10-CM

## 2021-01-05 DIAGNOSIS — R73.03 PREDIABETES: ICD-10-CM

## 2021-01-05 DIAGNOSIS — E78.5 HYPERLIPIDEMIA, UNSPECIFIED HYPERLIPIDEMIA TYPE: ICD-10-CM

## 2021-01-05 DIAGNOSIS — G62.9 PERIPHERAL POLYNEUROPATHY: Primary | ICD-10-CM

## 2021-01-05 PROCEDURE — 83036 HEMOGLOBIN GLYCOSYLATED A1C: CPT

## 2021-01-05 PROCEDURE — 80048 BASIC METABOLIC PNL TOTAL CA: CPT

## 2021-01-05 PROCEDURE — 99214 OFFICE O/P EST MOD 30 MIN: CPT | Performed by: NURSE PRACTITIONER

## 2021-01-05 PROCEDURE — 80061 LIPID PANEL: CPT

## 2021-01-05 PROCEDURE — 36415 COLL VENOUS BLD VENIPUNCTURE: CPT

## 2021-01-05 RX ORDER — HYDROCHLOROTHIAZIDE 25 MG/1
25 TABLET ORAL DAILY
Qty: 90 TABLET | Refills: 1 | Status: SHIPPED | OUTPATIENT
Start: 2021-01-05 | End: 2021-07-14 | Stop reason: HOSPADM

## 2021-01-05 RX ORDER — ATORVASTATIN CALCIUM 20 MG/1
20 TABLET, FILM COATED ORAL DAILY
Qty: 90 TABLET | Refills: 1 | Status: SHIPPED | OUTPATIENT
Start: 2021-01-05 | End: 2021-07-14 | Stop reason: SDUPTHER

## 2021-01-05 RX ORDER — CLOPIDOGREL BISULFATE 75 MG/1
TABLET ORAL
Qty: 90 TABLET | Refills: 1 | Status: SHIPPED | OUTPATIENT
Start: 2021-01-05 | End: 2021-07-14 | Stop reason: SDUPTHER

## 2021-01-05 RX ORDER — METOPROLOL SUCCINATE 100 MG/1
100 TABLET, EXTENDED RELEASE ORAL DAILY
Qty: 90 TABLET | Refills: 1 | Status: SHIPPED | OUTPATIENT
Start: 2021-01-05 | End: 2021-07-14 | Stop reason: SDUPTHER

## 2021-01-05 RX ORDER — METOPROLOL SUCCINATE 100 MG/1
TABLET, EXTENDED RELEASE ORAL
Qty: 90 TABLET | Refills: 0 | OUTPATIENT
Start: 2021-01-05

## 2021-01-05 RX ORDER — DULOXETIN HYDROCHLORIDE 30 MG/1
30 CAPSULE, DELAYED RELEASE ORAL DAILY
Qty: 90 CAPSULE | Refills: 1 | Status: SHIPPED | OUTPATIENT
Start: 2021-01-05 | End: 2021-07-14 | Stop reason: SDUPTHER

## 2021-01-05 RX ORDER — AMLODIPINE BESYLATE 10 MG/1
10 TABLET ORAL DAILY
Qty: 90 TABLET | Refills: 1 | Status: SHIPPED | OUTPATIENT
Start: 2021-01-05 | End: 2021-07-14 | Stop reason: SDUPTHER

## 2021-01-06 LAB
ANION GAP SERPL CALCULATED.3IONS-SCNC: 11.8 MMOL/L (ref 5–15)
BUN SERPL-MCNC: 11 MG/DL (ref 8–23)
BUN/CREAT SERPL: 18.6 (ref 7–25)
CALCIUM SPEC-SCNC: 9.8 MG/DL (ref 8.6–10.5)
CHLORIDE SERPL-SCNC: 94 MMOL/L (ref 98–107)
CHOLEST SERPL-MCNC: 170 MG/DL (ref 0–200)
CO2 SERPL-SCNC: 27.2 MMOL/L (ref 22–29)
CREAT SERPL-MCNC: 0.59 MG/DL (ref 0.76–1.27)
GFR SERPL CREATININE-BSD FRML MDRD: 134 ML/MIN/1.73
GLUCOSE SERPL-MCNC: 100 MG/DL (ref 65–99)
HBA1C MFR BLD: 5.71 % (ref 4.8–5.6)
HDLC SERPL-MCNC: 95 MG/DL (ref 40–60)
LDLC SERPL CALC-MCNC: 64 MG/DL (ref 0–100)
LDLC/HDLC SERPL: 0.68 {RATIO}
POTASSIUM SERPL-SCNC: 3.8 MMOL/L (ref 3.5–5.2)
SODIUM SERPL-SCNC: 133 MMOL/L (ref 136–145)
TRIGL SERPL-MCNC: 51 MG/DL (ref 0–150)
VLDLC SERPL-MCNC: 11 MG/DL (ref 5–40)

## 2021-03-15 ENCOUNTER — OFFICE VISIT (OUTPATIENT)
Dept: INTERNAL MEDICINE | Facility: CLINIC | Age: 76
End: 2021-03-15

## 2021-03-15 VITALS
HEIGHT: 70 IN | SYSTOLIC BLOOD PRESSURE: 144 MMHG | WEIGHT: 160 LBS | DIASTOLIC BLOOD PRESSURE: 74 MMHG | BODY MASS INDEX: 22.9 KG/M2 | TEMPERATURE: 97.1 F | HEART RATE: 68 BPM | OXYGEN SATURATION: 98 %

## 2021-03-15 DIAGNOSIS — Z00.00 ENCOUNTER FOR SUBSEQUENT ANNUAL WELLNESS VISIT (AWV) IN MEDICARE PATIENT: Primary | ICD-10-CM

## 2021-03-15 PROCEDURE — G0439 PPPS, SUBSEQ VISIT: HCPCS | Performed by: NURSE PRACTITIONER

## 2021-03-15 RX ORDER — MELATONIN
1000 DAILY
COMMUNITY

## 2021-03-15 RX ORDER — ATORVASTATIN CALCIUM 20 MG/1
1 TABLET, FILM COATED ORAL
COMMUNITY
End: 2021-03-15 | Stop reason: SDUPTHER

## 2021-03-15 RX ORDER — LORATADINE 10 MG/1
10 TABLET ORAL DAILY
COMMUNITY
End: 2021-11-11

## 2021-03-29 ENCOUNTER — HOSPITAL ENCOUNTER (OUTPATIENT)
Dept: CT IMAGING | Facility: HOSPITAL | Age: 76
Discharge: HOME | End: 2021-03-29
Admitting: NURSE PRACTITIONER

## 2021-03-29 DIAGNOSIS — Z00.00 ENCOUNTER FOR SUBSEQUENT ANNUAL WELLNESS VISIT (AWV) IN MEDICARE PATIENT: ICD-10-CM

## 2021-03-29 PROCEDURE — 71271 CT THORAX LUNG CANCER SCR C-: CPT

## 2021-04-27 ENCOUNTER — APPOINTMENT (OUTPATIENT)
Dept: CT IMAGING | Facility: HOSPITAL | Age: 76
End: 2021-04-27

## 2021-04-27 ENCOUNTER — HOSPITAL ENCOUNTER (EMERGENCY)
Facility: HOSPITAL | Age: 76
Discharge: HOME | End: 2021-04-28
Attending: EMERGENCY MEDICINE | Admitting: EMERGENCY MEDICINE

## 2021-04-27 ENCOUNTER — APPOINTMENT (OUTPATIENT)
Dept: GENERAL RADIOLOGY | Facility: HOSPITAL | Age: 76
End: 2021-04-27

## 2021-04-27 DIAGNOSIS — S00.03XA SCALP HEMATOMA, INITIAL ENCOUNTER: ICD-10-CM

## 2021-04-27 DIAGNOSIS — S01.01XA SCALP LACERATION, INITIAL ENCOUNTER: Primary | ICD-10-CM

## 2021-04-27 DIAGNOSIS — F10.929 ALCOHOLIC INTOXICATION WITH COMPLICATION (HCC): ICD-10-CM

## 2021-04-27 DIAGNOSIS — E87.1 HYPONATREMIA: ICD-10-CM

## 2021-04-27 DIAGNOSIS — S09.90XA MINOR HEAD INJURY WITHOUT LOSS OF CONSCIOUSNESS, INITIAL ENCOUNTER: ICD-10-CM

## 2021-04-27 LAB
ALBUMIN SERPL-MCNC: 4.8 G/DL (ref 3.5–5.2)
ALBUMIN/GLOB SERPL: 2.2 G/DL
ALP SERPL-CCNC: 68 U/L (ref 39–117)
ALT SERPL W P-5'-P-CCNC: 16 U/L (ref 1–41)
ANION GAP SERPL CALCULATED.3IONS-SCNC: 9 MMOL/L (ref 5–15)
APAP SERPL-MCNC: <5 MCG/ML (ref 0–30)
AST SERPL-CCNC: 22 U/L (ref 1–40)
BASOPHILS # BLD AUTO: 0.05 10*3/MM3 (ref 0–0.2)
BASOPHILS NFR BLD AUTO: 1.1 % (ref 0–1.5)
BILIRUB SERPL-MCNC: 0.4 MG/DL (ref 0–1.2)
BUN SERPL-MCNC: 7 MG/DL (ref 8–23)
BUN/CREAT SERPL: 9.5 (ref 7–25)
CALCIUM SPEC-SCNC: 9.1 MG/DL (ref 8.6–10.5)
CHLORIDE SERPL-SCNC: 86 MMOL/L (ref 98–107)
CO2 SERPL-SCNC: 31 MMOL/L (ref 22–29)
CREAT SERPL-MCNC: 0.74 MG/DL (ref 0.76–1.27)
DEPRECATED RDW RBC AUTO: 44.2 FL (ref 37–54)
EOSINOPHIL # BLD AUTO: 0.06 10*3/MM3 (ref 0–0.4)
EOSINOPHIL NFR BLD AUTO: 1.3 % (ref 0.3–6.2)
ERYTHROCYTE [DISTWIDTH] IN BLOOD BY AUTOMATED COUNT: 13.1 % (ref 12.3–15.4)
ETHANOL BLD-MCNC: 142 MG/DL (ref 0–10)
GFR SERPL CREATININE-BSD FRML MDRD: 103 ML/MIN/1.73
GLOBULIN UR ELPH-MCNC: 2.2 GM/DL
GLUCOSE SERPL-MCNC: 95 MG/DL (ref 65–99)
HCT VFR BLD AUTO: 40 % (ref 37.5–51)
HGB BLD-MCNC: 14.1 G/DL (ref 13–17.7)
IMM GRANULOCYTES # BLD AUTO: 0.01 10*3/MM3 (ref 0–0.05)
IMM GRANULOCYTES NFR BLD AUTO: 0.2 % (ref 0–0.5)
INR PPP: 0.92 (ref 0.85–1.16)
LYMPHOCYTES # BLD AUTO: 1.46 10*3/MM3 (ref 0.7–3.1)
LYMPHOCYTES NFR BLD AUTO: 30.9 % (ref 19.6–45.3)
MAGNESIUM SERPL-MCNC: 2 MG/DL (ref 1.6–2.4)
MCH RBC QN AUTO: 32.5 PG (ref 26.6–33)
MCHC RBC AUTO-ENTMCNC: 35.3 G/DL (ref 31.5–35.7)
MCV RBC AUTO: 92.2 FL (ref 79–97)
MONOCYTES # BLD AUTO: 0.37 10*3/MM3 (ref 0.1–0.9)
MONOCYTES NFR BLD AUTO: 7.8 % (ref 5–12)
NEUTROPHILS NFR BLD AUTO: 2.78 10*3/MM3 (ref 1.7–7)
NEUTROPHILS NFR BLD AUTO: 58.7 % (ref 42.7–76)
NRBC BLD AUTO-RTO: 0 /100 WBC (ref 0–0.2)
PLATELET # BLD AUTO: 221 10*3/MM3 (ref 140–450)
PMV BLD AUTO: 8.9 FL (ref 6–12)
POTASSIUM SERPL-SCNC: 3.7 MMOL/L (ref 3.5–5.2)
PROT SERPL-MCNC: 7 G/DL (ref 6–8.5)
PROTHROMBIN TIME: 12.1 SECONDS (ref 11.4–14.4)
RBC # BLD AUTO: 4.34 10*6/MM3 (ref 4.14–5.8)
SALICYLATES SERPL-MCNC: <0.3 MG/DL
SODIUM SERPL-SCNC: 126 MMOL/L (ref 136–145)
WBC # BLD AUTO: 4.73 10*3/MM3 (ref 3.4–10.8)

## 2021-04-27 PROCEDURE — 70486 CT MAXILLOFACIAL W/O DYE: CPT

## 2021-04-27 PROCEDURE — 85610 PROTHROMBIN TIME: CPT | Performed by: EMERGENCY MEDICINE

## 2021-04-27 PROCEDURE — 72125 CT NECK SPINE W/O DYE: CPT

## 2021-04-27 PROCEDURE — 93005 ELECTROCARDIOGRAM TRACING: CPT | Performed by: EMERGENCY MEDICINE

## 2021-04-27 PROCEDURE — 71045 X-RAY EXAM CHEST 1 VIEW: CPT

## 2021-04-27 PROCEDURE — 80143 DRUG ASSAY ACETAMINOPHEN: CPT | Performed by: EMERGENCY MEDICINE

## 2021-04-27 PROCEDURE — 80053 COMPREHEN METABOLIC PANEL: CPT | Performed by: EMERGENCY MEDICINE

## 2021-04-27 PROCEDURE — 85025 COMPLETE CBC W/AUTO DIFF WBC: CPT | Performed by: EMERGENCY MEDICINE

## 2021-04-27 PROCEDURE — 80179 DRUG ASSAY SALICYLATE: CPT | Performed by: EMERGENCY MEDICINE

## 2021-04-27 PROCEDURE — 83735 ASSAY OF MAGNESIUM: CPT | Performed by: EMERGENCY MEDICINE

## 2021-04-27 PROCEDURE — 99283 EMERGENCY DEPT VISIT LOW MDM: CPT

## 2021-04-27 PROCEDURE — 70450 CT HEAD/BRAIN W/O DYE: CPT

## 2021-04-27 PROCEDURE — 82077 ASSAY SPEC XCP UR&BREATH IA: CPT | Performed by: EMERGENCY MEDICINE

## 2021-04-27 RX ORDER — LIDOCAINE HYDROCHLORIDE AND EPINEPHRINE 10; 10 MG/ML; UG/ML
10 INJECTION, SOLUTION INFILTRATION; PERINEURAL ONCE
Status: COMPLETED | OUTPATIENT
Start: 2021-04-27 | End: 2021-04-27

## 2021-04-27 RX ADMIN — LIDOCAINE HYDROCHLORIDE,EPINEPHRINE BITARTRATE 10 ML: 10; .01 INJECTION, SOLUTION INFILTRATION; PERINEURAL at 23:05

## 2021-04-27 RX ADMIN — LIDOCAINE HYDROCHLORIDE,EPINEPHRINE BITARTRATE 10 ML: 10; .01 INJECTION, SOLUTION INFILTRATION; PERINEURAL at 22:55

## 2021-04-28 VITALS
OXYGEN SATURATION: 95 % | HEART RATE: 63 BPM | TEMPERATURE: 97.9 F | DIASTOLIC BLOOD PRESSURE: 73 MMHG | SYSTOLIC BLOOD PRESSURE: 126 MMHG | HEIGHT: 70 IN | WEIGHT: 160 LBS | BODY MASS INDEX: 22.9 KG/M2 | RESPIRATION RATE: 16 BRPM

## 2021-04-28 PROCEDURE — 90715 TDAP VACCINE 7 YRS/> IM: CPT | Performed by: EMERGENCY MEDICINE

## 2021-04-28 PROCEDURE — 90471 IMMUNIZATION ADMIN: CPT | Performed by: EMERGENCY MEDICINE

## 2021-04-28 PROCEDURE — 25010000002 TDAP 5-2.5-18.5 LF-MCG/0.5 SUSPENSION: Performed by: EMERGENCY MEDICINE

## 2021-04-28 RX ADMIN — TETANUS TOXOID, REDUCED DIPHTHERIA TOXOID AND ACELLULAR PERTUSSIS VACCINE, ADSORBED 0.5 ML: 5; 2.5; 8; 8; 2.5 SUSPENSION INTRAMUSCULAR at 00:13

## 2021-04-29 ENCOUNTER — OFFICE VISIT (OUTPATIENT)
Dept: INTERNAL MEDICINE | Facility: CLINIC | Age: 76
End: 2021-04-29

## 2021-04-29 VITALS
TEMPERATURE: 97.1 F | HEART RATE: 84 BPM | OXYGEN SATURATION: 99 % | WEIGHT: 160 LBS | DIASTOLIC BLOOD PRESSURE: 74 MMHG | BODY MASS INDEX: 22.9 KG/M2 | SYSTOLIC BLOOD PRESSURE: 132 MMHG | HEIGHT: 70 IN

## 2021-04-29 DIAGNOSIS — S09.90XA TRAUMATIC INJURY OF HEAD, INITIAL ENCOUNTER: Primary | ICD-10-CM

## 2021-04-29 PROCEDURE — 99214 OFFICE O/P EST MOD 30 MIN: CPT | Performed by: NURSE PRACTITIONER

## 2021-05-07 ENCOUNTER — OFFICE VISIT (OUTPATIENT)
Dept: INTERNAL MEDICINE | Facility: CLINIC | Age: 76
End: 2021-05-07

## 2021-05-07 VITALS
BODY MASS INDEX: 22.9 KG/M2 | HEIGHT: 70 IN | OXYGEN SATURATION: 96 % | TEMPERATURE: 97.5 F | HEART RATE: 71 BPM | SYSTOLIC BLOOD PRESSURE: 120 MMHG | DIASTOLIC BLOOD PRESSURE: 78 MMHG | WEIGHT: 160 LBS

## 2021-05-07 DIAGNOSIS — Z48.02 ENCOUNTER FOR REMOVAL OF SUTURES: Primary | ICD-10-CM

## 2021-05-07 DIAGNOSIS — F10.10 ALCOHOL ABUSE: ICD-10-CM

## 2021-05-07 PROCEDURE — 99213 OFFICE O/P EST LOW 20 MIN: CPT | Performed by: NURSE PRACTITIONER

## 2021-07-14 ENCOUNTER — OFFICE VISIT (OUTPATIENT)
Dept: INTERNAL MEDICINE | Facility: CLINIC | Age: 76
End: 2021-07-14

## 2021-07-14 VITALS
HEART RATE: 90 BPM | HEIGHT: 70 IN | WEIGHT: 151.8 LBS | RESPIRATION RATE: 16 BRPM | BODY MASS INDEX: 21.73 KG/M2 | DIASTOLIC BLOOD PRESSURE: 80 MMHG | TEMPERATURE: 97.3 F | SYSTOLIC BLOOD PRESSURE: 138 MMHG | OXYGEN SATURATION: 98 %

## 2021-07-14 DIAGNOSIS — E87.1 HYPONATREMIA: Primary | ICD-10-CM

## 2021-07-14 DIAGNOSIS — G62.9 PERIPHERAL POLYNEUROPATHY: ICD-10-CM

## 2021-07-14 DIAGNOSIS — I10 ESSENTIAL HYPERTENSION: ICD-10-CM

## 2021-07-14 DIAGNOSIS — E78.5 HYPERLIPIDEMIA, UNSPECIFIED HYPERLIPIDEMIA TYPE: ICD-10-CM

## 2021-07-14 PROCEDURE — 99214 OFFICE O/P EST MOD 30 MIN: CPT | Performed by: NURSE PRACTITIONER

## 2021-07-14 RX ORDER — ATORVASTATIN CALCIUM 20 MG/1
20 TABLET, FILM COATED ORAL DAILY
Qty: 90 TABLET | Refills: 1 | Status: SHIPPED | OUTPATIENT
Start: 2021-07-14 | End: 2021-11-29 | Stop reason: SDUPTHER

## 2021-07-14 RX ORDER — METOPROLOL SUCCINATE 100 MG/1
100 TABLET, EXTENDED RELEASE ORAL DAILY
Qty: 90 TABLET | Refills: 1 | Status: SHIPPED | OUTPATIENT
Start: 2021-07-14 | End: 2021-11-29 | Stop reason: SDUPTHER

## 2021-07-14 RX ORDER — CLOPIDOGREL BISULFATE 75 MG/1
TABLET ORAL
Qty: 90 TABLET | Refills: 1 | Status: SHIPPED | OUTPATIENT
Start: 2021-07-14 | End: 2021-11-29 | Stop reason: SDUPTHER

## 2021-07-14 RX ORDER — AMLODIPINE BESYLATE 10 MG/1
10 TABLET ORAL DAILY
Qty: 90 TABLET | Refills: 1 | Status: SHIPPED | OUTPATIENT
Start: 2021-07-14 | End: 2021-11-29 | Stop reason: SDUPTHER

## 2021-07-14 RX ORDER — DULOXETIN HYDROCHLORIDE 30 MG/1
30 CAPSULE, DELAYED RELEASE ORAL DAILY
Qty: 90 CAPSULE | Refills: 1 | Status: SHIPPED | OUTPATIENT
Start: 2021-07-14 | End: 2021-08-26

## 2021-07-15 ENCOUNTER — LAB (OUTPATIENT)
Dept: LAB | Facility: HOSPITAL | Age: 76
End: 2021-07-15

## 2021-07-15 DIAGNOSIS — E87.1 HYPONATREMIA: ICD-10-CM

## 2021-07-15 LAB
ALBUMIN UR-MCNC: 2.5 MG/DL
MAGNESIUM SERPL-MCNC: 2 MG/DL (ref 1.6–2.4)
OSMOLALITY SERPL: 269 MOSM/KG (ref 280–301)
OSMOLALITY UR: 517 MOSM/KG
SODIUM SERPL-SCNC: 127 MMOL/L (ref 136–145)
TSH SERPL DL<=0.05 MIU/L-ACNC: 2.71 UIU/ML (ref 0.27–4.2)
URATE SERPL-MCNC: 2.6 MG/DL (ref 3.4–7)

## 2021-07-15 PROCEDURE — 84295 ASSAY OF SERUM SODIUM: CPT

## 2021-07-15 PROCEDURE — 84443 ASSAY THYROID STIM HORMONE: CPT

## 2021-07-15 PROCEDURE — 36415 COLL VENOUS BLD VENIPUNCTURE: CPT

## 2021-07-15 PROCEDURE — 82043 UR ALBUMIN QUANTITATIVE: CPT

## 2021-07-15 PROCEDURE — 83935 ASSAY OF URINE OSMOLALITY: CPT

## 2021-07-15 PROCEDURE — 84550 ASSAY OF BLOOD/URIC ACID: CPT

## 2021-07-15 PROCEDURE — 83930 ASSAY OF BLOOD OSMOLALITY: CPT

## 2021-07-15 PROCEDURE — 82533 TOTAL CORTISOL: CPT

## 2021-07-15 PROCEDURE — 83735 ASSAY OF MAGNESIUM: CPT

## 2021-07-16 LAB — CORTIS SERPL-MCNC: 12.83 MCG/DL

## 2021-07-20 ENCOUNTER — TELEPHONE (OUTPATIENT)
Dept: INTERNAL MEDICINE | Facility: CLINIC | Age: 76
End: 2021-07-20

## 2021-07-21 ENCOUNTER — OFFICE VISIT (OUTPATIENT)
Dept: INTERNAL MEDICINE | Facility: CLINIC | Age: 76
End: 2021-07-21

## 2021-07-21 VITALS
TEMPERATURE: 97.9 F | HEART RATE: 75 BPM | HEIGHT: 70 IN | OXYGEN SATURATION: 99 % | BODY MASS INDEX: 21.19 KG/M2 | SYSTOLIC BLOOD PRESSURE: 136 MMHG | RESPIRATION RATE: 14 BRPM | WEIGHT: 148 LBS | DIASTOLIC BLOOD PRESSURE: 72 MMHG

## 2021-07-21 DIAGNOSIS — Z48.02 VISIT FOR SUTURE REMOVAL: ICD-10-CM

## 2021-07-21 DIAGNOSIS — S01.112D LEFT EYELID LACERATION, SUBSEQUENT ENCOUNTER: Primary | ICD-10-CM

## 2021-07-21 PROCEDURE — 99213 OFFICE O/P EST LOW 20 MIN: CPT | Performed by: NURSE PRACTITIONER

## 2021-07-21 RX ORDER — IMIPRAMINE HCL 25 MG
25 TABLET ORAL DAILY
COMMUNITY
Start: 2021-04-17 | End: 2021-11-11

## 2021-07-21 RX ORDER — FEXOFENADINE HCL 180 MG/1
1 TABLET ORAL DAILY
COMMUNITY
End: 2021-08-26

## 2021-07-21 RX ORDER — OLOPATADINE HYDROCHLORIDE 1 MG/ML
1 SOLUTION/ DROPS OPHTHALMIC
COMMUNITY
End: 2021-08-26

## 2021-07-23 ENCOUNTER — LAB (OUTPATIENT)
Dept: LAB | Facility: HOSPITAL | Age: 76
End: 2021-07-23

## 2021-07-23 DIAGNOSIS — E87.1 HYPONATREMIA: ICD-10-CM

## 2021-07-23 LAB
COLLECT DURATION TIME UR: 24 HRS
SODIUM 24H UR-SRATE: NORMAL MMOL/(24.H)
SODIUM UR-SCNC: <20 MMOL/L
SPECIMEN VOL 24H UR: 1350 ML

## 2021-07-23 PROCEDURE — 84300 ASSAY OF URINE SODIUM: CPT

## 2021-07-23 PROCEDURE — 81050 URINALYSIS VOLUME MEASURE: CPT

## 2021-08-03 ENCOUNTER — TELEPHONE (OUTPATIENT)
Dept: CARDIAC SURGERY | Facility: CLINIC | Age: 76
End: 2021-08-03

## 2021-08-26 ENCOUNTER — OFFICE VISIT (OUTPATIENT)
Dept: INTERNAL MEDICINE | Facility: CLINIC | Age: 76
End: 2021-08-26

## 2021-08-26 VITALS
HEIGHT: 70 IN | DIASTOLIC BLOOD PRESSURE: 70 MMHG | HEART RATE: 65 BPM | SYSTOLIC BLOOD PRESSURE: 130 MMHG | BODY MASS INDEX: 20.62 KG/M2 | RESPIRATION RATE: 14 BRPM | OXYGEN SATURATION: 98 % | WEIGHT: 144 LBS | TEMPERATURE: 98.2 F

## 2021-08-26 DIAGNOSIS — E87.1 HYPONATREMIA: Primary | ICD-10-CM

## 2021-08-26 DIAGNOSIS — R42 DIZZINESS: ICD-10-CM

## 2021-08-26 DIAGNOSIS — I10 ESSENTIAL HYPERTENSION: ICD-10-CM

## 2021-08-26 DIAGNOSIS — F10.10 ALCOHOL ABUSE: ICD-10-CM

## 2021-08-26 DIAGNOSIS — R13.10 DYSPHAGIA, UNSPECIFIED TYPE: ICD-10-CM

## 2021-08-26 PROCEDURE — 99214 OFFICE O/P EST MOD 30 MIN: CPT | Performed by: NURSE PRACTITIONER

## 2021-08-26 RX ORDER — METOPROLOL TARTRATE 100 MG/1
TABLET ORAL
COMMUNITY
End: 2021-08-26

## 2021-09-08 ENCOUNTER — LAB (OUTPATIENT)
Dept: LAB | Facility: HOSPITAL | Age: 76
End: 2021-09-08

## 2021-09-08 ENCOUNTER — HOSPITAL ENCOUNTER (OUTPATIENT)
Dept: ULTRASOUND IMAGING | Facility: HOSPITAL | Age: 76
Discharge: HOME | End: 2021-09-08

## 2021-09-08 DIAGNOSIS — R13.10 DYSPHAGIA, UNSPECIFIED TYPE: ICD-10-CM

## 2021-09-08 DIAGNOSIS — E87.1 HYPONATREMIA: ICD-10-CM

## 2021-09-08 LAB
ALBUMIN SERPL-MCNC: 5 G/DL (ref 3.5–5.2)
ALBUMIN/GLOB SERPL: 2.4 G/DL
ALP SERPL-CCNC: 74 U/L (ref 39–117)
ALT SERPL W P-5'-P-CCNC: 14 U/L (ref 1–41)
ANION GAP SERPL CALCULATED.3IONS-SCNC: 10.7 MMOL/L (ref 5–15)
AST SERPL-CCNC: 15 U/L (ref 1–40)
BILIRUB SERPL-MCNC: 0.8 MG/DL (ref 0–1.2)
BUN SERPL-MCNC: 7 MG/DL (ref 8–23)
BUN/CREAT SERPL: 9.1 (ref 7–25)
CALCIUM SPEC-SCNC: 9.5 MG/DL (ref 8.6–10.5)
CHLORIDE SERPL-SCNC: 90 MMOL/L (ref 98–107)
CO2 SERPL-SCNC: 26.3 MMOL/L (ref 22–29)
CREAT SERPL-MCNC: 0.77 MG/DL (ref 0.76–1.27)
GFR SERPL CREATININE-BSD FRML MDRD: 99 ML/MIN/1.73
GLOBULIN UR ELPH-MCNC: 2.1 GM/DL
GLUCOSE SERPL-MCNC: 102 MG/DL (ref 65–99)
POTASSIUM SERPL-SCNC: 4.1 MMOL/L (ref 3.5–5.2)
PROT SERPL-MCNC: 7.1 G/DL (ref 6–8.5)
SODIUM SERPL-SCNC: 127 MMOL/L (ref 136–145)

## 2021-09-08 PROCEDURE — 80053 COMPREHEN METABOLIC PANEL: CPT

## 2021-09-08 PROCEDURE — 76536 US EXAM OF HEAD AND NECK: CPT

## 2021-09-10 ENCOUNTER — TELEPHONE (OUTPATIENT)
Dept: INTERNAL MEDICINE | Facility: CLINIC | Age: 76
End: 2021-09-10

## 2021-11-03 ENCOUNTER — TRANSCRIBE ORDERS (OUTPATIENT)
Dept: LAB | Facility: HOSPITAL | Age: 76
End: 2021-11-03

## 2021-11-03 ENCOUNTER — LAB (OUTPATIENT)
Dept: LAB | Facility: HOSPITAL | Age: 76
End: 2021-11-03

## 2021-11-03 DIAGNOSIS — I10 ESSENTIAL HYPERTENSION, MALIGNANT: ICD-10-CM

## 2021-11-03 DIAGNOSIS — E87.1 HYPOSMOLALITY SYNDROME: ICD-10-CM

## 2021-11-03 DIAGNOSIS — E87.1 HYPOSMOLALITY SYNDROME: Primary | ICD-10-CM

## 2021-11-03 LAB
ANION GAP SERPL CALCULATED.3IONS-SCNC: 10.6 MMOL/L (ref 5–15)
BUN SERPL-MCNC: 9 MG/DL (ref 8–23)
BUN/CREAT SERPL: 12 (ref 7–25)
CALCIUM SPEC-SCNC: 9.6 MG/DL (ref 8.6–10.5)
CHLORIDE SERPL-SCNC: 88 MMOL/L (ref 98–107)
CO2 SERPL-SCNC: 26.4 MMOL/L (ref 22–29)
CREAT SERPL-MCNC: 0.75 MG/DL (ref 0.76–1.27)
GFR SERPL CREATININE-BSD FRML MDRD: 102 ML/MIN/1.73
GLUCOSE SERPL-MCNC: 118 MG/DL (ref 65–99)
POTASSIUM SERPL-SCNC: 4.4 MMOL/L (ref 3.5–5.2)
SODIUM SERPL-SCNC: 125 MMOL/L (ref 136–145)
TSH SERPL DL<=0.05 MIU/L-ACNC: 1.67 UIU/ML (ref 0.27–4.2)

## 2021-11-03 PROCEDURE — 84443 ASSAY THYROID STIM HORMONE: CPT

## 2021-11-03 PROCEDURE — 36415 COLL VENOUS BLD VENIPUNCTURE: CPT

## 2021-11-03 PROCEDURE — 80048 BASIC METABOLIC PNL TOTAL CA: CPT

## 2021-11-11 ENCOUNTER — OFFICE VISIT (OUTPATIENT)
Dept: NEUROLOGY | Facility: CLINIC | Age: 76
End: 2021-11-11

## 2021-11-11 VITALS
HEART RATE: 70 BPM | WEIGHT: 146 LBS | BODY MASS INDEX: 20.9 KG/M2 | DIASTOLIC BLOOD PRESSURE: 70 MMHG | SYSTOLIC BLOOD PRESSURE: 136 MMHG | HEIGHT: 70 IN | OXYGEN SATURATION: 98 %

## 2021-11-11 DIAGNOSIS — R42 DIZZINESS: Primary | ICD-10-CM

## 2021-11-11 PROCEDURE — 99204 OFFICE O/P NEW MOD 45 MIN: CPT | Performed by: PSYCHIATRY & NEUROLOGY

## 2021-11-11 RX ORDER — DULOXETIN HYDROCHLORIDE 30 MG/1
CAPSULE, DELAYED RELEASE ORAL
COMMUNITY
Start: 2021-10-13 | End: 2021-11-29

## 2021-11-29 ENCOUNTER — OFFICE VISIT (OUTPATIENT)
Dept: INTERNAL MEDICINE | Facility: CLINIC | Age: 76
End: 2021-11-29

## 2021-11-29 VITALS
TEMPERATURE: 97.6 F | OXYGEN SATURATION: 97 % | HEART RATE: 66 BPM | SYSTOLIC BLOOD PRESSURE: 148 MMHG | BODY MASS INDEX: 21.33 KG/M2 | WEIGHT: 149 LBS | HEIGHT: 70 IN | DIASTOLIC BLOOD PRESSURE: 72 MMHG

## 2021-11-29 DIAGNOSIS — Z23 NEED FOR INFLUENZA VACCINATION: Primary | ICD-10-CM

## 2021-11-29 DIAGNOSIS — E78.5 HYPERLIPIDEMIA, UNSPECIFIED HYPERLIPIDEMIA TYPE: ICD-10-CM

## 2021-11-29 DIAGNOSIS — I10 ESSENTIAL HYPERTENSION: ICD-10-CM

## 2021-11-29 PROBLEM — S01.91XA LACERATION OF HEAD: Status: ACTIVE | Noted: 2021-06-30

## 2021-11-29 PROBLEM — R55 SYNCOPE: Status: ACTIVE | Noted: 2021-06-30

## 2021-11-29 PROBLEM — H11.30 SUBCONJUNCTIVAL HEMORRHAGE: Status: ACTIVE | Noted: 2021-06-30

## 2021-11-29 PROBLEM — H53.8 BLURRED VISION: Status: ACTIVE | Noted: 2021-07-18

## 2021-11-29 PROBLEM — E87.6 HYPOKALEMIA: Status: ACTIVE | Noted: 2021-06-30

## 2021-11-29 PROBLEM — S06.5XAA SUBDURAL HEMATOMA (HCC): Status: ACTIVE | Noted: 2021-06-30

## 2021-11-29 PROBLEM — S02.30XA ORBITAL FLOOR FRACTURE: Status: ACTIVE | Noted: 2021-06-30

## 2021-11-29 PROBLEM — J30.2 SEASONAL ALLERGIES: Status: ACTIVE | Noted: 2021-06-30

## 2021-11-29 PROBLEM — I60.9 SAH (SUBARACHNOID HEMORRHAGE) (HCC): Status: ACTIVE | Noted: 2021-06-30

## 2021-11-29 PROBLEM — T07.XXXA CRITICAL POLYTRAUMA: Status: ACTIVE | Noted: 2021-06-30

## 2021-11-29 PROBLEM — S22.39XA RIB FRACTURE: Status: ACTIVE | Noted: 2021-06-30

## 2021-11-29 PROCEDURE — 99214 OFFICE O/P EST MOD 30 MIN: CPT

## 2021-11-29 PROCEDURE — 90662 IIV NO PRSV INCREASED AG IM: CPT

## 2021-11-29 PROCEDURE — G0008 ADMIN INFLUENZA VIRUS VAC: HCPCS

## 2021-11-29 RX ORDER — CLOPIDOGREL BISULFATE 75 MG/1
TABLET ORAL
Qty: 90 TABLET | Refills: 1 | Status: SHIPPED | OUTPATIENT
Start: 2021-11-29 | End: 2022-08-19 | Stop reason: SDUPTHER

## 2021-11-29 RX ORDER — METOPROLOL SUCCINATE 100 MG/1
100 TABLET, EXTENDED RELEASE ORAL DAILY
Qty: 90 TABLET | Refills: 1 | Status: SHIPPED | OUTPATIENT
Start: 2021-11-29 | End: 2022-04-04 | Stop reason: SDUPTHER

## 2021-11-29 RX ORDER — ATORVASTATIN CALCIUM 20 MG/1
20 TABLET, FILM COATED ORAL DAILY
Qty: 90 TABLET | Refills: 1 | Status: SHIPPED | OUTPATIENT
Start: 2021-11-29 | End: 2022-04-05 | Stop reason: SDUPTHER

## 2021-11-29 RX ORDER — AMLODIPINE BESYLATE 10 MG/1
10 TABLET ORAL DAILY
Qty: 90 TABLET | Refills: 1 | Status: SHIPPED | OUTPATIENT
Start: 2021-11-29 | End: 2022-04-04 | Stop reason: SDUPTHER

## 2021-12-15 ENCOUNTER — HOSPITAL ENCOUNTER (OUTPATIENT)
Dept: MRI IMAGING | Facility: HOSPITAL | Age: 76
Discharge: HOME | End: 2021-12-15
Admitting: PSYCHIATRY & NEUROLOGY

## 2021-12-15 DIAGNOSIS — R42 DIZZINESS: ICD-10-CM

## 2021-12-15 PROCEDURE — 70551 MRI BRAIN STEM W/O DYE: CPT

## 2022-01-04 ENCOUNTER — OFFICE VISIT (OUTPATIENT)
Dept: INTERNAL MEDICINE | Facility: CLINIC | Age: 77
End: 2022-01-04

## 2022-01-04 VITALS
WEIGHT: 154 LBS | DIASTOLIC BLOOD PRESSURE: 80 MMHG | SYSTOLIC BLOOD PRESSURE: 138 MMHG | HEIGHT: 70 IN | TEMPERATURE: 97.4 F | HEART RATE: 69 BPM | OXYGEN SATURATION: 96 % | BODY MASS INDEX: 22.05 KG/M2 | RESPIRATION RATE: 16 BRPM

## 2022-01-04 DIAGNOSIS — I10 ESSENTIAL HYPERTENSION: Primary | ICD-10-CM

## 2022-01-04 DIAGNOSIS — E78.5 DYSLIPIDEMIA: ICD-10-CM

## 2022-01-04 PROCEDURE — 99214 OFFICE O/P EST MOD 30 MIN: CPT

## 2022-01-11 ENCOUNTER — LAB (OUTPATIENT)
Dept: LAB | Facility: HOSPITAL | Age: 77
End: 2022-01-11

## 2022-01-11 DIAGNOSIS — E78.5 DYSLIPIDEMIA: ICD-10-CM

## 2022-01-11 DIAGNOSIS — I10 ESSENTIAL HYPERTENSION: ICD-10-CM

## 2022-01-11 LAB
ALBUMIN SERPL-MCNC: 5 G/DL (ref 3.5–5.2)
ALBUMIN/GLOB SERPL: 2.5 G/DL
ALP SERPL-CCNC: 72 U/L (ref 39–117)
ALT SERPL W P-5'-P-CCNC: 15 U/L (ref 1–41)
ANION GAP SERPL CALCULATED.3IONS-SCNC: 10.1 MMOL/L (ref 5–15)
AST SERPL-CCNC: 20 U/L (ref 1–40)
BASOPHILS # BLD AUTO: 0.05 10*3/MM3 (ref 0–0.2)
BASOPHILS NFR BLD AUTO: 1 % (ref 0–1.5)
BILIRUB SERPL-MCNC: 0.7 MG/DL (ref 0–1.2)
BUN SERPL-MCNC: 10 MG/DL (ref 8–23)
BUN/CREAT SERPL: 13.2 (ref 7–25)
CALCIUM SPEC-SCNC: 9.4 MG/DL (ref 8.6–10.5)
CHLORIDE SERPL-SCNC: 93 MMOL/L (ref 98–107)
CHOLEST SERPL-MCNC: 171 MG/DL (ref 0–200)
CO2 SERPL-SCNC: 27.9 MMOL/L (ref 22–29)
CREAT SERPL-MCNC: 0.76 MG/DL (ref 0.76–1.27)
DEPRECATED RDW RBC AUTO: 40.8 FL (ref 37–54)
EOSINOPHIL # BLD AUTO: 0.04 10*3/MM3 (ref 0–0.4)
EOSINOPHIL NFR BLD AUTO: 0.8 % (ref 0.3–6.2)
ERYTHROCYTE [DISTWIDTH] IN BLOOD BY AUTOMATED COUNT: 11.9 % (ref 12.3–15.4)
GFR SERPL CREATININE-BSD FRML MDRD: 100 ML/MIN/1.73
GLOBULIN UR ELPH-MCNC: 2 GM/DL
GLUCOSE SERPL-MCNC: 113 MG/DL (ref 65–99)
HCT VFR BLD AUTO: 39.5 % (ref 37.5–51)
HDLC SERPL-MCNC: 104 MG/DL (ref 40–60)
HGB BLD-MCNC: 13.9 G/DL (ref 13–17.7)
IMM GRANULOCYTES # BLD AUTO: 0.02 10*3/MM3 (ref 0–0.05)
IMM GRANULOCYTES NFR BLD AUTO: 0.4 % (ref 0–0.5)
LDLC SERPL CALC-MCNC: 55 MG/DL (ref 0–100)
LDLC/HDLC SERPL: 0.53 {RATIO}
LYMPHOCYTES # BLD AUTO: 0.79 10*3/MM3 (ref 0.7–3.1)
LYMPHOCYTES NFR BLD AUTO: 16.4 % (ref 19.6–45.3)
MCH RBC QN AUTO: 32.9 PG (ref 26.6–33)
MCHC RBC AUTO-ENTMCNC: 35.2 G/DL (ref 31.5–35.7)
MCV RBC AUTO: 93.4 FL (ref 79–97)
MONOCYTES # BLD AUTO: 0.55 10*3/MM3 (ref 0.1–0.9)
MONOCYTES NFR BLD AUTO: 11.4 % (ref 5–12)
NEUTROPHILS NFR BLD AUTO: 3.36 10*3/MM3 (ref 1.7–7)
NEUTROPHILS NFR BLD AUTO: 70 % (ref 42.7–76)
NRBC BLD AUTO-RTO: 0 /100 WBC (ref 0–0.2)
PLATELET # BLD AUTO: 236 10*3/MM3 (ref 140–450)
PMV BLD AUTO: 9.8 FL (ref 6–12)
POTASSIUM SERPL-SCNC: 4.2 MMOL/L (ref 3.5–5.2)
PROT SERPL-MCNC: 7 G/DL (ref 6–8.5)
RBC # BLD AUTO: 4.23 10*6/MM3 (ref 4.14–5.8)
SODIUM SERPL-SCNC: 131 MMOL/L (ref 136–145)
TRIGL SERPL-MCNC: 58 MG/DL (ref 0–150)
TSH SERPL DL<=0.05 MIU/L-ACNC: 2.5 UIU/ML (ref 0.27–4.2)
VLDLC SERPL-MCNC: 12 MG/DL (ref 5–40)
WBC NRBC COR # BLD: 4.81 10*3/MM3 (ref 3.4–10.8)

## 2022-01-11 PROCEDURE — 85025 COMPLETE CBC W/AUTO DIFF WBC: CPT

## 2022-01-11 PROCEDURE — 84443 ASSAY THYROID STIM HORMONE: CPT

## 2022-01-11 PROCEDURE — 80061 LIPID PANEL: CPT

## 2022-01-11 PROCEDURE — 80053 COMPREHEN METABOLIC PANEL: CPT

## 2022-01-14 ENCOUNTER — OFFICE VISIT (OUTPATIENT)
Dept: NEUROLOGY | Facility: CLINIC | Age: 77
End: 2022-01-14

## 2022-01-14 VITALS
DIASTOLIC BLOOD PRESSURE: 76 MMHG | WEIGHT: 158 LBS | OXYGEN SATURATION: 98 % | HEIGHT: 70 IN | SYSTOLIC BLOOD PRESSURE: 144 MMHG | HEART RATE: 64 BPM | BODY MASS INDEX: 22.62 KG/M2

## 2022-01-14 DIAGNOSIS — R42 DIZZINESS: Primary | ICD-10-CM

## 2022-01-14 PROCEDURE — 99212 OFFICE O/P EST SF 10 MIN: CPT | Performed by: PSYCHIATRY & NEUROLOGY

## 2022-02-22 ENCOUNTER — OFFICE VISIT (OUTPATIENT)
Dept: INTERNAL MEDICINE | Facility: CLINIC | Age: 77
End: 2022-02-22

## 2022-02-22 VITALS
WEIGHT: 157 LBS | RESPIRATION RATE: 16 BRPM | BODY MASS INDEX: 22.48 KG/M2 | SYSTOLIC BLOOD PRESSURE: 144 MMHG | HEART RATE: 60 BPM | OXYGEN SATURATION: 98 % | HEIGHT: 70 IN | DIASTOLIC BLOOD PRESSURE: 70 MMHG

## 2022-02-22 DIAGNOSIS — R73.01 IMPAIRED FASTING GLUCOSE: ICD-10-CM

## 2022-02-22 DIAGNOSIS — R42 DIZZINESS: Primary | ICD-10-CM

## 2022-02-22 DIAGNOSIS — R05.3 CHRONIC COUGH: ICD-10-CM

## 2022-02-22 DIAGNOSIS — J44.9 CHRONIC OBSTRUCTIVE PULMONARY DISEASE, UNSPECIFIED COPD TYPE: ICD-10-CM

## 2022-02-22 DIAGNOSIS — R06.9 UNSPECIFIED ABNORMALITIES OF BREATHING: ICD-10-CM

## 2022-02-22 DIAGNOSIS — E87.1 HYPONATREMIA: ICD-10-CM

## 2022-02-22 DIAGNOSIS — R91.1 PULMONARY NODULE, RIGHT: ICD-10-CM

## 2022-02-22 PROCEDURE — 99214 OFFICE O/P EST MOD 30 MIN: CPT

## 2022-02-22 RX ORDER — ALBUTEROL SULFATE 90 UG/1
2 AEROSOL, METERED RESPIRATORY (INHALATION) EVERY 4 HOURS PRN
Qty: 8 G | Refills: 1 | Status: SHIPPED | OUTPATIENT
Start: 2022-02-22 | End: 2022-04-04 | Stop reason: SDUPTHER

## 2022-02-28 ENCOUNTER — LAB (OUTPATIENT)
Dept: LAB | Facility: HOSPITAL | Age: 77
End: 2022-02-28

## 2022-02-28 DIAGNOSIS — R73.01 IMPAIRED FASTING GLUCOSE: ICD-10-CM

## 2022-02-28 DIAGNOSIS — R42 DIZZINESS: ICD-10-CM

## 2022-02-28 DIAGNOSIS — R06.9 UNSPECIFIED ABNORMALITIES OF BREATHING: ICD-10-CM

## 2022-02-28 DIAGNOSIS — R05.3 CHRONIC COUGH: ICD-10-CM

## 2022-02-28 LAB
BASOPHILS # BLD AUTO: 0.04 10*3/MM3 (ref 0–0.2)
BASOPHILS NFR BLD AUTO: 1 % (ref 0–1.5)
DEPRECATED RDW RBC AUTO: 39.2 FL (ref 37–54)
EOSINOPHIL # BLD AUTO: 0.03 10*3/MM3 (ref 0–0.4)
EOSINOPHIL NFR BLD AUTO: 0.8 % (ref 0.3–6.2)
ERYTHROCYTE [DISTWIDTH] IN BLOOD BY AUTOMATED COUNT: 11.9 % (ref 12.3–15.4)
HCT VFR BLD AUTO: 40.2 % (ref 37.5–51)
HGB BLD-MCNC: 14.2 G/DL (ref 13–17.7)
IMM GRANULOCYTES # BLD AUTO: 0.01 10*3/MM3 (ref 0–0.05)
IMM GRANULOCYTES NFR BLD AUTO: 0.3 % (ref 0–0.5)
LYMPHOCYTES # BLD AUTO: 0.79 10*3/MM3 (ref 0.7–3.1)
LYMPHOCYTES NFR BLD AUTO: 20.5 % (ref 19.6–45.3)
MCH RBC QN AUTO: 32.2 PG (ref 26.6–33)
MCHC RBC AUTO-ENTMCNC: 35.3 G/DL (ref 31.5–35.7)
MCV RBC AUTO: 91.2 FL (ref 79–97)
MONOCYTES # BLD AUTO: 0.42 10*3/MM3 (ref 0.1–0.9)
MONOCYTES NFR BLD AUTO: 10.9 % (ref 5–12)
NEUTROPHILS NFR BLD AUTO: 2.56 10*3/MM3 (ref 1.7–7)
NEUTROPHILS NFR BLD AUTO: 66.5 % (ref 42.7–76)
NRBC BLD AUTO-RTO: 0 /100 WBC (ref 0–0.2)
PLATELET # BLD AUTO: 226 10*3/MM3 (ref 140–450)
PMV BLD AUTO: 9.4 FL (ref 6–12)
RBC # BLD AUTO: 4.41 10*6/MM3 (ref 4.14–5.8)
WBC NRBC COR # BLD: 3.85 10*3/MM3 (ref 3.4–10.8)

## 2022-02-28 PROCEDURE — 83880 ASSAY OF NATRIURETIC PEPTIDE: CPT

## 2022-02-28 PROCEDURE — 80053 COMPREHEN METABOLIC PANEL: CPT

## 2022-02-28 PROCEDURE — 85025 COMPLETE CBC W/AUTO DIFF WBC: CPT

## 2022-02-28 PROCEDURE — 83036 HEMOGLOBIN GLYCOSYLATED A1C: CPT

## 2022-02-28 PROCEDURE — 36415 COLL VENOUS BLD VENIPUNCTURE: CPT

## 2022-03-01 LAB
ALBUMIN SERPL-MCNC: 4.6 G/DL (ref 3.5–5.2)
ALBUMIN/GLOB SERPL: 2.2 G/DL
ALP SERPL-CCNC: 64 U/L (ref 39–117)
ALT SERPL W P-5'-P-CCNC: 15 U/L (ref 1–41)
ANION GAP SERPL CALCULATED.3IONS-SCNC: 11.7 MMOL/L (ref 5–15)
AST SERPL-CCNC: 13 U/L (ref 1–40)
BILIRUB SERPL-MCNC: 0.6 MG/DL (ref 0–1.2)
BUN SERPL-MCNC: 10 MG/DL (ref 8–23)
BUN/CREAT SERPL: 13 (ref 7–25)
CALCIUM SPEC-SCNC: 9.4 MG/DL (ref 8.6–10.5)
CHLORIDE SERPL-SCNC: 93 MMOL/L (ref 98–107)
CO2 SERPL-SCNC: 24.3 MMOL/L (ref 22–29)
CREAT SERPL-MCNC: 0.77 MG/DL (ref 0.76–1.27)
EGFRCR SERPLBLD CKD-EPI 2021: 93.4 ML/MIN/1.73
GLOBULIN UR ELPH-MCNC: 2.1 GM/DL
GLUCOSE SERPL-MCNC: 118 MG/DL (ref 65–99)
HBA1C MFR BLD: 5.2 % (ref 4.8–5.6)
NT-PROBNP SERPL-MCNC: 476 PG/ML (ref 0–1800)
POTASSIUM SERPL-SCNC: 4.2 MMOL/L (ref 3.5–5.2)
PROT SERPL-MCNC: 6.7 G/DL (ref 6–8.5)
SODIUM SERPL-SCNC: 129 MMOL/L (ref 136–145)

## 2022-03-16 ENCOUNTER — HOSPITAL ENCOUNTER (OUTPATIENT)
Dept: GENERAL RADIOLOGY | Facility: HOSPITAL | Age: 77
Discharge: HOME | End: 2022-03-16

## 2022-03-16 DIAGNOSIS — R05.3 CHRONIC COUGH: ICD-10-CM

## 2022-03-16 PROCEDURE — 71046 X-RAY EXAM CHEST 2 VIEWS: CPT

## 2022-03-28 ENCOUNTER — HOSPITAL ENCOUNTER (OUTPATIENT)
Dept: CT IMAGING | Facility: HOSPITAL | Age: 77
Discharge: HOME | End: 2022-03-28

## 2022-03-28 DIAGNOSIS — R91.1 PULMONARY NODULE, RIGHT: ICD-10-CM

## 2022-03-28 PROCEDURE — 71250 CT THORAX DX C-: CPT

## 2022-03-29 DIAGNOSIS — M84.68XA PATHOLOGICAL FRACTURE IN OTHER DISEASE, OTHER SITE, INITIAL ENCOUNTER FOR FRACTURE: ICD-10-CM

## 2022-03-29 DIAGNOSIS — M43.9 COMPRESSION DEFORMITY OF VERTEBRA: Primary | ICD-10-CM

## 2022-03-29 DIAGNOSIS — S22.20XA CLOSED FRACTURE OF STERNUM, UNSPECIFIED PORTION OF STERNUM, INITIAL ENCOUNTER: ICD-10-CM

## 2022-04-04 ENCOUNTER — APPOINTMENT (OUTPATIENT)
Dept: CT IMAGING | Facility: HOSPITAL | Age: 77
End: 2022-04-04

## 2022-04-04 ENCOUNTER — OFFICE VISIT (OUTPATIENT)
Dept: INTERNAL MEDICINE | Facility: CLINIC | Age: 77
End: 2022-04-04

## 2022-04-04 VITALS
WEIGHT: 156 LBS | SYSTOLIC BLOOD PRESSURE: 142 MMHG | HEIGHT: 70 IN | OXYGEN SATURATION: 96 % | HEART RATE: 54 BPM | DIASTOLIC BLOOD PRESSURE: 62 MMHG | BODY MASS INDEX: 22.33 KG/M2 | RESPIRATION RATE: 16 BRPM

## 2022-04-04 DIAGNOSIS — E78.5 HYPERLIPIDEMIA, UNSPECIFIED HYPERLIPIDEMIA TYPE: ICD-10-CM

## 2022-04-04 DIAGNOSIS — I10 ESSENTIAL HYPERTENSION: Primary | ICD-10-CM

## 2022-04-04 DIAGNOSIS — J44.9 CHRONIC OBSTRUCTIVE PULMONARY DISEASE, UNSPECIFIED COPD TYPE: ICD-10-CM

## 2022-04-04 DIAGNOSIS — Z71.6 ENCOUNTER FOR SMOKING CESSATION COUNSELING: ICD-10-CM

## 2022-04-04 PROCEDURE — 99214 OFFICE O/P EST MOD 30 MIN: CPT

## 2022-04-04 RX ORDER — AMLODIPINE BESYLATE 10 MG/1
10 TABLET ORAL DAILY
Qty: 90 TABLET | Refills: 1 | Status: SHIPPED | OUTPATIENT
Start: 2022-04-04 | End: 2022-08-19 | Stop reason: SDUPTHER

## 2022-04-04 RX ORDER — METOPROLOL SUCCINATE 100 MG/1
100 TABLET, EXTENDED RELEASE ORAL DAILY
Qty: 90 TABLET | Refills: 1 | Status: SHIPPED | OUTPATIENT
Start: 2022-04-04 | End: 2022-08-19 | Stop reason: SDUPTHER

## 2022-04-04 RX ORDER — ALBUTEROL SULFATE 90 UG/1
2 AEROSOL, METERED RESPIRATORY (INHALATION) EVERY 4 HOURS PRN
Qty: 8 G | Refills: 1 | Status: SHIPPED | OUTPATIENT
Start: 2022-04-04 | End: 2022-05-24 | Stop reason: SDUPTHER

## 2022-04-04 RX ORDER — NICOTINE 21 MG/24HR
1 PATCH, TRANSDERMAL 24 HOURS TRANSDERMAL EVERY 24 HOURS
Qty: 14 PATCH | Refills: 0 | Status: SHIPPED | OUTPATIENT
Start: 2022-04-04 | End: 2022-04-18

## 2022-04-04 RX ORDER — TIOTROPIUM BROMIDE AND OLODATEROL 3.124; 2.736 UG/1; UG/1
2 SPRAY, METERED RESPIRATORY (INHALATION)
Qty: 1 EACH | Refills: 0 | COMMUNITY
Start: 2022-04-04

## 2022-04-04 RX ORDER — NICOTINE 21 MG/24HR
PATCH, TRANSDERMAL 24 HOURS TRANSDERMAL
Qty: 42 PATCH | Refills: 0 | Status: SHIPPED | OUTPATIENT
Start: 2022-04-04 | End: 2022-08-19

## 2022-04-05 RX ORDER — ATORVASTATIN CALCIUM 20 MG/1
20 TABLET, FILM COATED ORAL DAILY
Qty: 90 TABLET | Refills: 1 | Status: SHIPPED | OUTPATIENT
Start: 2022-04-05 | End: 2022-08-19 | Stop reason: SDUPTHER

## 2022-04-14 ENCOUNTER — LAB (OUTPATIENT)
Dept: LAB | Facility: HOSPITAL | Age: 77
End: 2022-04-14

## 2022-04-14 DIAGNOSIS — I10 ESSENTIAL HYPERTENSION: ICD-10-CM

## 2022-04-14 DIAGNOSIS — M43.9 COMPRESSION DEFORMITY OF VERTEBRA: ICD-10-CM

## 2022-04-14 DIAGNOSIS — M84.68XA PATHOLOGICAL FRACTURE IN OTHER DISEASE, OTHER SITE, INITIAL ENCOUNTER FOR FRACTURE: ICD-10-CM

## 2022-04-14 LAB
ALBUMIN SERPL-MCNC: 5 G/DL (ref 3.5–5.2)
ALBUMIN/GLOB SERPL: 2.4 G/DL
ALP SERPL-CCNC: 70 U/L (ref 39–117)
ALT SERPL W P-5'-P-CCNC: 14 U/L (ref 1–41)
ANION GAP SERPL CALCULATED.3IONS-SCNC: 13.3 MMOL/L (ref 5–15)
AST SERPL-CCNC: 17 U/L (ref 1–40)
BASOPHILS # BLD AUTO: 0.05 10*3/MM3 (ref 0–0.2)
BASOPHILS NFR BLD AUTO: 1.1 % (ref 0–1.5)
BILIRUB SERPL-MCNC: 0.6 MG/DL (ref 0–1.2)
BUN SERPL-MCNC: 10 MG/DL (ref 8–23)
BUN/CREAT SERPL: 13 (ref 7–25)
CALCIUM SPEC-SCNC: 9.3 MG/DL (ref 8.6–10.5)
CALCIUM SPEC-SCNC: 9.4 MG/DL (ref 8.6–10.5)
CHLORIDE SERPL-SCNC: 95 MMOL/L (ref 98–107)
CO2 SERPL-SCNC: 24.7 MMOL/L (ref 22–29)
CREAT SERPL-MCNC: 0.77 MG/DL (ref 0.76–1.27)
DEPRECATED RDW RBC AUTO: 40.5 FL (ref 37–54)
EGFRCR SERPLBLD CKD-EPI 2021: 93.4 ML/MIN/1.73
EOSINOPHIL # BLD AUTO: 0.05 10*3/MM3 (ref 0–0.4)
EOSINOPHIL NFR BLD AUTO: 1.1 % (ref 0.3–6.2)
ERYTHROCYTE [DISTWIDTH] IN BLOOD BY AUTOMATED COUNT: 12.6 % (ref 12.3–15.4)
GLOBULIN UR ELPH-MCNC: 2.1 GM/DL
GLUCOSE SERPL-MCNC: 112 MG/DL (ref 65–99)
HCT VFR BLD AUTO: 41.5 % (ref 37.5–51)
HGB BLD-MCNC: 14.3 G/DL (ref 13–17.7)
IMM GRANULOCYTES # BLD AUTO: 0.01 10*3/MM3 (ref 0–0.05)
IMM GRANULOCYTES NFR BLD AUTO: 0.2 % (ref 0–0.5)
LYMPHOCYTES # BLD AUTO: 1 10*3/MM3 (ref 0.7–3.1)
LYMPHOCYTES NFR BLD AUTO: 22.2 % (ref 19.6–45.3)
MCH RBC QN AUTO: 31.2 PG (ref 26.6–33)
MCHC RBC AUTO-ENTMCNC: 34.5 G/DL (ref 31.5–35.7)
MCV RBC AUTO: 90.6 FL (ref 79–97)
MONOCYTES # BLD AUTO: 0.6 10*3/MM3 (ref 0.1–0.9)
MONOCYTES NFR BLD AUTO: 13.3 % (ref 5–12)
NEUTROPHILS NFR BLD AUTO: 2.79 10*3/MM3 (ref 1.7–7)
NEUTROPHILS NFR BLD AUTO: 62.1 % (ref 42.7–76)
NRBC BLD AUTO-RTO: 0 /100 WBC (ref 0–0.2)
PLATELET # BLD AUTO: 239 10*3/MM3 (ref 140–450)
PMV BLD AUTO: 9.5 FL (ref 6–12)
POTASSIUM SERPL-SCNC: 4.4 MMOL/L (ref 3.5–5.2)
PROT SERPL-MCNC: 7.1 G/DL (ref 6–8.5)
PTH-INTACT SERPL-MCNC: 44.9 PG/ML (ref 15–65)
RBC # BLD AUTO: 4.58 10*6/MM3 (ref 4.14–5.8)
SODIUM SERPL-SCNC: 133 MMOL/L (ref 136–145)
TSH SERPL DL<=0.05 MIU/L-ACNC: 3.04 UIU/ML (ref 0.27–4.2)
WBC NRBC COR # BLD: 4.5 10*3/MM3 (ref 3.4–10.8)

## 2022-04-14 PROCEDURE — 84402 ASSAY OF FREE TESTOSTERONE: CPT

## 2022-04-14 PROCEDURE — 85025 COMPLETE CBC W/AUTO DIFF WBC: CPT

## 2022-04-14 PROCEDURE — 83970 ASSAY OF PARATHORMONE: CPT

## 2022-04-14 PROCEDURE — 80053 COMPREHEN METABOLIC PANEL: CPT

## 2022-04-14 PROCEDURE — 84443 ASSAY THYROID STIM HORMONE: CPT

## 2022-04-14 PROCEDURE — 84403 ASSAY OF TOTAL TESTOSTERONE: CPT

## 2022-04-14 PROCEDURE — 36415 COLL VENOUS BLD VENIPUNCTURE: CPT

## 2022-04-26 LAB
TESTOST FREE SERPL-MCNC: 6.1 PG/ML (ref 6.6–18.1)
TESTOST SERPL-MCNC: 403.4 NG/DL (ref 264–916)

## 2022-05-24 ENCOUNTER — OFFICE VISIT (OUTPATIENT)
Dept: INTERNAL MEDICINE | Facility: CLINIC | Age: 77
End: 2022-05-24

## 2022-05-24 VITALS
TEMPERATURE: 97.8 F | BODY MASS INDEX: 21.76 KG/M2 | SYSTOLIC BLOOD PRESSURE: 148 MMHG | WEIGHT: 152 LBS | HEART RATE: 62 BPM | RESPIRATION RATE: 16 BRPM | OXYGEN SATURATION: 99 % | HEIGHT: 70 IN | DIASTOLIC BLOOD PRESSURE: 64 MMHG

## 2022-05-24 DIAGNOSIS — J44.9 CHRONIC OBSTRUCTIVE PULMONARY DISEASE, UNSPECIFIED COPD TYPE: ICD-10-CM

## 2022-05-24 DIAGNOSIS — F32.0 CURRENT MILD EPISODE OF MAJOR DEPRESSIVE DISORDER WITHOUT PRIOR EPISODE: Primary | ICD-10-CM

## 2022-05-24 PROCEDURE — 99214 OFFICE O/P EST MOD 30 MIN: CPT

## 2022-05-24 RX ORDER — ALBUTEROL SULFATE 90 UG/1
2 AEROSOL, METERED RESPIRATORY (INHALATION) EVERY 4 HOURS PRN
Qty: 8 G | Refills: 2 | Status: SHIPPED | OUTPATIENT
Start: 2022-05-24 | End: 2022-06-07 | Stop reason: SDUPTHER

## 2022-05-24 RX ORDER — DULOXETIN HYDROCHLORIDE 30 MG/1
CAPSULE, DELAYED RELEASE ORAL
COMMUNITY
End: 2022-05-24 | Stop reason: SINTOL

## 2022-05-24 RX ORDER — SERTRALINE HYDROCHLORIDE 25 MG/1
25 TABLET, FILM COATED ORAL DAILY
Qty: 30 TABLET | Refills: 0 | Status: SHIPPED | OUTPATIENT
Start: 2022-05-24 | End: 2022-06-07

## 2022-06-07 ENCOUNTER — OFFICE VISIT (OUTPATIENT)
Dept: INTERNAL MEDICINE | Facility: CLINIC | Age: 77
End: 2022-06-07

## 2022-06-07 VITALS
OXYGEN SATURATION: 98 % | BODY MASS INDEX: 21.47 KG/M2 | HEART RATE: 60 BPM | SYSTOLIC BLOOD PRESSURE: 140 MMHG | WEIGHT: 150 LBS | DIASTOLIC BLOOD PRESSURE: 70 MMHG | TEMPERATURE: 97.7 F | HEIGHT: 70 IN | RESPIRATION RATE: 16 BRPM

## 2022-06-07 DIAGNOSIS — F32.0 CURRENT MILD EPISODE OF MAJOR DEPRESSIVE DISORDER WITHOUT PRIOR EPISODE: ICD-10-CM

## 2022-06-07 DIAGNOSIS — J44.9 CHRONIC OBSTRUCTIVE PULMONARY DISEASE, UNSPECIFIED COPD TYPE: ICD-10-CM

## 2022-06-07 PROCEDURE — 99214 OFFICE O/P EST MOD 30 MIN: CPT

## 2022-06-07 RX ORDER — ALBUTEROL SULFATE 90 UG/1
2 AEROSOL, METERED RESPIRATORY (INHALATION) EVERY 4 HOURS PRN
Qty: 8 G | Refills: 3 | Status: SHIPPED | OUTPATIENT
Start: 2022-06-07 | End: 2022-08-19 | Stop reason: SDUPTHER

## 2022-06-30 ENCOUNTER — HOSPITAL ENCOUNTER (OUTPATIENT)
Dept: BONE DENSITY | Facility: HOSPITAL | Age: 77
Discharge: HOME | End: 2022-06-30

## 2022-06-30 DIAGNOSIS — M43.9 COMPRESSION DEFORMITY OF VERTEBRA: ICD-10-CM

## 2022-06-30 DIAGNOSIS — M84.68XA PATHOLOGICAL FRACTURE IN OTHER DISEASE, OTHER SITE, INITIAL ENCOUNTER FOR FRACTURE: ICD-10-CM

## 2022-06-30 PROCEDURE — 77080 DXA BONE DENSITY AXIAL: CPT

## 2022-08-19 ENCOUNTER — OFFICE VISIT (OUTPATIENT)
Dept: INTERNAL MEDICINE | Facility: CLINIC | Age: 77
End: 2022-08-19

## 2022-08-19 VITALS
RESPIRATION RATE: 16 BRPM | WEIGHT: 149 LBS | SYSTOLIC BLOOD PRESSURE: 134 MMHG | DIASTOLIC BLOOD PRESSURE: 84 MMHG | TEMPERATURE: 97.4 F | HEART RATE: 60 BPM | HEIGHT: 70 IN | BODY MASS INDEX: 21.33 KG/M2 | OXYGEN SATURATION: 99 %

## 2022-08-19 DIAGNOSIS — C61 PROSTATE CANCER: ICD-10-CM

## 2022-08-19 DIAGNOSIS — I10 ESSENTIAL HYPERTENSION: Primary | ICD-10-CM

## 2022-08-19 DIAGNOSIS — H61.20 CERUMEN IN AUDITORY CANAL ON EXAMINATION: ICD-10-CM

## 2022-08-19 DIAGNOSIS — I73.9 PERIPHERAL VASCULAR DISEASE: ICD-10-CM

## 2022-08-19 DIAGNOSIS — J44.9 CHRONIC OBSTRUCTIVE PULMONARY DISEASE, UNSPECIFIED COPD TYPE: ICD-10-CM

## 2022-08-19 DIAGNOSIS — R42 DIZZINESS: ICD-10-CM

## 2022-08-19 DIAGNOSIS — J30.2 SEASONAL ALLERGIES: ICD-10-CM

## 2022-08-19 DIAGNOSIS — M85.852 OSTEOPENIA OF NECKS OF BOTH FEMURS: ICD-10-CM

## 2022-08-19 DIAGNOSIS — F32.A DEPRESSION, UNSPECIFIED DEPRESSION TYPE: ICD-10-CM

## 2022-08-19 DIAGNOSIS — M85.851 OSTEOPENIA OF NECKS OF BOTH FEMURS: ICD-10-CM

## 2022-08-19 DIAGNOSIS — F10.10 ALCOHOL ABUSE: ICD-10-CM

## 2022-08-19 DIAGNOSIS — E78.5 HYPERLIPIDEMIA, UNSPECIFIED HYPERLIPIDEMIA TYPE: ICD-10-CM

## 2022-08-19 PROCEDURE — 91305 COVID-19 (PFIZER) 12+ YRS: CPT | Performed by: NURSE PRACTITIONER

## 2022-08-19 PROCEDURE — 99214 OFFICE O/P EST MOD 30 MIN: CPT | Performed by: NURSE PRACTITIONER

## 2022-08-19 PROCEDURE — 0054A COVID-19 (PFIZER) 12+ YRS: CPT | Performed by: NURSE PRACTITIONER

## 2022-08-19 RX ORDER — CLOPIDOGREL BISULFATE 75 MG/1
TABLET ORAL
Qty: 90 TABLET | Refills: 1 | Status: SHIPPED | OUTPATIENT
Start: 2022-08-19 | End: 2023-01-20 | Stop reason: SDUPTHER

## 2022-08-19 RX ORDER — ALBUTEROL SULFATE 90 UG/1
2 AEROSOL, METERED RESPIRATORY (INHALATION) EVERY 4 HOURS PRN
Qty: 8 G | Refills: 3 | Status: SHIPPED | OUTPATIENT
Start: 2022-08-19

## 2022-08-19 RX ORDER — METOPROLOL SUCCINATE 100 MG/1
100 TABLET, EXTENDED RELEASE ORAL DAILY
Qty: 90 TABLET | Refills: 1 | Status: SHIPPED | OUTPATIENT
Start: 2022-08-19 | End: 2023-01-20 | Stop reason: SDUPTHER

## 2022-08-19 RX ORDER — AMLODIPINE BESYLATE 10 MG/1
10 TABLET ORAL DAILY
Qty: 90 TABLET | Refills: 1 | Status: SHIPPED | OUTPATIENT
Start: 2022-08-19 | End: 2023-01-20 | Stop reason: SDUPTHER

## 2022-08-19 RX ORDER — ATORVASTATIN CALCIUM 20 MG/1
20 TABLET, FILM COATED ORAL DAILY
Qty: 90 TABLET | Refills: 1 | Status: SHIPPED | OUTPATIENT
Start: 2022-08-19 | End: 2023-01-20 | Stop reason: SDUPTHER

## 2022-08-19 RX ORDER — FLUTICASONE PROPIONATE 50 MCG
2 SPRAY, SUSPENSION (ML) NASAL DAILY
Qty: 18.2 ML | Refills: 2 | Status: SHIPPED | OUTPATIENT
Start: 2022-08-19

## 2022-09-21 ENCOUNTER — LAB (OUTPATIENT)
Dept: LAB | Facility: HOSPITAL | Age: 77
End: 2022-09-21

## 2022-09-21 ENCOUNTER — OFFICE VISIT (OUTPATIENT)
Dept: INTERNAL MEDICINE | Facility: CLINIC | Age: 77
End: 2022-09-21

## 2022-09-21 VITALS
HEIGHT: 70 IN | WEIGHT: 147 LBS | DIASTOLIC BLOOD PRESSURE: 80 MMHG | OXYGEN SATURATION: 99 % | TEMPERATURE: 97.9 F | RESPIRATION RATE: 16 BRPM | HEART RATE: 56 BPM | BODY MASS INDEX: 21.05 KG/M2 | SYSTOLIC BLOOD PRESSURE: 168 MMHG

## 2022-09-21 DIAGNOSIS — Z00.00 ENCOUNTER FOR MEDICARE ANNUAL WELLNESS EXAM: ICD-10-CM

## 2022-09-21 DIAGNOSIS — I10 ESSENTIAL HYPERTENSION: ICD-10-CM

## 2022-09-21 DIAGNOSIS — Z23 NEED FOR INFLUENZA VACCINATION: Primary | ICD-10-CM

## 2022-09-21 DIAGNOSIS — R42 DIZZINESS: ICD-10-CM

## 2022-09-21 LAB
CHOLEST SERPL-MCNC: 152 MG/DL (ref 0–200)
HDLC SERPL-MCNC: 101 MG/DL (ref 40–60)
LDLC SERPL CALC-MCNC: 41 MG/DL (ref 0–100)
LDLC/HDLC SERPL: 0.43 {RATIO}
TRIGL SERPL-MCNC: 40 MG/DL (ref 0–150)
TSH SERPL DL<=0.05 MIU/L-ACNC: 1.81 UIU/ML (ref 0.27–4.2)
VLDLC SERPL-MCNC: 10 MG/DL (ref 5–40)

## 2022-09-21 PROCEDURE — 99213 OFFICE O/P EST LOW 20 MIN: CPT | Performed by: NURSE PRACTITIONER

## 2022-09-21 PROCEDURE — 83036 HEMOGLOBIN GLYCOSYLATED A1C: CPT

## 2022-09-21 PROCEDURE — 84443 ASSAY THYROID STIM HORMONE: CPT

## 2022-09-21 PROCEDURE — 36415 COLL VENOUS BLD VENIPUNCTURE: CPT

## 2022-09-21 PROCEDURE — 80053 COMPREHEN METABOLIC PANEL: CPT

## 2022-09-21 PROCEDURE — G0008 ADMIN INFLUENZA VIRUS VAC: HCPCS | Performed by: NURSE PRACTITIONER

## 2022-09-21 PROCEDURE — 90662 IIV NO PRSV INCREASED AG IM: CPT | Performed by: NURSE PRACTITIONER

## 2022-09-21 PROCEDURE — G0439 PPPS, SUBSEQ VISIT: HCPCS | Performed by: NURSE PRACTITIONER

## 2022-09-21 PROCEDURE — 85027 COMPLETE CBC AUTOMATED: CPT

## 2022-09-21 PROCEDURE — 80061 LIPID PANEL: CPT

## 2022-09-22 LAB
ALBUMIN SERPL-MCNC: 4.9 G/DL (ref 3.5–5.2)
ALBUMIN/GLOB SERPL: 2.3 G/DL
ALP SERPL-CCNC: 65 U/L (ref 39–117)
ALT SERPL W P-5'-P-CCNC: 17 U/L (ref 1–41)
ANION GAP SERPL CALCULATED.3IONS-SCNC: 11.8 MMOL/L (ref 5–15)
AST SERPL-CCNC: 19 U/L (ref 1–40)
BILIRUB SERPL-MCNC: 0.9 MG/DL (ref 0–1.2)
BUN SERPL-MCNC: 9 MG/DL (ref 8–23)
BUN/CREAT SERPL: 11.8 (ref 7–25)
CALCIUM SPEC-SCNC: 9.6 MG/DL (ref 8.6–10.5)
CHLORIDE SERPL-SCNC: 96 MMOL/L (ref 98–107)
CO2 SERPL-SCNC: 23.2 MMOL/L (ref 22–29)
CREAT SERPL-MCNC: 0.76 MG/DL (ref 0.76–1.27)
DEPRECATED RDW RBC AUTO: 45.5 FL (ref 37–54)
EGFRCR SERPLBLD CKD-EPI 2021: 93.7 ML/MIN/1.73
ERYTHROCYTE [DISTWIDTH] IN BLOOD BY AUTOMATED COUNT: 13.3 % (ref 12.3–15.4)
GLOBULIN UR ELPH-MCNC: 2.1 GM/DL
GLUCOSE SERPL-MCNC: 90 MG/DL (ref 65–99)
HBA1C MFR BLD: 5 % (ref 4.8–5.6)
HCT VFR BLD AUTO: 42 % (ref 37.5–51)
HGB BLD-MCNC: 14.4 G/DL (ref 13–17.7)
MCH RBC QN AUTO: 32.4 PG (ref 26.6–33)
MCHC RBC AUTO-ENTMCNC: 34.3 G/DL (ref 31.5–35.7)
MCV RBC AUTO: 94.4 FL (ref 79–97)
PLATELET # BLD AUTO: 215 10*3/MM3 (ref 140–450)
PMV BLD AUTO: 9.7 FL (ref 6–12)
POTASSIUM SERPL-SCNC: 4 MMOL/L (ref 3.5–5.2)
PROT SERPL-MCNC: 7 G/DL (ref 6–8.5)
RBC # BLD AUTO: 4.45 10*6/MM3 (ref 4.14–5.8)
SODIUM SERPL-SCNC: 131 MMOL/L (ref 136–145)
WBC NRBC COR # BLD: 5.05 10*3/MM3 (ref 3.4–10.8)

## 2022-09-27 ENCOUNTER — LAB (OUTPATIENT)
Dept: LAB | Facility: HOSPITAL | Age: 77
End: 2022-09-27

## 2022-09-27 LAB
ALBUMIN UR-MCNC: <1.2 MG/DL
CREAT UR-MCNC: 108.5 MG/DL
MICROALBUMIN/CREAT UR: NORMAL MG/G{CREAT}

## 2022-09-27 PROCEDURE — 82043 UR ALBUMIN QUANTITATIVE: CPT | Performed by: NURSE PRACTITIONER

## 2022-09-27 PROCEDURE — 82570 ASSAY OF URINE CREATININE: CPT | Performed by: NURSE PRACTITIONER

## 2022-11-07 ENCOUNTER — TREATMENT (OUTPATIENT)
Dept: PHYSICAL THERAPY | Facility: CLINIC | Age: 77
End: 2022-11-07

## 2022-11-07 DIAGNOSIS — R42 VERTIGO: Primary | ICD-10-CM

## 2022-11-07 PROCEDURE — 97110 THERAPEUTIC EXERCISES: CPT | Performed by: PHYSICAL THERAPIST

## 2022-11-07 PROCEDURE — 97161 PT EVAL LOW COMPLEX 20 MIN: CPT | Performed by: PHYSICAL THERAPIST

## 2022-11-16 ENCOUNTER — TREATMENT (OUTPATIENT)
Dept: PHYSICAL THERAPY | Facility: CLINIC | Age: 77
End: 2022-11-16

## 2022-11-16 DIAGNOSIS — R42 VERTIGO: Primary | ICD-10-CM

## 2022-11-16 PROCEDURE — 97112 NEUROMUSCULAR REEDUCATION: CPT | Performed by: PHYSICAL THERAPIST

## 2022-11-16 PROCEDURE — 97116 GAIT TRAINING THERAPY: CPT | Performed by: PHYSICAL THERAPIST

## 2022-11-16 PROCEDURE — 97110 THERAPEUTIC EXERCISES: CPT | Performed by: PHYSICAL THERAPIST

## 2022-11-30 ENCOUNTER — TREATMENT (OUTPATIENT)
Dept: PHYSICAL THERAPY | Facility: CLINIC | Age: 77
End: 2022-11-30

## 2022-11-30 DIAGNOSIS — R26.89 BALANCE PROBLEM: Primary | ICD-10-CM

## 2022-11-30 PROCEDURE — 97110 THERAPEUTIC EXERCISES: CPT | Performed by: PHYSICAL THERAPIST

## 2022-11-30 PROCEDURE — 97112 NEUROMUSCULAR REEDUCATION: CPT | Performed by: PHYSICAL THERAPIST

## 2022-11-30 PROCEDURE — 97116 GAIT TRAINING THERAPY: CPT | Performed by: PHYSICAL THERAPIST

## 2022-12-06 ENCOUNTER — TREATMENT (OUTPATIENT)
Dept: PHYSICAL THERAPY | Facility: CLINIC | Age: 77
End: 2022-12-06

## 2022-12-06 DIAGNOSIS — R26.89 BALANCE PROBLEM: Primary | ICD-10-CM

## 2022-12-06 PROCEDURE — 97110 THERAPEUTIC EXERCISES: CPT | Performed by: PHYSICAL THERAPIST

## 2022-12-06 PROCEDURE — 97112 NEUROMUSCULAR REEDUCATION: CPT | Performed by: PHYSICAL THERAPIST

## 2022-12-06 PROCEDURE — 97116 GAIT TRAINING THERAPY: CPT | Performed by: PHYSICAL THERAPIST

## 2022-12-13 ENCOUNTER — TREATMENT (OUTPATIENT)
Dept: PHYSICAL THERAPY | Facility: CLINIC | Age: 77
End: 2022-12-13

## 2022-12-13 DIAGNOSIS — R26.89 BALANCE PROBLEM: Primary | ICD-10-CM

## 2022-12-13 PROCEDURE — 97112 NEUROMUSCULAR REEDUCATION: CPT | Performed by: PHYSICAL THERAPIST

## 2022-12-13 PROCEDURE — 97110 THERAPEUTIC EXERCISES: CPT | Performed by: PHYSICAL THERAPIST

## 2022-12-13 PROCEDURE — 97116 GAIT TRAINING THERAPY: CPT | Performed by: PHYSICAL THERAPIST

## 2022-12-20 ENCOUNTER — TREATMENT (OUTPATIENT)
Dept: PHYSICAL THERAPY | Facility: CLINIC | Age: 77
End: 2022-12-20

## 2022-12-20 DIAGNOSIS — R26.89 BALANCE PROBLEM: Primary | ICD-10-CM

## 2022-12-20 PROCEDURE — 97110 THERAPEUTIC EXERCISES: CPT | Performed by: PHYSICAL THERAPIST

## 2022-12-20 PROCEDURE — 97112 NEUROMUSCULAR REEDUCATION: CPT | Performed by: PHYSICAL THERAPIST

## 2022-12-20 PROCEDURE — 97116 GAIT TRAINING THERAPY: CPT | Performed by: PHYSICAL THERAPIST

## 2023-01-03 ENCOUNTER — TELEPHONE (OUTPATIENT)
Dept: PHYSICAL THERAPY | Facility: CLINIC | Age: 78
End: 2023-01-03

## 2023-01-09 ENCOUNTER — TELEPHONE (OUTPATIENT)
Dept: PHYSICAL THERAPY | Facility: CLINIC | Age: 78
End: 2023-01-09
Payer: MEDICARE

## 2023-01-11 ENCOUNTER — OFFICE VISIT (OUTPATIENT)
Dept: INTERNAL MEDICINE | Facility: CLINIC | Age: 78
End: 2023-01-11
Payer: MEDICARE

## 2023-01-11 VITALS
BODY MASS INDEX: 21.62 KG/M2 | SYSTOLIC BLOOD PRESSURE: 154 MMHG | HEART RATE: 62 BPM | OXYGEN SATURATION: 97 % | WEIGHT: 151 LBS | RESPIRATION RATE: 16 BRPM | DIASTOLIC BLOOD PRESSURE: 70 MMHG | TEMPERATURE: 97.1 F | HEIGHT: 70 IN

## 2023-01-11 DIAGNOSIS — Z23 NEED FOR VACCINATION: ICD-10-CM

## 2023-01-11 DIAGNOSIS — M79.604 RIGHT LEG PAIN: Primary | ICD-10-CM

## 2023-01-11 DIAGNOSIS — L03.115 CELLULITIS OF RIGHT LOWER EXTREMITY: ICD-10-CM

## 2023-01-11 PROCEDURE — 99213 OFFICE O/P EST LOW 20 MIN: CPT | Performed by: NURSE PRACTITIONER

## 2023-01-11 RX ORDER — DOXYCYCLINE HYCLATE 100 MG/1
100 CAPSULE ORAL 2 TIMES DAILY
Qty: 20 CAPSULE | Refills: 0 | Status: SHIPPED | OUTPATIENT
Start: 2023-01-11 | End: 2023-01-21

## 2023-01-11 RX ORDER — CEPHALEXIN 500 MG/1
500 CAPSULE ORAL 4 TIMES DAILY
Qty: 20 CAPSULE | Refills: 0 | Status: SHIPPED | OUTPATIENT
Start: 2023-01-11 | End: 2023-01-16

## 2023-01-12 ENCOUNTER — HOSPITAL ENCOUNTER (OUTPATIENT)
Dept: CARDIOLOGY | Facility: HOSPITAL | Age: 78
Discharge: HOME | End: 2023-01-12
Admitting: NURSE PRACTITIONER
Payer: MEDICARE

## 2023-01-12 VITALS — WEIGHT: 151 LBS | BODY MASS INDEX: 21.62 KG/M2 | HEIGHT: 70 IN

## 2023-01-12 DIAGNOSIS — M79.604 RIGHT LEG PAIN: ICD-10-CM

## 2023-01-12 LAB
BH CV LOWER VASCULAR LEFT COMMON FEMORAL AUGMENT: NORMAL
BH CV LOWER VASCULAR LEFT COMMON FEMORAL COMPRESS: NORMAL
BH CV LOWER VASCULAR LEFT COMMON FEMORAL PHASIC: NORMAL
BH CV LOWER VASCULAR LEFT COMMON FEMORAL SPONT: NORMAL
BH CV LOWER VASCULAR RIGHT COMMON FEMORAL COMPRESS: NORMAL
BH CV LOWER VASCULAR RIGHT COMMON FEMORAL PHASIC: NORMAL
BH CV LOWER VASCULAR RIGHT COMMON FEMORAL SPONT: NORMAL
BH CV LOWER VASCULAR RIGHT DISTAL FEMORAL COMPRESS: NORMAL
BH CV LOWER VASCULAR RIGHT GASTRONEMIUS COMPRESS: NORMAL
BH CV LOWER VASCULAR RIGHT GREATER SAPH AK COMPRESS: NORMAL
BH CV LOWER VASCULAR RIGHT GREATER SAPH BK COMPRESS: NORMAL
BH CV LOWER VASCULAR RIGHT LESSER SAPH COMPRESS: NORMAL
BH CV LOWER VASCULAR RIGHT MID FEMORAL AUGMENT: NORMAL
BH CV LOWER VASCULAR RIGHT MID FEMORAL COMPRESS: NORMAL
BH CV LOWER VASCULAR RIGHT MID FEMORAL PHASIC: NORMAL
BH CV LOWER VASCULAR RIGHT MID FEMORAL SPONT: NORMAL
BH CV LOWER VASCULAR RIGHT PERONEAL COMPRESS: NORMAL
BH CV LOWER VASCULAR RIGHT POPLITEAL AUGMENT: NORMAL
BH CV LOWER VASCULAR RIGHT POPLITEAL COMPRESS: NORMAL
BH CV LOWER VASCULAR RIGHT POPLITEAL PHASIC: NORMAL
BH CV LOWER VASCULAR RIGHT POPLITEAL SPONT: NORMAL
BH CV LOWER VASCULAR RIGHT POSTERIOR TIBIAL COMPRESS: NORMAL
BH CV LOWER VASCULAR RIGHT PROFUNDA FEMORAL COMPRESS: NORMAL
BH CV LOWER VASCULAR RIGHT PROXIMAL FEMORAL COMPRESS: NORMAL
BH CV LOWER VASCULAR RIGHT SAPHENOFEMORAL JUNCTION COMPRESS: NORMAL
MAXIMAL PREDICTED HEART RATE: 144 BPM
STRESS TARGET HR: 122 BPM

## 2023-01-12 PROCEDURE — 93971 EXTREMITY STUDY: CPT | Performed by: INTERNAL MEDICINE

## 2023-01-12 PROCEDURE — 93971 EXTREMITY STUDY: CPT

## 2023-01-20 ENCOUNTER — OFFICE VISIT (OUTPATIENT)
Dept: INTERNAL MEDICINE | Facility: CLINIC | Age: 78
End: 2023-01-20
Payer: MEDICARE

## 2023-01-20 VITALS
RESPIRATION RATE: 16 BRPM | BODY MASS INDEX: 21.76 KG/M2 | HEART RATE: 88 BPM | WEIGHT: 152 LBS | HEIGHT: 70 IN | OXYGEN SATURATION: 99 % | SYSTOLIC BLOOD PRESSURE: 146 MMHG | TEMPERATURE: 97 F | DIASTOLIC BLOOD PRESSURE: 80 MMHG

## 2023-01-20 DIAGNOSIS — I10 ESSENTIAL HYPERTENSION: ICD-10-CM

## 2023-01-20 DIAGNOSIS — E78.5 HYPERLIPIDEMIA, UNSPECIFIED HYPERLIPIDEMIA TYPE: ICD-10-CM

## 2023-01-20 DIAGNOSIS — Z51.89 VISIT FOR WOUND CHECK: Primary | ICD-10-CM

## 2023-01-20 PROCEDURE — 99213 OFFICE O/P EST LOW 20 MIN: CPT | Performed by: NURSE PRACTITIONER

## 2023-01-20 RX ORDER — CLOPIDOGREL BISULFATE 75 MG/1
TABLET ORAL
Qty: 90 TABLET | Refills: 3 | Status: SHIPPED | OUTPATIENT
Start: 2023-01-20

## 2023-01-20 RX ORDER — ATORVASTATIN CALCIUM 20 MG/1
20 TABLET, FILM COATED ORAL DAILY
Qty: 90 TABLET | Refills: 3 | Status: SHIPPED | OUTPATIENT
Start: 2023-01-20

## 2023-01-20 RX ORDER — METOPROLOL SUCCINATE 100 MG/1
100 TABLET, EXTENDED RELEASE ORAL DAILY
Qty: 90 TABLET | Refills: 3 | Status: SHIPPED | OUTPATIENT
Start: 2023-01-20

## 2023-01-20 RX ORDER — AMLODIPINE BESYLATE 10 MG/1
10 TABLET ORAL DAILY
Qty: 90 TABLET | Refills: 3 | Status: SHIPPED | OUTPATIENT
Start: 2023-01-20

## 2023-04-12 ENCOUNTER — OFFICE VISIT (OUTPATIENT)
Dept: INTERNAL MEDICINE | Facility: CLINIC | Age: 78
End: 2023-04-12
Payer: MEDICARE

## 2023-04-12 VITALS
DIASTOLIC BLOOD PRESSURE: 76 MMHG | HEART RATE: 64 BPM | RESPIRATION RATE: 16 BRPM | WEIGHT: 155 LBS | SYSTOLIC BLOOD PRESSURE: 124 MMHG | OXYGEN SATURATION: 96 % | BODY MASS INDEX: 22.19 KG/M2 | HEIGHT: 70 IN | TEMPERATURE: 97.4 F

## 2023-04-12 DIAGNOSIS — M79.661 PAIN OF RIGHT LOWER LEG: ICD-10-CM

## 2023-04-12 DIAGNOSIS — I10 ESSENTIAL HYPERTENSION: Primary | ICD-10-CM

## 2023-04-12 DIAGNOSIS — I73.9 PAD (PERIPHERAL ARTERY DISEASE): ICD-10-CM

## 2023-04-12 PROCEDURE — 3074F SYST BP LT 130 MM HG: CPT | Performed by: NURSE PRACTITIONER

## 2023-04-12 PROCEDURE — 99213 OFFICE O/P EST LOW 20 MIN: CPT | Performed by: NURSE PRACTITIONER

## 2023-04-12 PROCEDURE — 3078F DIAST BP <80 MM HG: CPT | Performed by: NURSE PRACTITIONER

## 2023-04-12 PROCEDURE — 1160F RVW MEDS BY RX/DR IN RCRD: CPT | Performed by: NURSE PRACTITIONER

## 2023-04-12 PROCEDURE — 1159F MED LIST DOCD IN RCRD: CPT | Performed by: NURSE PRACTITIONER

## 2023-04-24 DIAGNOSIS — E78.5 HYPERLIPIDEMIA, UNSPECIFIED HYPERLIPIDEMIA TYPE: ICD-10-CM

## 2023-04-24 RX ORDER — ATORVASTATIN CALCIUM 20 MG/1
TABLET, FILM COATED ORAL
Qty: 90 TABLET | Refills: 1 | Status: SHIPPED | OUTPATIENT
Start: 2023-04-24

## 2023-05-12 DIAGNOSIS — I10 ESSENTIAL HYPERTENSION: ICD-10-CM

## 2023-05-12 RX ORDER — METOPROLOL SUCCINATE 100 MG/1
100 TABLET, EXTENDED RELEASE ORAL DAILY
Qty: 90 TABLET | Refills: 3 | Status: SHIPPED | OUTPATIENT
Start: 2023-05-12

## 2023-09-23 ENCOUNTER — OFFICE VISIT (OUTPATIENT)
Dept: INTERNAL MEDICINE | Facility: CLINIC | Age: 78
End: 2023-09-23
Payer: MEDICARE

## 2023-09-23 VITALS
OXYGEN SATURATION: 99 % | HEART RATE: 67 BPM | WEIGHT: 145 LBS | HEIGHT: 70 IN | RESPIRATION RATE: 16 BRPM | DIASTOLIC BLOOD PRESSURE: 80 MMHG | BODY MASS INDEX: 20.76 KG/M2 | SYSTOLIC BLOOD PRESSURE: 140 MMHG | TEMPERATURE: 98.4 F

## 2023-09-23 DIAGNOSIS — M85.851 OSTEOPENIA OF NECKS OF BOTH FEMURS: ICD-10-CM

## 2023-09-23 DIAGNOSIS — J44.9 CHRONIC OBSTRUCTIVE PULMONARY DISEASE, UNSPECIFIED COPD TYPE: ICD-10-CM

## 2023-09-23 DIAGNOSIS — C61 PROSTATE CANCER: ICD-10-CM

## 2023-09-23 DIAGNOSIS — I10 ESSENTIAL HYPERTENSION: ICD-10-CM

## 2023-09-23 DIAGNOSIS — Z00.00 HEALTHCARE MAINTENANCE: ICD-10-CM

## 2023-09-23 DIAGNOSIS — I73.9 PERIPHERAL VASCULAR DISEASE: ICD-10-CM

## 2023-09-23 DIAGNOSIS — M85.852 OSTEOPENIA OF NECKS OF BOTH FEMURS: ICD-10-CM

## 2023-09-23 DIAGNOSIS — Z00.00 MEDICARE ANNUAL WELLNESS VISIT, SUBSEQUENT: Primary | ICD-10-CM

## 2023-09-23 DIAGNOSIS — R45.851 SUICIDAL IDEATION: ICD-10-CM

## 2023-09-23 DIAGNOSIS — E78.5 HYPERLIPIDEMIA, UNSPECIFIED HYPERLIPIDEMIA TYPE: ICD-10-CM

## 2023-09-23 DIAGNOSIS — J30.2 SEASONAL ALLERGIES: ICD-10-CM

## 2023-09-23 DIAGNOSIS — N32.81 OAB (OVERACTIVE BLADDER): ICD-10-CM

## 2023-09-23 DIAGNOSIS — F32.A DEPRESSION, UNSPECIFIED DEPRESSION TYPE: ICD-10-CM

## 2023-09-23 RX ORDER — IRBESARTAN 150 MG/1
150 TABLET ORAL DAILY
Qty: 30 TABLET | Refills: 1 | Status: SHIPPED | OUTPATIENT
Start: 2023-09-23

## 2023-09-23 RX ORDER — BUDESONIDE, GLYCOPYRROLATE, AND FORMOTEROL FUMARATE 160; 9; 4.8 UG/1; UG/1; UG/1
2 AEROSOL, METERED RESPIRATORY (INHALATION) 2 TIMES DAILY
Qty: 10.7 G | Refills: 2 | Status: SHIPPED | OUTPATIENT
Start: 2023-09-23

## 2023-10-12 ENCOUNTER — TELEPHONE (OUTPATIENT)
Dept: INTERNAL MEDICINE | Facility: CLINIC | Age: 78
End: 2023-10-12
Payer: MEDICARE

## 2023-10-12 ENCOUNTER — LAB (OUTPATIENT)
Dept: LAB | Facility: HOSPITAL | Age: 78
End: 2023-10-12
Payer: MEDICARE

## 2023-10-12 ENCOUNTER — OFFICE VISIT (OUTPATIENT)
Dept: BEHAVIORAL HEALTH | Facility: CLINIC | Age: 78
End: 2023-10-12
Payer: MEDICARE

## 2023-10-12 VITALS
SYSTOLIC BLOOD PRESSURE: 136 MMHG | BODY MASS INDEX: 21.05 KG/M2 | WEIGHT: 147 LBS | HEIGHT: 70 IN | DIASTOLIC BLOOD PRESSURE: 78 MMHG | HEART RATE: 62 BPM | OXYGEN SATURATION: 97 %

## 2023-10-12 DIAGNOSIS — F33.1 MAJOR DEPRESSIVE DISORDER, RECURRENT EPISODE, MODERATE: Primary | ICD-10-CM

## 2023-10-12 DIAGNOSIS — Z00.00 HEALTHCARE MAINTENANCE: ICD-10-CM

## 2023-10-12 LAB
CHOLEST SERPL-MCNC: 184 MG/DL (ref 0–200)
HBA1C MFR BLD: 5 % (ref 4.8–5.6)
HDLC SERPL-MCNC: 120 MG/DL (ref 40–60)
LDLC SERPL CALC-MCNC: 52 MG/DL (ref 0–100)
LDLC/HDLC SERPL: 0.43 {RATIO}
TRIGL SERPL-MCNC: 60 MG/DL (ref 0–150)
TSH SERPL DL<=0.05 MIU/L-ACNC: 2.08 UIU/ML (ref 0.27–4.2)
VLDLC SERPL-MCNC: 12 MG/DL (ref 5–40)

## 2023-10-12 PROCEDURE — 80061 LIPID PANEL: CPT

## 2023-10-12 PROCEDURE — 84443 ASSAY THYROID STIM HORMONE: CPT

## 2023-10-12 PROCEDURE — 80053 COMPREHEN METABOLIC PANEL: CPT

## 2023-10-12 PROCEDURE — 85027 COMPLETE CBC AUTOMATED: CPT

## 2023-10-12 PROCEDURE — 83036 HEMOGLOBIN GLYCOSYLATED A1C: CPT

## 2023-10-12 RX ORDER — SERTRALINE HYDROCHLORIDE 25 MG/1
25 TABLET, FILM COATED ORAL DAILY
Qty: 30 TABLET | Refills: 0 | Status: SHIPPED | OUTPATIENT
Start: 2023-10-12

## 2023-10-13 LAB
ALBUMIN SERPL-MCNC: 4.9 G/DL (ref 3.5–5.2)
ALBUMIN/GLOB SERPL: 2 G/DL
ALP SERPL-CCNC: 70 U/L (ref 39–117)
ALT SERPL W P-5'-P-CCNC: 20 U/L (ref 1–41)
ANION GAP SERPL CALCULATED.3IONS-SCNC: 13.7 MMOL/L (ref 5–15)
AST SERPL-CCNC: 24 U/L (ref 1–40)
BILIRUB SERPL-MCNC: 0.7 MG/DL (ref 0–1.2)
BUN SERPL-MCNC: 6 MG/DL (ref 8–23)
BUN/CREAT SERPL: 7.9 (ref 7–25)
CALCIUM SPEC-SCNC: 9.6 MG/DL (ref 8.6–10.5)
CHLORIDE SERPL-SCNC: 94 MMOL/L (ref 98–107)
CO2 SERPL-SCNC: 24.3 MMOL/L (ref 22–29)
CREAT SERPL-MCNC: 0.76 MG/DL (ref 0.76–1.27)
DEPRECATED RDW RBC AUTO: 43.8 FL (ref 37–54)
EGFRCR SERPLBLD CKD-EPI 2021: 93.2 ML/MIN/1.73
ERYTHROCYTE [DISTWIDTH] IN BLOOD BY AUTOMATED COUNT: 13 % (ref 12.3–15.4)
GLOBULIN UR ELPH-MCNC: 2.4 GM/DL
GLUCOSE SERPL-MCNC: 95 MG/DL (ref 65–99)
HCT VFR BLD AUTO: 39.5 % (ref 37.5–51)
HGB BLD-MCNC: 14.3 G/DL (ref 13–17.7)
MCH RBC QN AUTO: 33.3 PG (ref 26.6–33)
MCHC RBC AUTO-ENTMCNC: 36.2 G/DL (ref 31.5–35.7)
MCV RBC AUTO: 92.1 FL (ref 79–97)
PLATELET # BLD AUTO: 279 10*3/MM3 (ref 140–450)
PMV BLD AUTO: 9.6 FL (ref 6–12)
POTASSIUM SERPL-SCNC: 4.5 MMOL/L (ref 3.5–5.2)
PROT SERPL-MCNC: 7.3 G/DL (ref 6–8.5)
RBC # BLD AUTO: 4.29 10*6/MM3 (ref 4.14–5.8)
SODIUM SERPL-SCNC: 132 MMOL/L (ref 136–145)
WBC NRBC COR # BLD: 6.7 10*3/MM3 (ref 3.4–10.8)

## 2023-10-19 ENCOUNTER — OFFICE VISIT (OUTPATIENT)
Dept: INTERNAL MEDICINE | Facility: CLINIC | Age: 78
End: 2023-10-19
Payer: MEDICARE

## 2023-10-19 VITALS
RESPIRATION RATE: 16 BRPM | TEMPERATURE: 97.4 F | HEIGHT: 70 IN | BODY MASS INDEX: 20.76 KG/M2 | OXYGEN SATURATION: 97 % | DIASTOLIC BLOOD PRESSURE: 80 MMHG | WEIGHT: 145 LBS | HEART RATE: 68 BPM | SYSTOLIC BLOOD PRESSURE: 138 MMHG

## 2023-10-19 DIAGNOSIS — Z12.12 ENCOUNTER FOR COLORECTAL CANCER SCREENING: ICD-10-CM

## 2023-10-19 DIAGNOSIS — I10 ESSENTIAL HYPERTENSION: Primary | ICD-10-CM

## 2023-10-19 DIAGNOSIS — J44.9 CHRONIC OBSTRUCTIVE PULMONARY DISEASE, UNSPECIFIED COPD TYPE: ICD-10-CM

## 2023-10-19 DIAGNOSIS — F10.10 ALCOHOL ABUSE: ICD-10-CM

## 2023-10-19 DIAGNOSIS — Z23 NEED FOR INFLUENZA VACCINATION: ICD-10-CM

## 2023-10-19 DIAGNOSIS — Z12.11 ENCOUNTER FOR COLORECTAL CANCER SCREENING: ICD-10-CM

## 2023-10-19 DIAGNOSIS — F32.A DEPRESSION, UNSPECIFIED DEPRESSION TYPE: ICD-10-CM

## 2023-10-19 DIAGNOSIS — I73.9 PAD (PERIPHERAL ARTERY DISEASE): ICD-10-CM

## 2023-10-19 DIAGNOSIS — E87.1 HYPONATREMIA: ICD-10-CM

## 2023-10-19 PROCEDURE — 3075F SYST BP GE 130 - 139MM HG: CPT | Performed by: NURSE PRACTITIONER

## 2023-10-19 PROCEDURE — 3079F DIAST BP 80-89 MM HG: CPT | Performed by: NURSE PRACTITIONER

## 2023-10-19 PROCEDURE — 99214 OFFICE O/P EST MOD 30 MIN: CPT | Performed by: NURSE PRACTITIONER

## 2023-10-19 PROCEDURE — G0008 ADMIN INFLUENZA VIRUS VAC: HCPCS | Performed by: NURSE PRACTITIONER

## 2023-10-19 PROCEDURE — 90662 IIV NO PRSV INCREASED AG IM: CPT | Performed by: NURSE PRACTITIONER

## 2023-10-19 RX ORDER — LOSARTAN POTASSIUM 25 MG/1
25 TABLET ORAL DAILY
Qty: 30 TABLET | Refills: 1 | Status: SHIPPED | OUTPATIENT
Start: 2023-10-19

## 2023-10-23 ENCOUNTER — OFFICE VISIT (OUTPATIENT)
Dept: BEHAVIORAL HEALTH | Facility: CLINIC | Age: 78
End: 2023-10-23
Payer: MEDICARE

## 2023-10-23 DIAGNOSIS — F33.1 MAJOR DEPRESSIVE DISORDER, RECURRENT EPISODE, MODERATE: Primary | ICD-10-CM

## 2023-10-23 PROCEDURE — 90791 PSYCH DIAGNOSTIC EVALUATION: CPT | Performed by: SOCIAL WORKER

## 2023-10-26 ENCOUNTER — LAB (OUTPATIENT)
Dept: LAB | Facility: HOSPITAL | Age: 78
End: 2023-10-26
Payer: MEDICARE

## 2023-10-26 ENCOUNTER — OFFICE VISIT (OUTPATIENT)
Dept: BEHAVIORAL HEALTH | Facility: CLINIC | Age: 78
End: 2023-10-26
Payer: MEDICARE

## 2023-10-26 VITALS
DIASTOLIC BLOOD PRESSURE: 82 MMHG | HEART RATE: 63 BPM | WEIGHT: 145 LBS | HEIGHT: 70 IN | SYSTOLIC BLOOD PRESSURE: 132 MMHG | OXYGEN SATURATION: 98 % | BODY MASS INDEX: 20.76 KG/M2

## 2023-10-26 DIAGNOSIS — E87.1 HYPONATREMIA: ICD-10-CM

## 2023-10-26 DIAGNOSIS — F33.1 MAJOR DEPRESSIVE DISORDER, RECURRENT EPISODE, MODERATE: Primary | ICD-10-CM

## 2023-10-26 LAB
ALBUMIN UR-MCNC: 2.8 MG/DL
CREAT UR-MCNC: 221.4 MG/DL
MICROALBUMIN/CREAT UR: 12.6 MG/G (ref 0–29)
SODIUM UR-SCNC: 30 MMOL/L
TSH SERPL DL<=0.05 MIU/L-ACNC: 1.69 UIU/ML (ref 0.27–4.2)

## 2023-10-26 PROCEDURE — 84300 ASSAY OF URINE SODIUM: CPT

## 2023-10-26 PROCEDURE — 83930 ASSAY OF BLOOD OSMOLALITY: CPT

## 2023-10-26 PROCEDURE — 84443 ASSAY THYROID STIM HORMONE: CPT

## 2023-10-26 PROCEDURE — 82043 UR ALBUMIN QUANTITATIVE: CPT | Performed by: NURSE PRACTITIONER

## 2023-10-26 PROCEDURE — 82570 ASSAY OF URINE CREATININE: CPT | Performed by: NURSE PRACTITIONER

## 2023-10-26 PROCEDURE — 83935 ASSAY OF URINE OSMOLALITY: CPT

## 2023-10-27 LAB
OSMOLALITY SERPL: 270 MOSM/KG (ref 280–301)
OSMOLALITY UR: 556 MOSM/KG

## 2023-11-14 ENCOUNTER — OFFICE VISIT (OUTPATIENT)
Dept: BEHAVIORAL HEALTH | Facility: CLINIC | Age: 78
End: 2023-11-14
Payer: MEDICARE

## 2023-11-14 DIAGNOSIS — F33.1 MAJOR DEPRESSIVE DISORDER, RECURRENT EPISODE, MODERATE: Primary | ICD-10-CM

## 2023-11-15 ENCOUNTER — OFFICE VISIT (OUTPATIENT)
Dept: INTERNAL MEDICINE | Facility: CLINIC | Age: 78
End: 2023-11-15
Payer: MEDICARE

## 2023-11-15 VITALS
HEART RATE: 65 BPM | SYSTOLIC BLOOD PRESSURE: 120 MMHG | TEMPERATURE: 97.4 F | OXYGEN SATURATION: 98 % | WEIGHT: 145 LBS | BODY MASS INDEX: 20.76 KG/M2 | DIASTOLIC BLOOD PRESSURE: 70 MMHG | HEIGHT: 70 IN | RESPIRATION RATE: 16 BRPM

## 2023-11-15 DIAGNOSIS — F10.10 ALCOHOL ABUSE: ICD-10-CM

## 2023-11-15 DIAGNOSIS — F32.A DEPRESSION, UNSPECIFIED DEPRESSION TYPE: ICD-10-CM

## 2023-11-15 DIAGNOSIS — R19.7 DIARRHEA, UNSPECIFIED TYPE: ICD-10-CM

## 2023-11-15 DIAGNOSIS — E87.1 HYPONATREMIA: Primary | ICD-10-CM

## 2023-11-15 DIAGNOSIS — I10 ESSENTIAL HYPERTENSION: ICD-10-CM

## 2023-11-15 PROCEDURE — 1159F MED LIST DOCD IN RCRD: CPT | Performed by: NURSE PRACTITIONER

## 2023-11-15 PROCEDURE — 99214 OFFICE O/P EST MOD 30 MIN: CPT | Performed by: NURSE PRACTITIONER

## 2023-11-15 PROCEDURE — 3074F SYST BP LT 130 MM HG: CPT | Performed by: NURSE PRACTITIONER

## 2023-11-15 PROCEDURE — 3078F DIAST BP <80 MM HG: CPT | Performed by: NURSE PRACTITIONER

## 2023-11-15 PROCEDURE — 1160F RVW MEDS BY RX/DR IN RCRD: CPT | Performed by: NURSE PRACTITIONER

## 2023-11-15 RX ORDER — METOPROLOL SUCCINATE 200 MG/1
200 TABLET, EXTENDED RELEASE ORAL DAILY
Qty: 90 TABLET | Refills: 3 | Status: SHIPPED | OUTPATIENT
Start: 2023-11-15

## 2023-11-27 ENCOUNTER — OFFICE VISIT (OUTPATIENT)
Dept: BEHAVIORAL HEALTH | Facility: CLINIC | Age: 78
End: 2023-11-27
Payer: MEDICARE

## 2023-11-27 VITALS
HEIGHT: 70 IN | OXYGEN SATURATION: 96 % | HEART RATE: 73 BPM | SYSTOLIC BLOOD PRESSURE: 132 MMHG | WEIGHT: 145 LBS | BODY MASS INDEX: 20.76 KG/M2 | DIASTOLIC BLOOD PRESSURE: 74 MMHG

## 2023-11-27 DIAGNOSIS — F41.1 GENERALIZED ANXIETY DISORDER: Primary | ICD-10-CM

## 2023-11-27 PROCEDURE — 3078F DIAST BP <80 MM HG: CPT | Performed by: REGISTERED NURSE

## 2023-11-27 PROCEDURE — 99214 OFFICE O/P EST MOD 30 MIN: CPT | Performed by: REGISTERED NURSE

## 2023-11-27 PROCEDURE — 3075F SYST BP GE 130 - 139MM HG: CPT | Performed by: REGISTERED NURSE

## 2023-11-27 PROCEDURE — 1160F RVW MEDS BY RX/DR IN RCRD: CPT | Performed by: REGISTERED NURSE

## 2023-11-27 PROCEDURE — 1159F MED LIST DOCD IN RCRD: CPT | Performed by: REGISTERED NURSE

## 2023-11-27 RX ORDER — SERTRALINE HYDROCHLORIDE 25 MG/1
TABLET, FILM COATED ORAL
Qty: 30 TABLET | Refills: 0 | Status: SHIPPED | OUTPATIENT
Start: 2023-11-27

## 2023-11-27 RX ORDER — HYDROXYZINE HYDROCHLORIDE 10 MG/1
10 TABLET, FILM COATED ORAL 2 TIMES DAILY PRN
Qty: 60 TABLET | Refills: 1 | Status: SHIPPED | OUTPATIENT
Start: 2023-11-27

## 2023-12-19 ENCOUNTER — OFFICE VISIT (OUTPATIENT)
Dept: BEHAVIORAL HEALTH | Facility: CLINIC | Age: 78
End: 2023-12-19
Payer: MEDICARE

## 2023-12-19 DIAGNOSIS — F41.1 GENERALIZED ANXIETY DISORDER: Primary | ICD-10-CM

## 2023-12-19 DIAGNOSIS — F33.1 MAJOR DEPRESSIVE DISORDER, RECURRENT EPISODE, MODERATE: ICD-10-CM

## 2024-01-25 ENCOUNTER — OFFICE VISIT (OUTPATIENT)
Dept: BEHAVIORAL HEALTH | Facility: CLINIC | Age: 79
End: 2024-01-25
Payer: MEDICARE

## 2024-01-25 VITALS
OXYGEN SATURATION: 96 % | SYSTOLIC BLOOD PRESSURE: 128 MMHG | HEIGHT: 70 IN | WEIGHT: 145 LBS | DIASTOLIC BLOOD PRESSURE: 74 MMHG | BODY MASS INDEX: 20.76 KG/M2 | HEART RATE: 54 BPM

## 2024-01-25 DIAGNOSIS — F41.1 GENERALIZED ANXIETY DISORDER: Primary | ICD-10-CM

## 2024-01-30 ENCOUNTER — OFFICE VISIT (OUTPATIENT)
Dept: BEHAVIORAL HEALTH | Facility: CLINIC | Age: 79
End: 2024-01-30
Payer: MEDICARE

## 2024-01-30 DIAGNOSIS — F33.1 MAJOR DEPRESSIVE DISORDER, RECURRENT EPISODE, MODERATE: ICD-10-CM

## 2024-01-30 DIAGNOSIS — F41.1 GENERALIZED ANXIETY DISORDER: Primary | ICD-10-CM

## 2024-02-13 DIAGNOSIS — E78.5 HYPERLIPIDEMIA, UNSPECIFIED HYPERLIPIDEMIA TYPE: ICD-10-CM

## 2024-02-13 RX ORDER — ATORVASTATIN CALCIUM 20 MG/1
20 TABLET, FILM COATED ORAL DAILY
Qty: 90 TABLET | Refills: 0 | Status: SHIPPED | OUTPATIENT
Start: 2024-02-13

## 2024-02-29 DIAGNOSIS — I10 ESSENTIAL HYPERTENSION: ICD-10-CM

## 2024-02-29 RX ORDER — AMLODIPINE BESYLATE 10 MG/1
10 TABLET ORAL DAILY
Qty: 90 TABLET | Refills: 0 | Status: SHIPPED | OUTPATIENT
Start: 2024-02-29

## 2024-03-08 ENCOUNTER — TELEPHONE (OUTPATIENT)
Dept: INTERNAL MEDICINE | Facility: CLINIC | Age: 79
End: 2024-03-08
Payer: MEDICARE

## 2024-03-08 DIAGNOSIS — E78.5 HYPERLIPIDEMIA, UNSPECIFIED HYPERLIPIDEMIA TYPE: ICD-10-CM

## 2024-03-08 RX ORDER — CLOPIDOGREL BISULFATE 75 MG/1
TABLET ORAL
Qty: 90 TABLET | Refills: 0 | Status: SHIPPED | OUTPATIENT
Start: 2024-03-08

## 2024-05-15 ENCOUNTER — OFFICE VISIT (OUTPATIENT)
Dept: INTERNAL MEDICINE | Facility: CLINIC | Age: 79
End: 2024-05-15
Payer: MEDICARE

## 2024-05-15 VITALS
BODY MASS INDEX: 21.19 KG/M2 | WEIGHT: 148 LBS | HEIGHT: 70 IN | DIASTOLIC BLOOD PRESSURE: 70 MMHG | OXYGEN SATURATION: 95 % | SYSTOLIC BLOOD PRESSURE: 132 MMHG | HEART RATE: 53 BPM

## 2024-05-15 DIAGNOSIS — F10.10 ALCOHOL ABUSE: ICD-10-CM

## 2024-05-15 DIAGNOSIS — F33.1 MAJOR DEPRESSIVE DISORDER, RECURRENT, MODERATE: Primary | ICD-10-CM

## 2024-05-15 DIAGNOSIS — R41.3 MEMORY IMPAIRMENT: ICD-10-CM

## 2024-05-15 DIAGNOSIS — R26.89 SHUFFLING GAIT: ICD-10-CM

## 2024-05-15 PROBLEM — H11.30 SUBCONJUNCTIVAL HEMORRHAGE: Status: RESOLVED | Noted: 2021-06-30 | Resolved: 2024-05-15

## 2024-05-15 PROBLEM — K59.09 OTHER CONSTIPATION: Status: RESOLVED | Noted: 2019-02-04 | Resolved: 2024-05-15

## 2024-05-15 PROBLEM — S22.39XA RIB FRACTURE: Status: RESOLVED | Noted: 2021-06-30 | Resolved: 2024-05-15

## 2024-05-15 PROBLEM — S01.91XA LACERATION OF HEAD: Status: RESOLVED | Noted: 2021-06-30 | Resolved: 2024-05-15

## 2024-05-15 PROBLEM — K58.2 IRRITABLE BOWEL SYNDROME WITH BOTH CONSTIPATION AND DIARRHEA: Status: ACTIVE | Noted: 2024-05-15

## 2024-05-15 PROCEDURE — 99214 OFFICE O/P EST MOD 30 MIN: CPT | Performed by: FAMILY MEDICINE

## 2024-05-15 PROCEDURE — 1160F RVW MEDS BY RX/DR IN RCRD: CPT | Performed by: FAMILY MEDICINE

## 2024-05-15 PROCEDURE — 1126F AMNT PAIN NOTED NONE PRSNT: CPT | Performed by: FAMILY MEDICINE

## 2024-05-15 PROCEDURE — 3078F DIAST BP <80 MM HG: CPT | Performed by: FAMILY MEDICINE

## 2024-05-15 PROCEDURE — 3075F SYST BP GE 130 - 139MM HG: CPT | Performed by: FAMILY MEDICINE

## 2024-05-15 PROCEDURE — 1159F MED LIST DOCD IN RCRD: CPT | Performed by: FAMILY MEDICINE

## 2024-05-15 RX ORDER — SOLIFENACIN SUCCINATE 10 MG/1
1 TABLET, FILM COATED ORAL DAILY
COMMUNITY
Start: 2024-03-07

## 2024-05-17 ENCOUNTER — LAB (OUTPATIENT)
Dept: LAB | Facility: HOSPITAL | Age: 79
End: 2024-05-17
Payer: MEDICARE

## 2024-05-17 DIAGNOSIS — R41.3 MEMORY IMPAIRMENT: ICD-10-CM

## 2024-05-17 DIAGNOSIS — F33.1 MAJOR DEPRESSIVE DISORDER, RECURRENT, MODERATE: ICD-10-CM

## 2024-05-17 DIAGNOSIS — F10.10 ALCOHOL ABUSE: ICD-10-CM

## 2024-05-17 LAB
BASOPHILS # BLD AUTO: 0.04 10*3/MM3 (ref 0–0.2)
BASOPHILS NFR BLD AUTO: 0.7 % (ref 0–1.5)
DEPRECATED RDW RBC AUTO: 40.4 FL (ref 37–54)
EOSINOPHIL # BLD AUTO: 0.05 10*3/MM3 (ref 0–0.4)
EOSINOPHIL NFR BLD AUTO: 0.9 % (ref 0.3–6.2)
ERYTHROCYTE [DISTWIDTH] IN BLOOD BY AUTOMATED COUNT: 12.6 % (ref 12.3–15.4)
HCT VFR BLD AUTO: 40.1 % (ref 37.5–51)
HGB BLD-MCNC: 13.9 G/DL (ref 13–17.7)
IMM GRANULOCYTES # BLD AUTO: 0.01 10*3/MM3 (ref 0–0.05)
IMM GRANULOCYTES NFR BLD AUTO: 0.2 % (ref 0–0.5)
LYMPHOCYTES # BLD AUTO: 1.2 10*3/MM3 (ref 0.7–3.1)
LYMPHOCYTES NFR BLD AUTO: 21.8 % (ref 19.6–45.3)
MCH RBC QN AUTO: 31 PG (ref 26.6–33)
MCHC RBC AUTO-ENTMCNC: 34.7 G/DL (ref 31.5–35.7)
MCV RBC AUTO: 89.3 FL (ref 79–97)
MONOCYTES # BLD AUTO: 0.45 10*3/MM3 (ref 0.1–0.9)
MONOCYTES NFR BLD AUTO: 8.2 % (ref 5–12)
NEUTROPHILS NFR BLD AUTO: 3.76 10*3/MM3 (ref 1.7–7)
NEUTROPHILS NFR BLD AUTO: 68.2 % (ref 42.7–76)
NRBC BLD AUTO-RTO: 0 /100 WBC (ref 0–0.2)
PLATELET # BLD AUTO: 257 10*3/MM3 (ref 140–450)
PMV BLD AUTO: 9.2 FL (ref 6–12)
RBC # BLD AUTO: 4.49 10*6/MM3 (ref 4.14–5.8)
WBC NRBC COR # BLD AUTO: 5.51 10*3/MM3 (ref 3.4–10.8)

## 2024-05-17 PROCEDURE — 85025 COMPLETE CBC W/AUTO DIFF WBC: CPT

## 2024-05-17 PROCEDURE — 80053 COMPREHEN METABOLIC PANEL: CPT

## 2024-05-18 DIAGNOSIS — I10 ESSENTIAL HYPERTENSION: ICD-10-CM

## 2024-05-18 DIAGNOSIS — E78.5 HYPERLIPIDEMIA, UNSPECIFIED HYPERLIPIDEMIA TYPE: ICD-10-CM

## 2024-05-18 LAB
ALBUMIN SERPL-MCNC: 4.2 G/DL (ref 3.5–5.2)
ALBUMIN/GLOB SERPL: 1.8 G/DL
ALP SERPL-CCNC: 57 U/L (ref 39–117)
ALT SERPL W P-5'-P-CCNC: 38 U/L (ref 1–41)
ANION GAP SERPL CALCULATED.3IONS-SCNC: 14.1 MMOL/L (ref 5–15)
AST SERPL-CCNC: 23 U/L (ref 1–40)
BILIRUB SERPL-MCNC: 0.6 MG/DL (ref 0–1.2)
BUN SERPL-MCNC: 5 MG/DL (ref 8–23)
BUN/CREAT SERPL: 7.7 (ref 7–25)
CALCIUM SPEC-SCNC: 9.1 MG/DL (ref 8.6–10.5)
CHLORIDE SERPL-SCNC: 94 MMOL/L (ref 98–107)
CO2 SERPL-SCNC: 24.9 MMOL/L (ref 22–29)
CREAT SERPL-MCNC: 0.65 MG/DL (ref 0.76–1.27)
EGFRCR SERPLBLD CKD-EPI 2021: 97 ML/MIN/1.73
GLOBULIN UR ELPH-MCNC: 2.3 GM/DL
GLUCOSE SERPL-MCNC: 97 MG/DL (ref 65–99)
POTASSIUM SERPL-SCNC: 4 MMOL/L (ref 3.5–5.2)
PROT SERPL-MCNC: 6.5 G/DL (ref 6–8.5)
SODIUM SERPL-SCNC: 133 MMOL/L (ref 136–145)

## 2024-05-19 RX ORDER — AMLODIPINE BESYLATE 10 MG/1
10 TABLET ORAL DAILY
Qty: 90 TABLET | Refills: 0 | Status: SHIPPED | OUTPATIENT
Start: 2024-05-19

## 2024-05-19 RX ORDER — ATORVASTATIN CALCIUM 20 MG/1
20 TABLET, FILM COATED ORAL DAILY
Qty: 90 TABLET | Refills: 0 | Status: SHIPPED | OUTPATIENT
Start: 2024-05-19

## 2024-05-20 ENCOUNTER — TELEPHONE (OUTPATIENT)
Dept: INTERNAL MEDICINE | Facility: CLINIC | Age: 79
End: 2024-05-20
Payer: MEDICARE

## 2024-05-28 DIAGNOSIS — E78.5 HYPERLIPIDEMIA, UNSPECIFIED HYPERLIPIDEMIA TYPE: ICD-10-CM

## 2024-05-28 RX ORDER — CLOPIDOGREL BISULFATE 75 MG/1
TABLET ORAL
Qty: 90 TABLET | Refills: 0 | OUTPATIENT
Start: 2024-05-28

## 2024-05-30 DIAGNOSIS — E78.5 HYPERLIPIDEMIA, UNSPECIFIED HYPERLIPIDEMIA TYPE: ICD-10-CM

## 2024-06-05 RX ORDER — CLOPIDOGREL BISULFATE 75 MG/1
TABLET ORAL
Qty: 90 TABLET | Refills: 0 | Status: SHIPPED | OUTPATIENT
Start: 2024-06-05

## 2024-06-14 ENCOUNTER — HOSPITAL ENCOUNTER (OUTPATIENT)
Dept: MRI IMAGING | Facility: HOSPITAL | Age: 79
Discharge: HOME | End: 2024-06-14
Payer: MEDICARE

## 2024-06-14 DIAGNOSIS — F10.10 ALCOHOL ABUSE: ICD-10-CM

## 2024-06-14 DIAGNOSIS — R41.3 MEMORY IMPAIRMENT: ICD-10-CM

## 2024-06-14 DIAGNOSIS — R26.89 SHUFFLING GAIT: ICD-10-CM

## 2024-06-14 DIAGNOSIS — F33.1 MAJOR DEPRESSIVE DISORDER, RECURRENT, MODERATE: ICD-10-CM

## 2024-06-14 PROCEDURE — 70553 MRI BRAIN STEM W/O & W/DYE: CPT

## 2024-06-14 PROCEDURE — A9577 INJ MULTIHANCE: HCPCS | Performed by: FAMILY MEDICINE

## 2024-06-14 PROCEDURE — 0 GADOBENATE DIMEGLUMINE 529 MG/ML SOLUTION: Performed by: FAMILY MEDICINE

## 2024-06-14 RX ADMIN — GADOBENATE DIMEGLUMINE 13 ML: 529 INJECTION, SOLUTION INTRAVENOUS at 10:27

## 2024-06-17 ENCOUNTER — OFFICE VISIT (OUTPATIENT)
Dept: INTERNAL MEDICINE | Facility: CLINIC | Age: 79
End: 2024-06-17
Payer: MEDICARE

## 2024-06-17 VITALS
RESPIRATION RATE: 16 BRPM | DIASTOLIC BLOOD PRESSURE: 66 MMHG | HEART RATE: 55 BPM | WEIGHT: 145 LBS | BODY MASS INDEX: 20.76 KG/M2 | HEIGHT: 70 IN | SYSTOLIC BLOOD PRESSURE: 122 MMHG | OXYGEN SATURATION: 98 %

## 2024-06-17 DIAGNOSIS — E87.1 HYPONATREMIA: ICD-10-CM

## 2024-06-17 DIAGNOSIS — R42 DIZZINESS: ICD-10-CM

## 2024-06-17 DIAGNOSIS — F41.1 GENERALIZED ANXIETY DISORDER: ICD-10-CM

## 2024-06-17 DIAGNOSIS — F33.1 MAJOR DEPRESSIVE DISORDER, RECURRENT, MODERATE: Primary | ICD-10-CM

## 2024-06-17 PROCEDURE — 3074F SYST BP LT 130 MM HG: CPT | Performed by: FAMILY MEDICINE

## 2024-06-17 PROCEDURE — 99214 OFFICE O/P EST MOD 30 MIN: CPT | Performed by: FAMILY MEDICINE

## 2024-06-17 PROCEDURE — 1159F MED LIST DOCD IN RCRD: CPT | Performed by: FAMILY MEDICINE

## 2024-06-17 PROCEDURE — 3078F DIAST BP <80 MM HG: CPT | Performed by: FAMILY MEDICINE

## 2024-06-17 PROCEDURE — 1126F AMNT PAIN NOTED NONE PRSNT: CPT | Performed by: FAMILY MEDICINE

## 2024-06-17 PROCEDURE — 1160F RVW MEDS BY RX/DR IN RCRD: CPT | Performed by: FAMILY MEDICINE

## 2024-06-17 RX ORDER — SERTRALINE HYDROCHLORIDE 25 MG/1
25 TABLET, FILM COATED ORAL DAILY
Qty: 30 TABLET | Refills: 1 | Status: SHIPPED | OUTPATIENT
Start: 2024-06-17

## 2024-08-16 ENCOUNTER — OFFICE VISIT (OUTPATIENT)
Dept: INTERNAL MEDICINE | Facility: CLINIC | Age: 79
End: 2024-08-16
Payer: MEDICARE

## 2024-08-16 VITALS
BODY MASS INDEX: 19.47 KG/M2 | HEIGHT: 70 IN | SYSTOLIC BLOOD PRESSURE: 104 MMHG | DIASTOLIC BLOOD PRESSURE: 62 MMHG | RESPIRATION RATE: 16 BRPM | HEART RATE: 64 BPM | WEIGHT: 136 LBS | OXYGEN SATURATION: 98 %

## 2024-08-16 DIAGNOSIS — F41.1 GENERALIZED ANXIETY DISORDER: ICD-10-CM

## 2024-08-16 DIAGNOSIS — F10.232 ALCOHOL DEPENDENCE WITH WITHDRAWAL WITH PERCEPTUAL DISTURBANCE: Primary | ICD-10-CM

## 2024-08-16 DIAGNOSIS — F10.10 ALCOHOL ABUSE: ICD-10-CM

## 2024-08-16 PROCEDURE — 1160F RVW MEDS BY RX/DR IN RCRD: CPT | Performed by: FAMILY MEDICINE

## 2024-08-16 PROCEDURE — 1159F MED LIST DOCD IN RCRD: CPT | Performed by: FAMILY MEDICINE

## 2024-08-16 PROCEDURE — 1126F AMNT PAIN NOTED NONE PRSNT: CPT | Performed by: FAMILY MEDICINE

## 2024-08-16 PROCEDURE — 3074F SYST BP LT 130 MM HG: CPT | Performed by: FAMILY MEDICINE

## 2024-08-16 PROCEDURE — 99214 OFFICE O/P EST MOD 30 MIN: CPT | Performed by: FAMILY MEDICINE

## 2024-08-16 PROCEDURE — 3078F DIAST BP <80 MM HG: CPT | Performed by: FAMILY MEDICINE

## 2024-08-26 ENCOUNTER — OFFICE VISIT (OUTPATIENT)
Dept: INTERNAL MEDICINE | Facility: CLINIC | Age: 79
End: 2024-08-26
Payer: MEDICARE

## 2024-08-26 VITALS
WEIGHT: 135 LBS | OXYGEN SATURATION: 99 % | SYSTOLIC BLOOD PRESSURE: 122 MMHG | HEIGHT: 70 IN | RESPIRATION RATE: 14 BRPM | HEART RATE: 60 BPM | BODY MASS INDEX: 19.33 KG/M2 | DIASTOLIC BLOOD PRESSURE: 70 MMHG

## 2024-08-26 DIAGNOSIS — F10.280 ALCOHOL DEPENDENCE WITH ALCOHOL-INDUCED ANXIETY DISORDER: Primary | ICD-10-CM

## 2024-08-26 PROCEDURE — 99214 OFFICE O/P EST MOD 30 MIN: CPT | Performed by: FAMILY MEDICINE

## 2024-08-26 PROCEDURE — 1160F RVW MEDS BY RX/DR IN RCRD: CPT | Performed by: FAMILY MEDICINE

## 2024-08-26 PROCEDURE — 1126F AMNT PAIN NOTED NONE PRSNT: CPT | Performed by: FAMILY MEDICINE

## 2024-08-26 PROCEDURE — 1159F MED LIST DOCD IN RCRD: CPT | Performed by: FAMILY MEDICINE

## 2024-08-26 PROCEDURE — 3078F DIAST BP <80 MM HG: CPT | Performed by: FAMILY MEDICINE

## 2024-08-26 PROCEDURE — 3074F SYST BP LT 130 MM HG: CPT | Performed by: FAMILY MEDICINE

## 2024-08-26 RX ORDER — QUETIAPINE FUMARATE 50 MG/1
50 TABLET, FILM COATED ORAL NIGHTLY
Qty: 90 TABLET | Refills: 0 | Status: SHIPPED | OUTPATIENT
Start: 2024-08-26

## 2024-08-29 DIAGNOSIS — E78.5 HYPERLIPIDEMIA, UNSPECIFIED HYPERLIPIDEMIA TYPE: ICD-10-CM

## 2024-08-29 DIAGNOSIS — I10 ESSENTIAL HYPERTENSION: ICD-10-CM

## 2024-08-29 RX ORDER — AMLODIPINE BESYLATE 10 MG/1
10 TABLET ORAL DAILY
Qty: 90 TABLET | Refills: 0 | Status: SHIPPED | OUTPATIENT
Start: 2024-08-29

## 2024-08-29 RX ORDER — ATORVASTATIN CALCIUM 20 MG/1
20 TABLET, FILM COATED ORAL DAILY
Qty: 90 TABLET | Refills: 0 | Status: SHIPPED | OUTPATIENT
Start: 2024-08-29

## 2024-08-31 DIAGNOSIS — E78.5 HYPERLIPIDEMIA, UNSPECIFIED HYPERLIPIDEMIA TYPE: ICD-10-CM

## 2024-09-03 RX ORDER — CLOPIDOGREL BISULFATE 75 MG/1
TABLET ORAL
Qty: 90 TABLET | Refills: 0 | Status: SHIPPED | OUTPATIENT
Start: 2024-09-03

## 2024-09-11 ENCOUNTER — OFFICE VISIT (OUTPATIENT)
Dept: NEUROLOGY | Facility: CLINIC | Age: 79
End: 2024-09-11
Payer: MEDICARE

## 2024-09-11 VITALS
HEIGHT: 70 IN | DIASTOLIC BLOOD PRESSURE: 76 MMHG | WEIGHT: 135 LBS | OXYGEN SATURATION: 99 % | BODY MASS INDEX: 19.33 KG/M2 | SYSTOLIC BLOOD PRESSURE: 138 MMHG | HEART RATE: 57 BPM

## 2024-09-11 DIAGNOSIS — R26.89 BALANCE DISORDER: ICD-10-CM

## 2024-09-11 DIAGNOSIS — G31.84 MILD COGNITIVE IMPAIRMENT: Primary | ICD-10-CM

## 2024-09-11 RX ORDER — DONEPEZIL HYDROCHLORIDE 5 MG/1
5 TABLET, FILM COATED ORAL NIGHTLY
Qty: 30 TABLET | Refills: 6 | Status: SHIPPED | OUTPATIENT
Start: 2024-09-11 | End: 2025-09-11

## 2024-09-23 ENCOUNTER — HOSPITAL ENCOUNTER (OUTPATIENT)
Dept: PHYSICAL THERAPY | Facility: HOSPITAL | Age: 79
Setting detail: THERAPIES SERIES
Discharge: HOME | End: 2024-09-23
Payer: MEDICARE

## 2024-09-23 DIAGNOSIS — R26.89 BALANCE PROBLEM: Primary | ICD-10-CM

## 2024-10-02 ENCOUNTER — HOSPITAL ENCOUNTER (OUTPATIENT)
Dept: PHYSICAL THERAPY | Facility: HOSPITAL | Age: 79
Setting detail: THERAPIES SERIES
Discharge: HOME | End: 2024-10-02
Payer: MEDICARE

## 2024-10-02 DIAGNOSIS — R26.89 BALANCE PROBLEM: Primary | ICD-10-CM

## 2024-10-02 PROCEDURE — 97110 THERAPEUTIC EXERCISES: CPT | Performed by: PHYSICAL THERAPIST

## 2024-10-02 PROCEDURE — 97112 NEUROMUSCULAR REEDUCATION: CPT | Performed by: PHYSICAL THERAPIST

## 2024-10-09 ENCOUNTER — OFFICE VISIT (OUTPATIENT)
Dept: INTERNAL MEDICINE | Facility: CLINIC | Age: 79
End: 2024-10-09
Payer: MEDICARE

## 2024-10-09 VITALS
HEIGHT: 70 IN | WEIGHT: 149.8 LBS | DIASTOLIC BLOOD PRESSURE: 84 MMHG | HEART RATE: 52 BPM | BODY MASS INDEX: 21.45 KG/M2 | OXYGEN SATURATION: 98 % | SYSTOLIC BLOOD PRESSURE: 126 MMHG

## 2024-10-09 DIAGNOSIS — Z00.00 MEDICARE ANNUAL WELLNESS VISIT, SUBSEQUENT: Primary | ICD-10-CM

## 2024-10-09 DIAGNOSIS — R79.9 ABNORMAL FINDING OF BLOOD CHEMISTRY, UNSPECIFIED: ICD-10-CM

## 2024-10-09 DIAGNOSIS — Z12.5 SCREENING PSA (PROSTATE SPECIFIC ANTIGEN): ICD-10-CM

## 2024-10-09 DIAGNOSIS — R05.8 DRY COUGH: ICD-10-CM

## 2024-10-09 DIAGNOSIS — I10 ESSENTIAL HYPERTENSION: ICD-10-CM

## 2024-10-09 DIAGNOSIS — F10.280 ALCOHOL DEPENDENCE WITH ALCOHOL-INDUCED ANXIETY DISORDER: ICD-10-CM

## 2024-10-09 DIAGNOSIS — Z23 NEED FOR COVID-19 VACCINE: ICD-10-CM

## 2024-10-09 DIAGNOSIS — E78.5 HYPERLIPIDEMIA, UNSPECIFIED HYPERLIPIDEMIA TYPE: ICD-10-CM

## 2024-10-09 DIAGNOSIS — Z23 NEED FOR INFLUENZA VACCINATION: ICD-10-CM

## 2024-10-09 DIAGNOSIS — R09.82 POST-NASAL DRIP: ICD-10-CM

## 2024-10-09 DIAGNOSIS — M51.360 DEGENERATION OF INTERVERTEBRAL DISC OF LUMBAR REGION WITH DISCOGENIC BACK PAIN: ICD-10-CM

## 2024-10-09 PROCEDURE — 3074F SYST BP LT 130 MM HG: CPT | Performed by: FAMILY MEDICINE

## 2024-10-09 PROCEDURE — G0439 PPPS, SUBSEQ VISIT: HCPCS | Performed by: FAMILY MEDICINE

## 2024-10-09 PROCEDURE — 1159F MED LIST DOCD IN RCRD: CPT | Performed by: FAMILY MEDICINE

## 2024-10-09 PROCEDURE — 99213 OFFICE O/P EST LOW 20 MIN: CPT | Performed by: FAMILY MEDICINE

## 2024-10-09 PROCEDURE — 91320 SARSCV2 VAC 30MCG TRS-SUC IM: CPT | Performed by: FAMILY MEDICINE

## 2024-10-09 PROCEDURE — 1160F RVW MEDS BY RX/DR IN RCRD: CPT | Performed by: FAMILY MEDICINE

## 2024-10-09 PROCEDURE — G0008 ADMIN INFLUENZA VIRUS VAC: HCPCS | Performed by: FAMILY MEDICINE

## 2024-10-09 PROCEDURE — 90480 ADMN SARSCOV2 VAC 1/ONLY CMP: CPT | Performed by: FAMILY MEDICINE

## 2024-10-09 PROCEDURE — 90662 IIV NO PRSV INCREASED AG IM: CPT | Performed by: FAMILY MEDICINE

## 2024-10-09 PROCEDURE — 1170F FXNL STATUS ASSESSED: CPT | Performed by: FAMILY MEDICINE

## 2024-10-09 PROCEDURE — 3079F DIAST BP 80-89 MM HG: CPT | Performed by: FAMILY MEDICINE

## 2024-10-09 PROCEDURE — 1125F AMNT PAIN NOTED PAIN PRSNT: CPT | Performed by: FAMILY MEDICINE

## 2024-10-09 RX ORDER — CLOPIDOGREL BISULFATE 75 MG/1
TABLET ORAL
Qty: 90 TABLET | Refills: 1 | Status: SHIPPED | OUTPATIENT
Start: 2024-10-09

## 2024-10-09 RX ORDER — AMLODIPINE BESYLATE 10 MG/1
10 TABLET ORAL DAILY
Qty: 90 TABLET | Refills: 1 | Status: SHIPPED | OUTPATIENT
Start: 2024-10-09

## 2024-10-09 RX ORDER — METOPROLOL SUCCINATE 100 MG/1
200 TABLET, EXTENDED RELEASE ORAL DAILY
Qty: 180 TABLET | Refills: 1 | Status: SHIPPED | OUTPATIENT
Start: 2024-10-09

## 2024-10-09 RX ORDER — QUETIAPINE FUMARATE 50 MG/1
50 TABLET, FILM COATED ORAL NIGHTLY
Qty: 90 TABLET | Refills: 1 | Status: SHIPPED | OUTPATIENT
Start: 2024-10-09

## 2024-10-09 RX ORDER — BENZONATATE 200 MG/1
200 CAPSULE ORAL 3 TIMES DAILY PRN
Qty: 30 CAPSULE | Refills: 0 | Status: SHIPPED | OUTPATIENT
Start: 2024-10-09 | End: 2024-10-16

## 2024-10-09 RX ORDER — ATORVASTATIN CALCIUM 20 MG/1
20 TABLET, FILM COATED ORAL DAILY
Qty: 90 TABLET | Refills: 1 | Status: SHIPPED | OUTPATIENT
Start: 2024-10-09

## 2024-10-10 ENCOUNTER — HOSPITAL ENCOUNTER (OUTPATIENT)
Dept: PHYSICAL THERAPY | Facility: HOSPITAL | Age: 79
Setting detail: THERAPIES SERIES
Discharge: HOME | End: 2024-10-10
Payer: MEDICARE

## 2024-10-10 DIAGNOSIS — R26.89 BALANCE PROBLEM: Primary | ICD-10-CM

## 2024-10-10 PROCEDURE — 97110 THERAPEUTIC EXERCISES: CPT | Performed by: PHYSICAL THERAPIST

## 2024-10-10 PROCEDURE — 97112 NEUROMUSCULAR REEDUCATION: CPT | Performed by: PHYSICAL THERAPIST

## 2024-10-18 ENCOUNTER — HOSPITAL ENCOUNTER (OUTPATIENT)
Dept: PHYSICAL THERAPY | Facility: HOSPITAL | Age: 79
Setting detail: THERAPIES SERIES
Discharge: HOME | End: 2024-10-18
Payer: MEDICARE

## 2024-10-18 DIAGNOSIS — R26.89 BALANCE PROBLEM: Primary | ICD-10-CM

## 2024-10-18 PROCEDURE — 97110 THERAPEUTIC EXERCISES: CPT | Performed by: PHYSICAL THERAPIST

## 2024-10-18 PROCEDURE — 97112 NEUROMUSCULAR REEDUCATION: CPT | Performed by: PHYSICAL THERAPIST

## 2024-10-21 ENCOUNTER — OFFICE VISIT (OUTPATIENT)
Dept: BEHAVIORAL HEALTH | Facility: CLINIC | Age: 79
End: 2024-10-21
Payer: MEDICARE

## 2024-10-21 VITALS
DIASTOLIC BLOOD PRESSURE: 78 MMHG | BODY MASS INDEX: 22.19 KG/M2 | SYSTOLIC BLOOD PRESSURE: 130 MMHG | OXYGEN SATURATION: 98 % | WEIGHT: 155 LBS | HEIGHT: 70 IN | HEART RATE: 54 BPM

## 2024-10-21 DIAGNOSIS — F33.1 MAJOR DEPRESSIVE DISORDER, RECURRENT EPISODE, MODERATE: Primary | ICD-10-CM

## 2024-10-21 DIAGNOSIS — F41.1 GENERALIZED ANXIETY DISORDER: ICD-10-CM

## 2024-10-21 DIAGNOSIS — F10.10 ALCOHOL ABUSE: ICD-10-CM

## 2024-10-21 PROCEDURE — 96127 BRIEF EMOTIONAL/BEHAV ASSMT: CPT | Performed by: REGISTERED NURSE

## 2024-10-21 PROCEDURE — 99214 OFFICE O/P EST MOD 30 MIN: CPT | Performed by: REGISTERED NURSE

## 2024-10-21 PROCEDURE — 1160F RVW MEDS BY RX/DR IN RCRD: CPT | Performed by: REGISTERED NURSE

## 2024-10-21 PROCEDURE — 3078F DIAST BP <80 MM HG: CPT | Performed by: REGISTERED NURSE

## 2024-10-21 PROCEDURE — 3075F SYST BP GE 130 - 139MM HG: CPT | Performed by: REGISTERED NURSE

## 2024-10-21 PROCEDURE — 1159F MED LIST DOCD IN RCRD: CPT | Performed by: REGISTERED NURSE

## 2024-10-22 ENCOUNTER — LAB (OUTPATIENT)
Dept: LAB | Facility: HOSPITAL | Age: 79
End: 2024-10-22
Payer: MEDICARE

## 2024-10-22 DIAGNOSIS — E78.5 HYPERLIPIDEMIA, UNSPECIFIED HYPERLIPIDEMIA TYPE: ICD-10-CM

## 2024-10-22 DIAGNOSIS — Z12.5 SCREENING PSA (PROSTATE SPECIFIC ANTIGEN): ICD-10-CM

## 2024-10-22 DIAGNOSIS — I10 ESSENTIAL HYPERTENSION: ICD-10-CM

## 2024-10-22 DIAGNOSIS — R79.9 ABNORMAL FINDING OF BLOOD CHEMISTRY, UNSPECIFIED: ICD-10-CM

## 2024-10-22 LAB
ALBUMIN SERPL-MCNC: 4.3 G/DL (ref 3.5–5.2)
ALBUMIN/GLOB SERPL: 2 G/DL
ALP SERPL-CCNC: 44 U/L (ref 39–117)
ALT SERPL W P-5'-P-CCNC: 17 U/L (ref 1–41)
ANION GAP SERPL CALCULATED.3IONS-SCNC: 8.6 MMOL/L (ref 5–15)
AST SERPL-CCNC: 15 U/L (ref 1–40)
BASOPHILS # BLD AUTO: 0.07 10*3/MM3 (ref 0–0.2)
BASOPHILS NFR BLD AUTO: 1.5 % (ref 0–1.5)
BILIRUB SERPL-MCNC: 0.4 MG/DL (ref 0–1.2)
BUN SERPL-MCNC: 13 MG/DL (ref 8–23)
BUN/CREAT SERPL: 15.3 (ref 7–25)
CALCIUM SPEC-SCNC: 9 MG/DL (ref 8.6–10.5)
CHLORIDE SERPL-SCNC: 96 MMOL/L (ref 98–107)
CHOLEST SERPL-MCNC: 145 MG/DL (ref 0–200)
CO2 SERPL-SCNC: 27.4 MMOL/L (ref 22–29)
CREAT SERPL-MCNC: 0.85 MG/DL (ref 0.76–1.27)
DEPRECATED RDW RBC AUTO: 42.4 FL (ref 37–54)
EGFRCR SERPLBLD CKD-EPI 2021: 89.5 ML/MIN/1.73
EOSINOPHIL # BLD AUTO: 0.1 10*3/MM3 (ref 0–0.4)
EOSINOPHIL NFR BLD AUTO: 2.2 % (ref 0.3–6.2)
ERYTHROCYTE [DISTWIDTH] IN BLOOD BY AUTOMATED COUNT: 12.4 % (ref 12.3–15.4)
GLOBULIN UR ELPH-MCNC: 2.1 GM/DL
GLUCOSE SERPL-MCNC: 100 MG/DL (ref 65–99)
HBA1C MFR BLD: 5.1 % (ref 4.8–5.6)
HCT VFR BLD AUTO: 35.7 % (ref 37.5–51)
HDLC SERPL-MCNC: 70 MG/DL (ref 40–60)
HGB BLD-MCNC: 11.8 G/DL (ref 13–17.7)
IMM GRANULOCYTES # BLD AUTO: 0.01 10*3/MM3 (ref 0–0.05)
IMM GRANULOCYTES NFR BLD AUTO: 0.2 % (ref 0–0.5)
LDLC SERPL CALC-MCNC: 64 MG/DL (ref 0–100)
LDLC/HDLC SERPL: 0.93 {RATIO}
LYMPHOCYTES # BLD AUTO: 1.05 10*3/MM3 (ref 0.7–3.1)
LYMPHOCYTES NFR BLD AUTO: 23.2 % (ref 19.6–45.3)
MCH RBC QN AUTO: 30.9 PG (ref 26.6–33)
MCHC RBC AUTO-ENTMCNC: 33.1 G/DL (ref 31.5–35.7)
MCV RBC AUTO: 93.5 FL (ref 79–97)
MONOCYTES # BLD AUTO: 0.48 10*3/MM3 (ref 0.1–0.9)
MONOCYTES NFR BLD AUTO: 10.6 % (ref 5–12)
NEUTROPHILS NFR BLD AUTO: 2.81 10*3/MM3 (ref 1.7–7)
NEUTROPHILS NFR BLD AUTO: 62.3 % (ref 42.7–76)
NRBC BLD AUTO-RTO: 0 /100 WBC (ref 0–0.2)
PLATELET # BLD AUTO: 250 10*3/MM3 (ref 140–450)
PMV BLD AUTO: 9.3 FL (ref 6–12)
POTASSIUM SERPL-SCNC: 4.4 MMOL/L (ref 3.5–5.2)
PROT SERPL-MCNC: 6.4 G/DL (ref 6–8.5)
PSA SERPL-MCNC: <0.014 NG/ML (ref 0–4)
RBC # BLD AUTO: 3.82 10*6/MM3 (ref 4.14–5.8)
SODIUM SERPL-SCNC: 132 MMOL/L (ref 136–145)
TRIGL SERPL-MCNC: 51 MG/DL (ref 0–150)
TSH SERPL DL<=0.05 MIU/L-ACNC: 1.87 UIU/ML (ref 0.27–4.2)
VLDLC SERPL-MCNC: 11 MG/DL (ref 5–40)
WBC NRBC COR # BLD AUTO: 4.52 10*3/MM3 (ref 3.4–10.8)

## 2024-10-22 PROCEDURE — 85025 COMPLETE CBC W/AUTO DIFF WBC: CPT

## 2024-10-22 PROCEDURE — 80061 LIPID PANEL: CPT

## 2024-10-22 PROCEDURE — G0103 PSA SCREENING: HCPCS

## 2024-10-22 PROCEDURE — 84443 ASSAY THYROID STIM HORMONE: CPT

## 2024-10-22 PROCEDURE — 80053 COMPREHEN METABOLIC PANEL: CPT

## 2024-10-22 PROCEDURE — 83036 HEMOGLOBIN GLYCOSYLATED A1C: CPT

## 2024-10-24 ENCOUNTER — HOSPITAL ENCOUNTER (OUTPATIENT)
Dept: PHYSICAL THERAPY | Facility: HOSPITAL | Age: 79
Setting detail: THERAPIES SERIES
Discharge: HOME | End: 2024-10-24
Payer: MEDICARE

## 2024-10-24 DIAGNOSIS — R26.89 BALANCE PROBLEM: Primary | ICD-10-CM

## 2024-10-24 PROCEDURE — 97116 GAIT TRAINING THERAPY: CPT | Performed by: PHYSICAL THERAPIST

## 2024-10-24 PROCEDURE — 97110 THERAPEUTIC EXERCISES: CPT | Performed by: PHYSICAL THERAPIST

## 2024-10-24 PROCEDURE — 97112 NEUROMUSCULAR REEDUCATION: CPT | Performed by: PHYSICAL THERAPIST

## 2024-10-25 ENCOUNTER — TELEPHONE (OUTPATIENT)
Dept: INTERNAL MEDICINE | Facility: CLINIC | Age: 79
End: 2024-10-25
Payer: MEDICARE

## 2024-10-31 ENCOUNTER — APPOINTMENT (OUTPATIENT)
Dept: PHYSICAL THERAPY | Facility: HOSPITAL | Age: 79
End: 2024-10-31
Payer: MEDICARE

## 2024-11-05 ENCOUNTER — TELEPHONE (OUTPATIENT)
Dept: BEHAVIORAL HEALTH | Facility: CLINIC | Age: 79
End: 2024-11-05
Payer: MEDICARE

## 2024-11-05 DIAGNOSIS — F41.1 GENERALIZED ANXIETY DISORDER: ICD-10-CM

## 2024-11-05 DIAGNOSIS — F33.1 MAJOR DEPRESSIVE DISORDER, RECURRENT EPISODE, MODERATE: Primary | ICD-10-CM

## 2024-11-05 RX ORDER — SERTRALINE HYDROCHLORIDE 100 MG/1
100 TABLET, FILM COATED ORAL DAILY
Qty: 30 TABLET | Refills: 0 | Status: SHIPPED | OUTPATIENT
Start: 2024-11-05

## 2024-11-05 NOTE — TELEPHONE ENCOUNTER
Patient is requesting a Rx for sertraline due to his first prescription lapsing and the Rx dose was changed by Veronica. Pt is asking for a new Rx for 100 mg.    Please contact at [phone]

## 2024-11-07 ENCOUNTER — HOSPITAL ENCOUNTER (OUTPATIENT)
Dept: PHYSICAL THERAPY | Facility: HOSPITAL | Age: 79
Setting detail: THERAPIES SERIES
Discharge: HOME | End: 2024-11-07
Payer: MEDICARE

## 2024-11-07 DIAGNOSIS — R26.89 BALANCE PROBLEM: Primary | ICD-10-CM

## 2024-11-07 PROCEDURE — 97116 GAIT TRAINING THERAPY: CPT | Performed by: PHYSICAL THERAPIST

## 2024-11-07 PROCEDURE — 97112 NEUROMUSCULAR REEDUCATION: CPT | Performed by: PHYSICAL THERAPIST

## 2024-11-07 PROCEDURE — 97110 THERAPEUTIC EXERCISES: CPT | Performed by: PHYSICAL THERAPIST

## 2024-11-07 NOTE — THERAPY TREATMENT NOTE
"Physical Therapy Daily Note      Patient: Ernesto Vann   : 1945  MRN: [id number]   Diagnosis/ICD-10 Code:      ICD-10-CM ICD-9-CM   1. Balance problem  R26.89 781.99      Referring practitioner: Radha Fernandez MD  Today's Date: 2024      Subjective:   Patient reports: \"My wife and I have found this tito that does exercises for older people.  It takes us about 30 minutes a day.  He has us doing some exercises for balance.\"  Pain: 4/10    Objective   See Exercise, Manual, and Modality Logs for complete treatment.    Exercise 1  Exercise Name 1: NuStep B UE/LEs; level 7  Time 1: 8 min  Additional Comments: 39 spm  Exercise 2  Exercise Name 2: Agility ladder with fwd walking one foot in each square and then one foot in every other square; B sidestepping with both feet in ever square and then every other square; fwd straddle stepping along ladder; B sidesteppping with stepping fwd/bwd along ladder, retrostepping along ladder; holding staggered with toss/catch ball and then rotational bounce/catch  Additional Comments: agility ladder; min A; 65 ft x 2 of each  Exercise 3  Exercise Name 3: Ambulation on TM with B and single UE A with VCing to increase B step length and keep upright trunk  Time 3: 6 min  Additional Comments: TM @ 2.0 mph; 0% incline  Exercise 4  Exercise Name 4: Ambualtion on level surface without AD following TM training with pt demonstrating improved B step length, liam and L LE clearance with swing  Reps 4: 300'         PT Neuro          Assessment & Plan       Assessment  Assessment details: Pt requires min A with ambulation along agility ladder with LOB up to 20% of the time.  Pt has difficulty with stepping every other square and has to perform toeing out to increase stability. Pt demonstrates improved ambulation after being on the TM with increased B step length, heel strike and liam. Pt had the most difficulty with stepping fwd with one foot in every square requiring min A. " Pt to benefit from skilled PT services to improve overall functional mobility.      Plan  Plan details: Pt to benefit from skilled PT services to improve gait, balance, strength, and overall functional mobility.              Adrienne Salguero, PT  [place] License #: [id number]    Physical Therapist

## 2024-11-14 ENCOUNTER — HOSPITAL ENCOUNTER (OUTPATIENT)
Dept: PHYSICAL THERAPY | Facility: HOSPITAL | Age: 79
Setting detail: THERAPIES SERIES
Discharge: HOME | End: 2024-11-14
Payer: MEDICARE

## 2024-11-14 DIAGNOSIS — R26.89 BALANCE PROBLEM: Primary | ICD-10-CM

## 2024-11-14 PROCEDURE — 97112 NEUROMUSCULAR REEDUCATION: CPT | Performed by: PHYSICAL THERAPIST

## 2024-11-14 PROCEDURE — 97110 THERAPEUTIC EXERCISES: CPT | Performed by: PHYSICAL THERAPIST

## 2024-11-14 NOTE — THERAPY TREATMENT NOTE
"Physical Therapy Daily Note      Patient: Ernesto Vann   : 1945  MRN: [id number]   Diagnosis/ICD-10 Code:      ICD-10-CM ICD-9-CM   1. Balance problem  R26.89 781.99      Referring practitioner: Radha Fernandez MD  Today's Date: 2024      Subjective:   Patient reports: \"I'm about the same.\"  Pain: 4/10    Objective   See Exercise, Manual, and Modality Logs for complete treatment.    Exercise 1  Exercise Name 1: NuStep B UE/LEs; level 7  Time 1: 6 min  Additional Comments: 47 spm  Exercise 2  Exercise Name 2: Elliptical B UE/LEs; level 5  Time 2: 2 1/2 min  Exercise 3  Exercise Name 3: Walking fwd and B sidestepping along beam; holding staggered with pressing 2# bar overhead; standign sideways with alternating tapping cone in front; holding staggered with LE behind stepping fwd/bwd; B full turns standing on beam  Additional Comments: balance beam; min A with LOB 50% of the time  Exercise 4  Exercise Name 4: Fwd stepping up onto/off rounded side of BOSU with UE A and then standing on rounded side without UE A and maintaining balance  Reps 4: 10  Additional Comments: BOSU; mod A         PT Neuro          Assessment & Plan       Assessment  Assessment details: Pt requires mod A with standing dynamic balance with LOB up to 50% of the time.  Pt has imbalance with standing on rounded side of BOSU and with staggered stance on beam.  Pt reports increased pain and feet and educated pt to call MD about getting on medication for nerve pain.  Pt to benefit from skilled PT services to meet goals.    Plan  Plan details: Pt to continue with PT services to improve gait, balance, strength, and overall functional mobility.              Adrienne Salguero, PT  [place] License #: [id number]    Physical Therapist    "

## 2024-11-21 ENCOUNTER — HOSPITAL ENCOUNTER (OUTPATIENT)
Dept: PHYSICAL THERAPY | Facility: HOSPITAL | Age: 79
Setting detail: THERAPIES SERIES
Discharge: HOME | End: 2024-11-21
Payer: MEDICARE

## 2024-11-21 DIAGNOSIS — R26.89 BALANCE PROBLEM: Primary | ICD-10-CM

## 2024-11-21 PROCEDURE — 97110 THERAPEUTIC EXERCISES: CPT | Performed by: PHYSICAL THERAPIST

## 2024-11-21 PROCEDURE — 97112 NEUROMUSCULAR REEDUCATION: CPT | Performed by: PHYSICAL THERAPIST

## 2024-11-21 NOTE — THERAPY PROGRESS REPORT/RE-CERT
"PT Progress Note        Patient: Ernesto Vann   : 1945  MRN: [id number]   Diagnosis/ICD-10 Code:      ICD-10-CM ICD-9-CM   1. Balance problem  R26.89 781.99      Referring practitioner: Radha Fernandez MD  Today's Date: 2024    Subjective:   Ernesto Vann reports: \"I feel like I can take a break from therapy b/c I'm working out 3x/wk with my wife at home.\"  Subjective Questionnaire: n/a  Clinical Progress: improved  Home Program Compliance: Yes  Treatment has included: therapeutic exercise, neuromuscular re-education, and gait training    Subjective   Objective     Exercise 1  Exercise Name 1: NuStep B UE/LEs; level 7  Time 1: 8 min  Additional Comments: 46 spm  Exercise 2  Exercise Name 2: Re-assessment completed, see for details  Exercise 3  Exercise Name 3: St balance on blue foam with alternating tapping cone in front;attempted st balance on blue foam with across midline tapping with pt unable to perform  Reps 3: 10  Additional Comments: blue foam; two cones; min A  Exercise 4  Exercise Name 4: St balance with single LE tap cone in front/across midline and one to the side  Reps 4: 10  Additional Comments: two cones; min A  Exercise 5  Exercise Name 5: St balance on trampoline with B UE A and jumping up/down, B ski jump and B hip ab/adduction jump; st on trampoline with reg BASSEM and B staggered with EC; B full turns standing on trampoline  Reps 5: 10  Additional Comments: trampoline; min/mod A  Exercise 6  Exercise Name 6: Issued and performed standing gastroc and soleus stretch with pt reporting B calf pain  Reps 6: 30 sec hold x 2      Functional Testing  Outcome Measure Options: 10 Meter Walk, Qureshi Balance, Timed Up and Go (TUG)  TU.95 sec  10 M: 12.67 sec  QURESHI/56    PT Neuro         Assessment & Plan       Assessment  Impairments: abnormal gait, activity intolerance, impaired balance, impaired physical strength and lacks appropriate home exercise program   Functional limitations: " walking   Assessment details: Pt met STG for QURESHI today.  Pt would like to take a break after the remaining visits are utilized secondary to patient reporting that him and his wife have been doing an at home balance/exercise program.  Pt reports that he can keep up with his exercise at home. Pt to benefit from skilled PT services to meet goals.  Prognosis: good    Goals  Plan Goals:  STG (5 visits)  1. Patient to improve QURESHI balance score to >/= 48 /56 to decrease client's risk of falls. MET  2. Patient to perform TUG within 10 sec without LOB for improved functional mobility. ONGOING  3. Patient to ambulate 10 meters without AD within 11 sec without LOB for improved gait liam and functional mobility. ONGOING  4. Patient to improve FGA score to >/= 21/30 to decrease client's risk of falls. ONGOING     LTG (10 visits)  1. Patient to improve QURESHI balance score to >/= 53 /56 to decrease client's risk of falls. ONGOING  2. Patient to perform TUG within 9 sec without LOB for improved functional mobility. ONGOING  3. Patient to ambulate 10 meters without AD within 10 sec without LOB for improved gait liam and functional mobility. ONGOING  4. Patient to improve FGA score to >/= 26 /30 to decrease client's risk of falls. ONGOING  5. Client to ascend/descend 12 steps without HRs and recip pattern without LOB for improved overall functional mobility. ONGOING  6. Patient to be I with HEP. ONGOING      Plan  Therapy options: will be seen for skilled therapy services  Planned therapy interventions: balance/weight-bearing training, abdominal trunk stabilization, flexibility, functional ROM exercises, gait training, home exercise program, neuromuscular re-education, postural training, strengthening, stretching and therapeutic activities  Frequency: 1x week  Duration in visits: 3  Treatment plan discussed with: patient  Plan details: Pt to benefit from skilled PT services to improve gait, balance, strength, and overall  functional mobility.        Progress toward previous goals: Partially Met      Recommendations: Continue as planned  Timeframe: 3 weeks  Prognosis to achieve goals: good    PT Signature: Adrienne Salguero, PT  [place] License #: [id number]    Based upon review of the patient's progress and continued therapy plan, it is my medical opinion that Ernesto Vann should continue physical therapy treatment at [facility] OP PT at [facility].    Signature: __________________________________  Radha Fernandez MD

## 2024-11-25 ENCOUNTER — OFFICE VISIT (OUTPATIENT)
Dept: BEHAVIORAL HEALTH | Facility: CLINIC | Age: 79
End: 2024-11-25
Payer: MEDICARE

## 2024-11-25 VITALS
SYSTOLIC BLOOD PRESSURE: 136 MMHG | DIASTOLIC BLOOD PRESSURE: 84 MMHG | BODY MASS INDEX: 22.22 KG/M2 | HEART RATE: 50 BPM | HEIGHT: 70 IN | WEIGHT: 155.2 LBS | OXYGEN SATURATION: 97 %

## 2024-11-25 DIAGNOSIS — F33.1 MAJOR DEPRESSIVE DISORDER, RECURRENT EPISODE, MODERATE: ICD-10-CM

## 2024-11-25 DIAGNOSIS — F41.1 GENERALIZED ANXIETY DISORDER: ICD-10-CM

## 2024-11-25 RX ORDER — SERTRALINE HYDROCHLORIDE 100 MG/1
100 TABLET, FILM COATED ORAL DAILY
Qty: 90 TABLET | Refills: 0 | Status: SHIPPED | OUTPATIENT
Start: 2024-11-25

## 2024-11-25 NOTE — PROGRESS NOTES
Follow Up Office Visit      Patient Name: Ernesto Vann  : 1945   MRN: [id number]     Referring Provider: Mali Galeana MD    Chief Complaint:      ICD-10-CM ICD-9-CM   1. Major depressive disorder, recurrent episode, moderate  F33.1 296.32   2. Generalized anxiety disorder  F41.1 300.02        History of Present Illness:   Ernesto Vann is a 79 y.o. male who is here today for follow up and medication management    Subjective      Patient Reports:   History of Present Illness  The patient presents for evaluation of multiple medical concerns.    He reports a positive response to the increased dosage of Zoloft, as noted by his wife. He has become more sociable and is beginning to show signs of happiness. His appetite has improved, and he has initiated a home workout routine three times a week with his wife. He is also attending physical therapy sessions to address balance issues. He is no longer seeing [facility] for therapy.    He has started consuming wine during dinner, limiting himself to two glasses per night. His sleep quality has improved with the use of Seroquel, which he takes around 7:30 to 8:00 PM before bedtime. He typically sleeps from 9:00 PM to 6:30 AM and feels well-rested during the day. He reports no suicidal ideation, thoughts of harming others, or experiencing hallucinations.    He is experiencing worsening neuropathy in both ankles, describing a burning sensation in the nerves by the end of the day. He consulted a neurologist a few months ago and plans to schedule another appointment.    Review of Systems:   Review of Systems   Constitutional:  Negative for unexpected weight change.   Eyes:  Negative for visual disturbance.   Respiratory:  Negative for chest tightness and shortness of breath.    Cardiovascular:  Negative for chest pain.   Musculoskeletal:  Negative for gait problem.   Skin:  Negative for rash and wound.   Neurological:  Negative for dizziness, tremors,  seizures, weakness and light-headedness.   Psychiatric/Behavioral:  Positive for dysphoric mood. Negative for agitation, behavioral problems, confusion, hallucinations, self-injury and suicidal ideas. The patient is not nervous/anxious and is not hyperactive.      Sleep pattern: takes seroquel, getting approximately 9.5 hours per night, mostly well rested  Appetite: increased     PHQ-9 Depression Screening  Little interest or pleasure in doing things? Not at all   Feeling down, depressed, or hopeless? Not at all   PHQ-2 Total Score 0   Trouble falling or staying asleep, or sleeping too much? Not at all   Feeling tired or having little energy? Not at all   Poor appetite or overeating? Not at all   Feeling bad about yourself - or that you are a failure or have let yourself or your family down? Not at all   Trouble concentrating on things, such as reading the newspaper or watching television? Not at all   Moving or speaking so slowly that other people could have noticed? Or the opposite - being so fidgety or restless that you have been moving around a lot more than usual? Not at all   Thoughts that you would be better off dead, or of hurting yourself in some way? Not at all   PHQ-9 Total Score 0   If you checked off any problems, how difficult have these problems made it for you to do your work, take care of things at home, or get along with other people? Not difficult at all       CRIS-7 Anxiety Screening  Over the last two weeks, how often have you been bothered by the following problems?  Feeling nervous, anxious or on edge: Not at all  Not being able to stop or control worrying: Not at all  Worrying too much about different things: Not at all  Trouble Relaxing: Not at all  Being so restless that it is hard to sit still: Not at all  Becoming easily annoyed or irritable: Not at all  Feeling afraid as if something awful might happen: Not at all  CRIS 7 Total Score: 0  If you checked any problems, how difficult have  "these problems made it for you to do your work, take care of things at home, or get along with other people: Not difficult at all    RISK ASSESSMENT:  Patient denies any thoughts or intent of suicide today. Patient denies any impulsive behavior today.     Medications:     Current Outpatient Medications:     Acetaminophen 500 MG capsule, Take 2 capsules by mouth Every 6 (Six) Hours., Disp: , Rfl:     amLODIPine (NORVASC) 10 MG tablet, Take 1 tablet by mouth Daily., Disp: 90 tablet, Rfl: 1    atorvastatin (LIPITOR) 20 MG tablet, Take 1 tablet by mouth Daily., Disp: 90 tablet, Rfl: 1    clopidogrel (PLAVIX) 75 MG tablet, TAKE 1 TABLET BY MOUTH ONCE DAILY, Disp: 90 tablet, Rfl: 1    donepezil (Aricept) 5 MG tablet, Take 1 tablet by mouth Every Night., Disp: 30 tablet, Rfl: 6    metoprolol succinate XL (Toprol XL) 100 MG 24 hr tablet, Take 2 tablets by mouth Daily., Disp: 180 tablet, Rfl: 1    QUEtiapine (SEROquel) 50 MG tablet, Take 1 tablet by mouth Every Night., Disp: 90 tablet, Rfl: 1    sertraline (Zoloft) 100 MG tablet, Take 1 tablet by mouth Daily., Disp: 90 tablet, Rfl: 0    thiamine (VITAMIN B-1) 100 MG tablet  tablet, Take 1 tablet by mouth Daily., Disp: , Rfl:     Medication Considerations:  ASIA reviewed and appropriate.      Allergies:   Allergies   Allergen Reactions    Diclofenac GI Bleeding and Unknown (See Comments)    Tofranil [Imipramine Hcl] Other (See Comments)     Syncope, falls    Lisinopril Cough       Results Reviewed:   Results    Reviewed most recent labs  Objective     Physical Exam:  Vital Signs:   Vitals:    11/25/24 1044 11/25/24 1046   BP:  136/84   Pulse:  50   SpO2:  97%   Weight: 70.4 kg (155 lb 3.2 oz)    Height: 177.8 cm (70\")      Body mass index is 22.27 kg/m².     Mental Status Exam:   Hygiene:   good   Cooperation:  Cooperative  Eye Contact:  Good  Psychomotor Behavior:  Slow  Affect:  Blunted  Mood: normal  Speech:  Normal  Thought Process:  Goal directed and Linear  Thought " Content:  Normal  Suicidal:  None  Homicidal:  None  Hallucinations:  None  Delusion:  None  Memory:  Intact  Orientation:  Person, Place, Time, and Situation  Reliability:  fair  Insight:  Fair  Judgement:  Fair  Impulse Control:  Fair  Physical/Medical Issues:   see problem list      Assessment / Plan      Visit Diagnosis/Orders Placed This Visit:  Diagnoses and all orders for this visit:    1. Major depressive disorder, recurrent episode, moderate  -     sertraline (Zoloft) 100 MG tablet; Take 1 tablet by mouth Daily.  Dispense: 90 tablet; Refill: 0    2. Generalized anxiety disorder  -     sertraline (Zoloft) 100 MG tablet; Take 1 tablet by mouth Daily.  Dispense: 90 tablet; Refill: 0             Functional Status: Mild impairment     Prognosis: Guarded with Ongoing Treatment    Impression/Formulation:  Patient appeared alert and oriented.  Patient is voluntarily requesting to continue outpatient psychiatric treatment at [facility].  Patient is receptive to assistance with maintaining a stable lifestyle.  Patient presents with history of     ICD-10-CM ICD-9-CM   1. Major depressive disorder, recurrent episode, moderate  F33.1 296.32   2. Generalized anxiety disorder  F41.1 300.02     Reviewed patient's previous provider notes. Reviewed most recent labs. Patient meets DSM V diagnostic criteria for diagnoses. Diagnoses may be updated as more information becomes available.     Assessment & Plan  1. Depression.  The patient reports improvement in mood, increased interactivity, and better appetite since the increase in Zoloft. He is also engaging in a workout program with his wife three days a week. He is currently taking Seroquel for sleep, which has been effective. He was reminded to be cautious with alcohol consumption due to its sedative effects, especially when combined with Seroquel. A 90-day prescription for Zoloft will be sent to his pharmacy.      Follow-up  Return in 3 months for  follow up or sooner if needed.    Treatment Plan:   Continue zoloft 100 mg daily  Advised concerns regarding alcohol use with seroquel  Consider individual therapy  Patient will continue supportive psychotherapy efforts and medications as indicated. Clinic will obtain release of information for current treatment team for continuity of care as needed. Patient will contact this office, call 911 or present to the nearest emergency room should suicidal or homicidal ideations occur.  Discussed medication options and treatment plan of prescribed medication(s) as well as the risks, benefits, and potential side effects. Patient acknowledged and verbally consented to continue with current treatment plan and was educated on the importance of compliance with treatment and follow-up appointments.     Patient instructions:  Medication risks and side effects discussed with patient including risk for worsening mood, changes in behavior, thoughts of suicide or homicide, induction of laina, serotonin syndrome, rare hyponatremia.   If any thoughts of SI or HI, worsening mood or changes in behavior, call 911 or crisis line [phone], or go to nearest ER at once. Pt.verbalizes understanding and consents to treatment with this medication.     Follow Up:   Return in about 3 months (around 2/25/2025) for Med Check.    Patient or patient representative verbalized consent for the use of Ambient Listening during the visit with  LUCY Newberry for chart documentation. 11/25/2024  11:16 EST    LUCY Newberry, PMHNP-BC [facility]

## 2024-11-26 ENCOUNTER — HOSPITAL ENCOUNTER (OUTPATIENT)
Dept: PHYSICAL THERAPY | Facility: HOSPITAL | Age: 79
Setting detail: THERAPIES SERIES
Discharge: HOME | End: 2024-11-26
Payer: MEDICARE

## 2024-11-26 ENCOUNTER — TELEPHONE (OUTPATIENT)
Dept: NEUROLOGY | Facility: CLINIC | Age: 79
End: 2024-11-26
Payer: MEDICARE

## 2024-11-26 DIAGNOSIS — R26.89 BALANCE PROBLEM: Primary | ICD-10-CM

## 2024-11-26 PROCEDURE — 97110 THERAPEUTIC EXERCISES: CPT | Performed by: PHYSICAL THERAPIST

## 2024-11-26 PROCEDURE — 97112 NEUROMUSCULAR REEDUCATION: CPT | Performed by: PHYSICAL THERAPIST

## 2024-11-26 PROCEDURE — 97116 GAIT TRAINING THERAPY: CPT | Performed by: PHYSICAL THERAPIST

## 2024-11-26 NOTE — THERAPY TREATMENT NOTE
"Physical Therapy Daily Note      Patient: Ernesto Vann   : 1945  MRN: [id number]   Diagnosis/ICD-10 Code:      ICD-10-CM ICD-9-CM   1. Balance problem  R26.89 781.99      Referring practitioner: Mali Galeana MD  Today's Date: 2024      Subjective:   Patient reports: \"My balance is better.\"  Pain: 2/10    Objective   See Exercise, Manual, and Modality Logs for complete treatment.    Exercise 1  Exercise Name 1: NuStep B UE/LEs; level 7  Time 1: 8 min  Additional Comments: 45 spm  Exercise 2  Exercise Name 2: Ascend/decend 12 steps x 2 with pt performing non-recip pattern with descending and going between recip and non-recip with ascending with pt having to lean or touch HR or bar 50% of the time.  Additional Comments: 12 steps x 2  Exercise 3  Exercise Name 3: One foot on first step and other foot stepping to the third step and down without UE A  Reps 3: 10  Additional Comments: three steps; min A  Exercise 4  Exercise Name 4: St balance with performing small squate and then tossing small red swiss ball on/off wall; repeat with bounce/catch ball and then toss/catch overhead; standing beside wall with rotational toss/catch ball on/off wall  Additional Comments: small red swiss ball; CGA  Exercise 5  Exercise Name 5: Supine bridge with B LEs on green swiss ball and performing bridge with 2 sec hold; upper abdominal crunch; B lower trunk rotation  Sets 5: 2  Reps 5: 10  Exercise 6  Exercise Name 6: small red ball under knees with lower abdominal crunch  Sets 6: 2  Reps 6: 10    \  PT Neuro          Assessment & Plan       Assessment  Assessment details: Pt requires min A with standing dynamic balance in stairwell with pt having LOB up to 60% of the time.  Pt has fear of imbalance and falling when performing stair climbing and balance activities without UE A.  Pt reports increased B foot pain during standing balance activities.  Pt to call MD about increase neuropathy pain in feet.  Pt to be seen " for one final appointment and then will be discharged.    Plan  Plan details: Pt to continue with PT services to improve gait, balance, strength, and overall functional mobility for one final visit and then discharge.              Adrienne Salguero, PT  [place] License #: [id number]    Physical Therapist

## 2024-11-26 NOTE — TELEPHONE ENCOUNTER
"Provider: KAYA    Caller: Ernesto Vann \"Steve\"    Relationship to Patient: Self    Pharmacy: [facility]    Phone Number: [phone]    Reason for Call: PT CALLED TO GET A SOONER APPT DUE TO HAVING MORE PAIN IN BOTH  HIS ANKLES AND BURNING. HE RATES THE PAIN ABOUT A 4 OUT OF 10 IN THE MORNING, BUT BY LATE AFTERNOON GOES UP TO ABOUT 7-8 OUT OF 10.  HE HAS TRIED ICY HOT ON HIS ANKLES FOR THE PAIN AND IT DIDN'T HELP.  PT TAKES EXTRA STRENGTH TYLENOL, 2 TABLETS TWICE A DAY MOST DAYS FOR A BACK ISSUE, BUT IT DOES NOT HELP WITH THE NEUROPATHY PAIN.      THE SOONEST APPT IS NOT UNTIL 1/27/25 AND HE WOULD LIKE TO BE SEEN SOONER THAN THAT.      PLEASE REVIEW AND ADVISE     THANK YOU   "

## 2024-12-02 ENCOUNTER — OFFICE VISIT (OUTPATIENT)
Dept: INTERNAL MEDICINE | Facility: CLINIC | Age: 79
End: 2024-12-02
Payer: MEDICARE

## 2024-12-02 VITALS
OXYGEN SATURATION: 98 % | HEART RATE: 55 BPM | RESPIRATION RATE: 16 BRPM | DIASTOLIC BLOOD PRESSURE: 70 MMHG | HEIGHT: 70 IN | SYSTOLIC BLOOD PRESSURE: 132 MMHG | WEIGHT: 155 LBS | BODY MASS INDEX: 22.19 KG/M2

## 2024-12-02 DIAGNOSIS — R79.9 ABNORMAL FINDING OF BLOOD CHEMISTRY, UNSPECIFIED: ICD-10-CM

## 2024-12-02 DIAGNOSIS — F41.1 GENERALIZED ANXIETY DISORDER: ICD-10-CM

## 2024-12-02 DIAGNOSIS — I73.9 PVD (PERIPHERAL VASCULAR DISEASE): ICD-10-CM

## 2024-12-02 DIAGNOSIS — G62.9 NEUROPATHY: Primary | ICD-10-CM

## 2024-12-02 DIAGNOSIS — E87.1 HYPONATREMIA: ICD-10-CM

## 2024-12-02 PROCEDURE — G2211 COMPLEX E/M VISIT ADD ON: HCPCS | Performed by: FAMILY MEDICINE

## 2024-12-02 PROCEDURE — 1160F RVW MEDS BY RX/DR IN RCRD: CPT | Performed by: FAMILY MEDICINE

## 2024-12-02 PROCEDURE — 99214 OFFICE O/P EST MOD 30 MIN: CPT | Performed by: FAMILY MEDICINE

## 2024-12-02 PROCEDURE — 1159F MED LIST DOCD IN RCRD: CPT | Performed by: FAMILY MEDICINE

## 2024-12-02 PROCEDURE — 1125F AMNT PAIN NOTED PAIN PRSNT: CPT | Performed by: FAMILY MEDICINE

## 2024-12-02 PROCEDURE — 3075F SYST BP GE 130 - 139MM HG: CPT | Performed by: FAMILY MEDICINE

## 2024-12-02 PROCEDURE — 3078F DIAST BP <80 MM HG: CPT | Performed by: FAMILY MEDICINE

## 2024-12-02 RX ORDER — GABAPENTIN 100 MG/1
100 CAPSULE ORAL 2 TIMES DAILY
Qty: 60 CAPSULE | Refills: 0 | Status: SHIPPED | OUTPATIENT
Start: 2024-12-02

## 2024-12-02 RX ORDER — FEXOFENADINE HCL 180 MG/1
180 TABLET ORAL DAILY
COMMUNITY

## 2024-12-02 NOTE — PATIENT INSTRUCTIONS
Diagnosis Discussed   Continue to monitor   Plenty of fluids- recommend liquid IV- electrolyte replacement   Labs ordered will notify of results when available   Trial medication as discussed   Follow up with Cardiology as directed   If symptoms worsen or persist please seek further evaluation

## 2024-12-02 NOTE — PROGRESS NOTES
Office Note     Name: Ernesto Vann    : 1945     MRN: [id number]     Chief Complaint  Peripheral Neuropathy (Pt states has gotten worse since seeing neurology, Dr. Fernandez. Pt would like to discuss possible Rx until being able to get back in w/ neurology, next available is end .) and Arthritis    Subjective     History of Present Illness:  Ernesto Vann is a 79 y.o. male who presents today for concerns for neuropathy- has gotten worse since last seen by neurology- next appt not until end      Started on Aricept 5mg daily per Dr. Fernandez     Has been in PT and they suggested gabapentin/lyrica   Has been taking Tylenol/Advil for lower back   Has been using icy hot for pain- lower back     Has made a deal with wife- will share 1 bottle of wine with dinner   Met with Veronica Vázquez NP- continuing current medications  Zoloft increased to 100mg - has noticed a change for the better with the medication.     I spent 25 minutes on the separately reported service. This time is not included in the time used to support the E/M service also reported today.  This time includes activities preparing for the visit, reviewing test, reviewing history and performing an appropriate examination. Discussing with the patient and reviewing and independently interpreting the diagnostic studies, communicating that information to the patient along with discussing care coordination and referrals if needed and documenting information in the medical records.         Review of Systems:   Review of Systems   Constitutional:  Negative for chills and fever.   Respiratory:  Negative for cough, chest tightness, shortness of breath and wheezing.    Cardiovascular:  Negative for chest pain, palpitations and leg swelling.   Gastrointestinal:  Negative for abdominal pain, blood in stool, constipation, nausea and vomiting.   Genitourinary:  Negative for dysuria.   Musculoskeletal:  Positive for arthralgias and  myalgias. Negative for gait problem.   Skin:  Negative for rash.   Neurological:  Positive for weakness, numbness and memory problem. Negative for light-headedness and headache.   Psychiatric/Behavioral:  Positive for depressed mood. Negative for self-injury, sleep disturbance and suicidal ideas. The patient is nervous/anxious.        Past Medical History:   Past Medical History:   Diagnosis Date    Alcohol abuse     Allergic     Anxiety     Benign colon polyp     Cancer     PROSTATE CANCER    Cataract     Chronic obstructive pulmonary disease 06/17/2016    COPD (chronic obstructive pulmonary disease)     DDD (degenerative disc disease), lumbar     Depression     Diabetes mellitus     Dyslipidemia     Elevated PSA 12/2018    GERD (gastroesophageal reflux disease)     Hyperlipidemia     Hypertension     Inguinal hernia     Irritable bowel syndrome with both constipation and diarrhea 5/15/2024    PAD (peripheral artery disease)     Prostate cancer     SAH (subarachnoid hemorrhage) 06/30/2021    Tobacco abuse        Past Surgical History:   Past Surgical History:   Procedure Laterality Date    BELPHAROPTOSIS REPAIR Bilateral 09/01/2019    COLONOSCOPY  2019    EYE SURGERY      cataract    FEMORAL ARTERY - FEMORAL ARTERY BYPASS GRAFT      FEMORAL ARTERY STENT      INGUINAL HERNIA REPAIR Right     LUMBAR SYMPATHETIC NERVE BLOCK      PROSTATECTOMY N/A 11/22/2016    Procedure: PROSTATECTOMY LAPAROSCOPIC WITH [employer] ROBOT WITH NODES;  Surgeon: Victorino Emerson Jr., MD;  Location: [facility];  Service:     TONSILLECTOMY      TONSILLECTOMY AND ADENOIDECTOMY      VASECTOMY         Immunizations:   Immunization History   Administered Date(s) Administered    COVID-19 (MODERNA) 12YRS+ (SPIKEVAX) 12/06/2023    COVID-19 (PFIZER) 12YRS+ (COMIRNATY) 10/09/2024    COVID-19 (PFIZER) Purple Cap Monovalent 03/04/2021, 03/28/2021, 10/21/2021    Covid-19 (Pfizer) Gray Cap Monovalent 08/19/2022    Fluzone High-Dose 65+YRS 09/22/2016,  10/03/2017, 10/01/2018, 10/02/2019, 10/24/2020, 10/09/2024    Fluzone High-Dose 65+yrs 10/24/2020, 11/29/2021, 09/21/2022, 10/19/2023    PPD Test 09/19/2016    Pneumococcal Conjugate 13-Valent (PCV13) 03/14/2018, 10/21/2021    Pneumococcal Polysaccharide (PPSV23) 03/03/2019    Shingrix 03/16/2018, 08/28/2018    TD Preservative Free (Tenivac) 01/11/2023    Tdap 04/28/2021, 06/29/2021    Zostavax 09/01/2017        Medications:     Current Outpatient Medications:     Acetaminophen 500 MG capsule, Take 2 capsules by mouth Every 6 (Six) Hours., Disp: , Rfl:     amLODIPine (NORVASC) 10 MG tablet, Take 1 tablet by mouth Daily., Disp: 90 tablet, Rfl: 1    atorvastatin (LIPITOR) 20 MG tablet, Take 1 tablet by mouth Daily., Disp: 90 tablet, Rfl: 1    clopidogrel (PLAVIX) 75 MG tablet, TAKE 1 TABLET BY MOUTH ONCE DAILY, Disp: 90 tablet, Rfl: 1    donepezil (Aricept) 5 MG tablet, Take 1 tablet by mouth Every Night., Disp: 30 tablet, Rfl: 6    fexofenadine (ALLEGRA) 180 MG tablet, Take 1 tablet by mouth Daily., Disp: , Rfl:     metoprolol succinate XL (Toprol XL) 100 MG 24 hr tablet, Take 2 tablets by mouth Daily., Disp: 180 tablet, Rfl: 1    QUEtiapine (SEROquel) 50 MG tablet, Take 1 tablet by mouth Every Night., Disp: 90 tablet, Rfl: 1    sertraline (Zoloft) 100 MG tablet, Take 1 tablet by mouth Daily., Disp: 90 tablet, Rfl: 0    thiamine (VITAMIN B-1) 100 MG tablet  tablet, Take 1 tablet by mouth Daily., Disp: , Rfl:     gabapentin (NEURONTIN) 100 MG capsule, Take 1 capsule by mouth 2 (Two) Times a Day., Disp: 60 capsule, Rfl: 0    Allergies:   Allergies   Allergen Reactions    Diclofenac GI Bleeding and Unknown (See Comments)    Tofranil [Imipramine Hcl] Other (See Comments)     Syncope, falls    Lisinopril Cough       Family History:   Family History   Problem Relation Age of Onset    Cancer Mother     Hypertension Mother     Dementia Mother     Heart disease Father     Heart attack Father     Breast cancer Sister     Brain  "cancer Sister     Lung cancer Sister     Dementia Maternal Grandmother        Social History:   Social History     Socioeconomic History    Marital status:     Number of children: 1   Tobacco Use    Smoking status: Former     Current packs/day: 0.00     Average packs/day: 1 pack/day for 30.0 years (30.0 ttl pk-yrs)     Types: Cigarettes     Start date: 2000     Quit date: 2023     Years since quittin.3     Passive exposure: Past    Smokeless tobacco: Never    Tobacco comments:     HAS SMOKED 1PPD IN THE PAST   Vaping Use    Vaping status: Never Used   Substance and Sexual Activity    Alcohol use: Not Currently     Alcohol/week: 30.0 - 35.0 standard drinks of alcohol     Types: 30 - 35 Glasses of wine per week     Comment: admits to 6 daily    Drug use: No    Sexual activity: Defer         Objective     Vital Signs  /70   Pulse 55   Resp 16   Ht 177.8 cm (70\")   Wt 70.3 kg (155 lb)   SpO2 98%   BMI 22.24 kg/m²   Estimated body mass index is 22.24 kg/m² as calculated from the following:    Height as of this encounter: 177.8 cm (70\").    Weight as of this encounter: 70.3 kg (155 lb).    BMI is within normal parameters. No other follow-up for BMI required.      Physical Exam  Vitals and nursing note reviewed.   Constitutional:       General: He is not in acute distress.     Appearance: Normal appearance.   HENT:      Head: Normocephalic and atraumatic.   Cardiovascular:      Rate and Rhythm: Normal rate and regular rhythm.      Heart sounds: Normal heart sounds.   Pulmonary:      Effort: Pulmonary effort is normal. No respiratory distress.      Breath sounds: Normal breath sounds.   Musculoskeletal:         General: Normal range of motion.      Comments: Moving all extremities    Skin:     General: Skin is warm and dry.   Neurological:      General: No focal deficit present.      Mental Status: He is alert and oriented to person, place, and time.          Procedures     Assessment and " Plan   Diagnosis Discussed   Continue to monitor   Plenty of fluids- recommend liquid IV- electrolyte replacement   Labs ordered will notify of results when available   Trial medication as discussed   Follow up with Cardiology as directed   If symptoms worsen or persist please seek further evaluation     1. Neuropathy  - CBC (No Diff); Future  - Vitamin B12; Future  - Folate; Future  - Iron Profile; Future  - Ferritin; Future  - gabapentin (NEURONTIN) 100 MG capsule; Take 1 capsule by mouth 2 (Two) Times a Day.  Dispense: 60 capsule; Refill: 0  - Comprehensive metabolic panel; Future    2. Abnormal finding of blood chemistry, unspecified  - Iron Profile; Future  - Ferritin; Future    3. PVD (peripheral vascular disease)  - Ambulatory Referral to Cardiology    4. Generalized anxiety disorder  Stable on current medications. Continue current medications and follow up with Behavioral Health as scheduled.     5. Hyponatremia  - Comprehensive metabolic panel; Future   Will reevaluate. Recheck today.   Recommend liquid IV   Long term alcohol use- now has quit/cut down. 2 glasses per wine per day at most.     Follow Up  Return in about 1 month (around 1/2/2025), or if symptoms worsen or fail to improve, for follow up neuropathy .    Mali Galeana MD  [facility] INTERNAL MEDICINE  [address] [place] [id number] [phone]

## 2024-12-30 DIAGNOSIS — G62.9 NEUROPATHY: Primary | ICD-10-CM

## 2024-12-30 NOTE — TELEPHONE ENCOUNTER
Last appointment: 12/2/2024  Next appointment: 1/8/2025       Last Refill: 12/2/2024 quantity of 60 with 0 refills

## 2025-01-02 RX ORDER — GABAPENTIN 100 MG/1
100 CAPSULE ORAL 2 TIMES DAILY
Qty: 60 CAPSULE | Refills: 0 | Status: SHIPPED | OUTPATIENT
Start: 2025-01-02

## 2025-01-07 ENCOUNTER — TELEPHONE (OUTPATIENT)
Dept: CARDIOLOGY | Facility: CLINIC | Age: 80
End: 2025-01-07

## 2025-01-14 ENCOUNTER — OFFICE VISIT (OUTPATIENT)
Dept: CARDIOLOGY | Facility: CLINIC | Age: 80
End: 2025-01-14
Payer: MEDICARE

## 2025-01-14 VITALS
HEIGHT: 70 IN | HEART RATE: 52 BPM | OXYGEN SATURATION: 98 % | SYSTOLIC BLOOD PRESSURE: 172 MMHG | DIASTOLIC BLOOD PRESSURE: 76 MMHG | BODY MASS INDEX: 23.05 KG/M2 | WEIGHT: 161 LBS

## 2025-01-14 DIAGNOSIS — E78.5 HYPERLIPIDEMIA, UNSPECIFIED HYPERLIPIDEMIA TYPE: ICD-10-CM

## 2025-01-14 DIAGNOSIS — I73.9 PERIPHERAL VASCULAR DISEASE: Primary | ICD-10-CM

## 2025-01-14 DIAGNOSIS — I10 ESSENTIAL HYPERTENSION: ICD-10-CM

## 2025-01-14 RX ORDER — CETIRIZINE HYDROCHLORIDE 10 MG/1
10 TABLET ORAL DAILY
COMMUNITY

## 2025-01-14 RX ORDER — SOLIFENACIN SUCCINATE 10 MG/1
1 TABLET, FILM COATED ORAL DAILY
COMMUNITY
Start: 2025-01-08

## 2025-01-14 NOTE — PROGRESS NOTES
[facility] Cardiology  Office Note  Ernesto Vann  1945  [address] [id number]     VISIT DATE:  01/14/25    PCP: Mali Galeana MD  [address] [address]    CC: PVD  Chief Complaint   Patient presents with    Peripheral Vascular Disease       PROBLEM LIST:    Peripheral vascular disease status post intervention with right femoral to popliteal bypass with vein  Hypertension  Hyperlipidemia    Allergies  Allergies   Allergen Reactions    Diclofenac GI Bleeding and Unknown (See Comments)    Tofranil [Imipramine Hcl] Other (See Comments)     Syncope, falls    Lisinopril Cough       Current Medications    Current Outpatient Medications:     Acetaminophen 500 MG capsule, Take 2 capsules by mouth Every 6 (Six) Hours., Disp: , Rfl:     amLODIPine (NORVASC) 10 MG tablet, Take 1 tablet by mouth Daily., Disp: 90 tablet, Rfl: 1    atorvastatin (LIPITOR) 20 MG tablet, Take 1 tablet by mouth Daily., Disp: 90 tablet, Rfl: 1    cetirizine (zyrTEC) 10 MG tablet, Take 1 tablet by mouth Daily., Disp: , Rfl:     clopidogrel (PLAVIX) 75 MG tablet, TAKE 1 TABLET BY MOUTH ONCE DAILY, Disp: 90 tablet, Rfl: 1    donepezil (Aricept) 5 MG tablet, Take 1 tablet by mouth Every Night., Disp: 30 tablet, Rfl: 6    gabapentin (NEURONTIN) 100 MG capsule, Take 1 capsule by mouth twice daily, Disp: 60 capsule, Rfl: 0    metoprolol succinate XL (Toprol XL) 100 MG 24 hr tablet, Take 2 tablets by mouth Daily., Disp: 180 tablet, Rfl: 1    sertraline (Zoloft) 100 MG tablet, Take 1 tablet by mouth Daily., Disp: 90 tablet, Rfl: 0    solifenacin (VESICARE) 10 MG tablet, Take 1 tablet by mouth Daily., Disp: , Rfl:      History of Present Illness   Ernesto Vann is a 79 y.o. year old male who presents for consult from Mali Galeana MD for evaluation of peripheral vascular disease.  Patient states he does have numbness of bilateral lower extremities is not new for him.  No coldness or change in  sensation otherwise.  Patient states approximately a decade ago he had issues with his right lower extremity with pain on ambulation his thigh and calf.  Patient underwent intervention with a stent in what appears to be a superficial femoral artery but discussion.  Patient ultimately had closure of such.  Patient ultimately underwent saphenous vein bypass from his right femoral to popliteal artery in discussion with him.  Patient was in [facility] during that time is moved back to this area.  Patient was there working at the time.  Patient states he does have pain on ambulation however this is not new for him.  This mostly been right-sided not left-sided.  No syncope or lightheadedness.  No chest pain pressure discomfort.  Patient is compliant his medical therapy.  Patient states blood pressure at home runs 130s over 80s.    Pt denies any chest pain, dyspnea at rest, dyspnea on exertion, orthopnea, PND, palpitations, lower extremity edema, or . Pt denies history of CHF, DVT, PE, MI, CVA, TIA, or rheumatic fever.     SOCIAL HX  Social History     Socioeconomic History    Marital status:     Number of children: 1   Tobacco Use    Smoking status: Former     Current packs/day: 0.00     Average packs/day: 1 pack/day for 30.0 years (30.0 ttl pk-yrs)     Types: Cigarettes     Start date: 2000     Quit date: 2023     Years since quittin.4     Passive exposure: Past    Smokeless tobacco: Never    Tobacco comments:     HAS SMOKED 1PPD IN THE PAST   Vaping Use    Vaping status: Never Used   Substance and Sexual Activity    Alcohol use: Not Currently     Alcohol/week: 30.0 - 35.0 standard drinks of alcohol     Types: 30 - 35 Glasses of wine per week     Comment: admits to 6 daily    Drug use: No    Sexual activity: Defer       FAMILY HX  Family History   Problem Relation Age of Onset    Cancer Mother     Hypertension Mother     Dementia Mother     Heart disease Father     Heart attack Father     Breast cancer  "Sister     Brain cancer Sister     Lung cancer Sister     Dementia Maternal Grandmother        Vitals:    01/14/25 1044   BP: 172/76   BP Location: Right arm   Patient Position: Sitting   Cuff Size: Adult   Pulse: 52   SpO2: 98%   Weight: 73 kg (161 lb)   Height: 177.8 cm (70\")     Body mass index is 23.1 kg/m².     PHYSICAL EXAMINATION:  Vitals and nursing note reviewed.   Constitutional:       Appearance: Healthy appearance. Not in distress.   Eyes:      Conjunctiva/sclera: Conjunctivae normal.      Pupils: Pupils are equal, round, and reactive to light.   HENT:      Nose: Nose normal.    Mouth/Throat:      Pharynx: Oropharynx is clear.   Neck:      Vascular: JVD normal.   Pulmonary:      Effort: Pulmonary effort is normal.      Breath sounds: Normal breath sounds. No wheezing. No rhonchi. No rales.   Cardiovascular:      PMI at left midclavicular line. Normal rate. Regular rhythm. Normal S1. Normal S2.       Murmurs: There is no murmur.      No gallop.  No click. No rub.   Pulses:     Decreased pulses.   Abdominal:      General: Bowel sounds are normal.      Palpations: Abdomen is soft.      Tenderness: There is no abdominal tenderness.   Musculoskeletal: Normal range of motion.         General: No tenderness.      Cervical back: Normal range of motion. Skin:     General: Skin is warm and dry.   Neurological:      General: No focal deficit present.      Mental Status: Alert and oriented to person, place and time.      Cranial Nerves: Cranial nerves 2-12 are intact.         Diagnostic Data:  Lab Results   Component Value Date    CHLPL 254 (H) 01/19/2016    TRIG 51 10/22/2024    HDL 70 (H) 10/22/2024     Lab Results   Component Value Date    GLUCOSE 100 (H) 10/22/2024    BUN 13 10/22/2024    CREATININE 0.85 10/22/2024     (L) 10/22/2024    K 4.4 10/22/2024    CL 96 (L) 10/22/2024    CO2 27.4 10/22/2024     Lab Results   Component Value Date    HGBA1C 5.10 10/22/2024     Lab Results   Component Value Date    " WBC 4.52 10/22/2024    HGB 11.8 (L) 10/22/2024    HCT 35.7 (L) 10/22/2024     10/22/2024         ECG 12 Lead    Date/Time: 1/14/2025 12:52 PM  Performed by: Siva France MD    Authorized by: Siva France MD  Comparison: not compared with previous ECG   Rhythm: sinus rhythm    Clinical impression: normal ECG          Advance Care Planning   ACP discussion was declined by the patient. Patient does not have an advance directive, declines further assistance.         ASSESSMENT:  Diagnoses and all orders for this visit:    1. Peripheral vascular disease (Primary)  -     US Arterial Doppler Lower Extremity Complete; Future    2. Essential hypertension  -     US Arterial Doppler Lower Extremity Complete; Future    3. Hyperlipidemia, unspecified hyperlipidemia type  -     US Arterial Doppler Lower Extremity Complete; Future        PLAN:    Shayne exercise program with patient.  Patient to continue as he has been doing since for multiple years  Continue to monitor blood pressure at home is controlled there  Patient undergo surveillance duplex of lower extremities at this time.  If graft is patent however has significant stenosis may need intervention to aid with graft longevity  Continue medical therapy otherwise at this time.    Return in about 6 months (around 7/14/2025).     Siva France MD

## 2025-02-01 DIAGNOSIS — G62.9 NEUROPATHY: Primary | ICD-10-CM

## 2025-02-03 RX ORDER — GABAPENTIN 100 MG/1
100 CAPSULE ORAL 2 TIMES DAILY
Qty: 60 CAPSULE | Refills: 1 | Status: SHIPPED | OUTPATIENT
Start: 2025-02-03

## 2025-03-18 DIAGNOSIS — F33.1 MAJOR DEPRESSIVE DISORDER, RECURRENT EPISODE, MODERATE: ICD-10-CM

## 2025-03-18 DIAGNOSIS — F41.1 GENERALIZED ANXIETY DISORDER: ICD-10-CM

## 2025-03-18 RX ORDER — SERTRALINE HYDROCHLORIDE 100 MG/1
100 TABLET, FILM COATED ORAL DAILY
Qty: 90 TABLET | Refills: 0 | OUTPATIENT
Start: 2025-03-18

## 2025-03-21 ENCOUNTER — HOSPITAL ENCOUNTER (OUTPATIENT)
Dept: CARDIOLOGY | Facility: HOSPITAL | Age: 80
Discharge: HOME | End: 2025-03-21
Payer: MEDICARE

## 2025-03-21 DIAGNOSIS — I73.9 PERIPHERAL VASCULAR DISEASE: ICD-10-CM

## 2025-03-21 DIAGNOSIS — E78.5 HYPERLIPIDEMIA, UNSPECIFIED HYPERLIPIDEMIA TYPE: ICD-10-CM

## 2025-03-21 DIAGNOSIS — I10 ESSENTIAL HYPERTENSION: ICD-10-CM

## 2025-03-21 LAB
BH CV GRAFT BRACHIAL PRESSURE LEFT: 158 MMHG
BH CV LEA LEFT ANT TIBIAL A DISTAL PSV: 18.5 CM/S
BH CV LEA LEFT ANT TIBIAL A MID PSV: 50.9 CM/S
BH CV LEA LEFT ANT TIBIAL A PROX PSV: 23.7 CM/S
BH CV LEA LEFT CFA DISTAL PSV: 98 CM/S
BH CV LEA LEFT DFA PROX PSV: 65.78 CM/S
BH CV LEA LEFT DPA PRESSURE: 80 MMHG
BH CV LEA LEFT PERONEAL  MID PSV: 20.2 CM/S
BH CV LEA LEFT POPITEAL A  DISTAL PSV: 17.2 CM/S
BH CV LEA LEFT POPITEAL A  PROX EDV: 9.3 CM/S
BH CV LEA LEFT POPITEAL A  PROX PSV: 152.8 CM/S
BH CV LEA LEFT PTA DISTAL PSV: 19.3 CM/S
BH CV LEA LEFT PTA MID PSV: 24.3 CM/S
BH CV LEA LEFT PTA PRESSURE: 102 MMHG
BH CV LEA LEFT PTA PROX PSV: 16.1 CM/S
BH CV LEA LEFT SFA DISTAL PSV: 34.6 CM/S
BH CV LEA LEFT SFA MID PSV: 31.8 CM/S
BH CV LEA LEFT SFA PROX PSV: 39.6 CM/S
BH CV LEA LEFT TIBEOPERONEAL PSV: 19.6 CM/S
BH CV LEA RIGHT ANT TIBIAL A DISTAL PSV: 27.7 CM/S
BH CV LEA RIGHT ANT TIBIAL A MID PSV: 33.2 CM/S
BH CV LEA RIGHT ANT TIBIAL A PROX PSV: 76.9 CM/S
BH CV LEA RIGHT CFA DISTAL PSV: 389.1 CM/S
BH CV LEA RIGHT DFA PROX PSV: 63.82 CM/S
BH CV LEA RIGHT DPA PRESSURE: 106 MMHG
BH CV LEA RIGHT PERONEAL  MID PSV: 22.4 CM/S
BH CV LEA RIGHT POPITEAL A  DISTAL PSV: 23.8 CM/S
BH CV LEA RIGHT POPITEAL A  PROX PSV: 24.7 CM/S
BH CV LEA RIGHT PTA DISTAL PSV: 21.7 CM/S
BH CV LEA RIGHT PTA MID PSV: 28.1 CM/S
BH CV LEA RIGHT PTA PRESSURE: 122 MMHG
BH CV LEA RIGHT PTA PROX PSV: 43.6 CM/S
BH CV LEA RIGHT SFA DISTAL PSV: 51.8 CM/S
BH CV LEA RIGHT SFA MID PSV: 39 CM/S
BH CV LEA RIGHT SFA PROX PSV: 31.5 CM/S
BH CV LEA RIGHT TIBEOPERONEAL PSV: 31.16 CM/S
BH CV LOWER ARTERIAL LEFT ABI RATIO: 0.64
BH CV LOWER ARTERIAL RIGHT ABI RATIO: 0.77
BH CV LOWER ARTERIAL RIGHT HIGHEST VELOCITY RATIO: 4.86
BH CV LOWER ARTERIAL RIGHT VELOCITY RATIO LOCATION: NORMAL
RIGHT GROIN CFA SYS: 387.4 CM/SEC

## 2025-03-21 PROCEDURE — 93925 LOWER EXTREMITY STUDY: CPT

## 2025-04-09 ENCOUNTER — OFFICE VISIT (OUTPATIENT)
Dept: INTERNAL MEDICINE | Facility: CLINIC | Age: 80
End: 2025-04-09
Payer: MEDICARE

## 2025-04-09 VITALS
OXYGEN SATURATION: 96 % | HEIGHT: 70 IN | HEART RATE: 54 BPM | BODY MASS INDEX: 23.19 KG/M2 | RESPIRATION RATE: 16 BRPM | WEIGHT: 162 LBS | SYSTOLIC BLOOD PRESSURE: 120 MMHG | DIASTOLIC BLOOD PRESSURE: 64 MMHG

## 2025-04-09 DIAGNOSIS — F33.1 MAJOR DEPRESSIVE DISORDER, RECURRENT EPISODE, MODERATE: Primary | ICD-10-CM

## 2025-04-09 DIAGNOSIS — I10 ESSENTIAL HYPERTENSION: ICD-10-CM

## 2025-04-09 DIAGNOSIS — M79.89 RIGHT LEG SWELLING: ICD-10-CM

## 2025-04-09 DIAGNOSIS — I73.9 PVD (PERIPHERAL VASCULAR DISEASE): ICD-10-CM

## 2025-04-09 DIAGNOSIS — F10.982 ALCOHOL INDUCED INSOMNIA: ICD-10-CM

## 2025-04-09 DIAGNOSIS — F41.1 GENERALIZED ANXIETY DISORDER: ICD-10-CM

## 2025-04-09 DIAGNOSIS — E78.5 HYPERLIPIDEMIA, UNSPECIFIED HYPERLIPIDEMIA TYPE: ICD-10-CM

## 2025-04-09 PROCEDURE — 1160F RVW MEDS BY RX/DR IN RCRD: CPT | Performed by: FAMILY MEDICINE

## 2025-04-09 PROCEDURE — 3074F SYST BP LT 130 MM HG: CPT | Performed by: FAMILY MEDICINE

## 2025-04-09 PROCEDURE — 1159F MED LIST DOCD IN RCRD: CPT | Performed by: FAMILY MEDICINE

## 2025-04-09 PROCEDURE — 1125F AMNT PAIN NOTED PAIN PRSNT: CPT | Performed by: FAMILY MEDICINE

## 2025-04-09 PROCEDURE — 3078F DIAST BP <80 MM HG: CPT | Performed by: FAMILY MEDICINE

## 2025-04-09 PROCEDURE — 99214 OFFICE O/P EST MOD 30 MIN: CPT | Performed by: FAMILY MEDICINE

## 2025-04-09 RX ORDER — METOPROLOL SUCCINATE 100 MG/1
200 TABLET, EXTENDED RELEASE ORAL DAILY
Qty: 180 TABLET | Refills: 1 | Status: SHIPPED | OUTPATIENT
Start: 2025-04-09 | End: 2025-04-09 | Stop reason: SDUPTHER

## 2025-04-09 RX ORDER — METOPROLOL SUCCINATE 100 MG/1
200 TABLET, EXTENDED RELEASE ORAL DAILY
Qty: 180 TABLET | Refills: 1 | Status: SHIPPED | OUTPATIENT
Start: 2025-04-09

## 2025-04-09 RX ORDER — ATORVASTATIN CALCIUM 20 MG/1
20 TABLET, FILM COATED ORAL DAILY
Qty: 90 TABLET | Refills: 1 | Status: SHIPPED | OUTPATIENT
Start: 2025-04-09

## 2025-04-09 RX ORDER — CLOPIDOGREL BISULFATE 75 MG/1
TABLET ORAL
Qty: 90 TABLET | Refills: 1 | Status: SHIPPED | OUTPATIENT
Start: 2025-04-09

## 2025-04-09 RX ORDER — QUETIAPINE FUMARATE 50 MG/1
50 TABLET, FILM COATED ORAL
COMMUNITY
Start: 2025-02-16 | End: 2025-04-09 | Stop reason: SDUPTHER

## 2025-04-09 RX ORDER — AMLODIPINE BESYLATE 10 MG/1
10 TABLET ORAL DAILY
Qty: 90 TABLET | Refills: 1 | Status: SHIPPED | OUTPATIENT
Start: 2025-04-09

## 2025-04-09 RX ORDER — QUETIAPINE FUMARATE 50 MG/1
50 TABLET, FILM COATED ORAL NIGHTLY
Qty: 90 TABLET | Refills: 1 | Status: SHIPPED | OUTPATIENT
Start: 2025-04-09

## 2025-04-09 NOTE — PATIENT INSTRUCTIONS
Diagnosis Discussed   Continue to monitor   Plenty of fluids, monitor diet and exercise   Labs ordered will notify of results - will get labs requested 12/2024   Follow up with Cardiology   Follow up with Veronica Vázquez   Will look into the CT chest- lung cancer screening   Take medications as instructed- refills today   Follow up as directed   If symptoms worsen or persist please seek further evaluation

## 2025-04-09 NOTE — PROGRESS NOTES
Office Note     Name: Ernesto Vann    : 1945     MRN: [id number]     Chief Complaint  Anxiety (Follow up, pt states has had cough x 2 months, and leg swelling and pain, had scans done but nobody called to give results since Dr. France left) and Cough    Subjective     History of Present Illness:  Ernesto Vann is a 79 y.o. male who presents today for follow up on anxiety  Cough, leg swelling and pain     PAD- seen by Dr. France   Bilateral duplex   Has been have more swelling on right leg - tender and painful         The right common femoral artery demonstrates >70% stenosis.    Patent saphenous vein graft from the right SFA to popliteal artery with no significant stenosis.    Minor nonobstructive disease of the left lower extremity.    There is an occluded stent in the right popliteal artery.    The Right CFA is patent with calcified irregular plaque, elevated velocities noted in the CFA are consistent with greater than 50% stenosis. There is monophasic flow noted in the DFA and SFA. Collaterals are visualized throughout the right lower extremity. The native popliteal artery is occluded. There is a distal fem-pop bypass noted. The Bypass appears patent with multiphasic flow. There is plaque noted throughout tibial arteries, however, no sign of stenosis noted.    Will reach to cardiology for further guidance    Mood has been stable.- has been off of medication x 2 weeks- needing refill   Has noticed improved- will follow up with Veronica Vázquez NP.   Needs to get back on medication     Has been drinking some 2-3 drinks around dinner time - keeping it under control   Has stopped ciggs    Following with Urology- stable     Will look into CT lung screening-     Left eye- closing x 1 month- finds himself having to blink   Using eye drops  Denies headaches, sob, chest pain   Tylenol twice  a day- helps with ache and pains       Review of Systems:   Review of Systems   Constitutional:  Negative for  chills and fever.   Eyes:         Left eye- eyelid closing    Respiratory:  Negative for cough, chest tightness, shortness of breath and wheezing.    Cardiovascular:  Positive for leg swelling. Negative for chest pain and palpitations.   Gastrointestinal:  Negative for abdominal pain, nausea and vomiting.   Genitourinary:  Negative for dysuria.   Musculoskeletal:  Positive for joint swelling and myalgias. Negative for gait problem.        Leg swelling- right leg    Neurological:  Negative for light-headedness and headache.   Psychiatric/Behavioral:  Positive for dysphoric mood and depressed mood. Negative for self-injury and suicidal ideas. The patient is nervous/anxious.        Past Medical History:   Past Medical History:   Diagnosis Date    Alcohol abuse     Allergic     Anxiety     Benign colon polyp     Cancer     PROSTATE CANCER    Cataract     Chronic obstructive pulmonary disease 06/17/2016    COPD (chronic obstructive pulmonary disease)     DDD (degenerative disc disease), lumbar     Depression     Diabetes mellitus     Dyslipidemia     Elevated PSA 12/2018    GERD (gastroesophageal reflux disease)     Hyperlipidemia     Hypertension     Inguinal hernia     Irritable bowel syndrome with both constipation and diarrhea 5/15/2024    PAD (peripheral artery disease)     Prostate cancer     SAH (subarachnoid hemorrhage) 06/30/2021    Tobacco abuse        Past Surgical History:   Past Surgical History:   Procedure Laterality Date    BELPHAROPTOSIS REPAIR Bilateral 09/01/2019    COLONOSCOPY  2019    EYE SURGERY      cataract    FEMORAL ARTERY - FEMORAL ARTERY BYPASS GRAFT      FEMORAL ARTERY STENT      INGUINAL HERNIA REPAIR Right     LUMBAR SYMPATHETIC NERVE BLOCK      PROSTATECTOMY N/A 11/22/2016    Procedure: PROSTATECTOMY LAPAROSCOPIC WITH MeshifyINCI ROBOT WITH NODES;  Surgeon: Victorino Emerson Jr., MD;  Location: [facility];  Service:     TONSILLECTOMY      TONSILLECTOMY AND ADENOIDECTOMY      VASECTOMY          Immunizations:   Immunization History   Administered Date(s) Administered    COVID-19 (MODERNA) 12YRS+ (SPIKEVAX) 12/06/2023    COVID-19 (PFIZER) 12YRS+ (COMIRNATY) 10/09/2024    COVID-19 (PFIZER) Purple Cap Monovalent 03/04/2021, 03/28/2021, 10/21/2021    Covid-19 (Pfizer) Gray Cap Monovalent 08/19/2022    Fluzone High-Dose 65+YRS 09/22/2016, 10/03/2017, 10/01/2018, 10/02/2019, 10/24/2020, 10/09/2024    Fluzone High-Dose 65+yrs 10/24/2020, 11/29/2021, 09/21/2022, 10/19/2023    PPD Test 09/19/2016    Pneumococcal Conjugate 13-Valent (PCV13) 03/14/2018, 10/21/2021    Pneumococcal Polysaccharide (PPSV23) 03/03/2019    Shingrix 03/16/2018, 08/28/2018    TD Preservative Free (Tenivac) 01/11/2023    Tdap 04/28/2021, 06/29/2021    Zostavax 09/01/2017        Medications:     Current Outpatient Medications:     Acetaminophen 500 MG capsule, Take 2 capsules by mouth Every 6 (Six) Hours., Disp: , Rfl:     amLODIPine (NORVASC) 10 MG tablet, Take 1 tablet by mouth Daily., Disp: 90 tablet, Rfl: 1    atorvastatin (LIPITOR) 20 MG tablet, Take 1 tablet by mouth Daily., Disp: 90 tablet, Rfl: 1    clopidogrel (PLAVIX) 75 MG tablet, TAKE 1 TABLET BY MOUTH ONCE DAILY, Disp: 90 tablet, Rfl: 1    metoprolol succinate XL (TOPROL-XL) 100 MG 24 hr tablet, Take 2 tablets by mouth Daily., Disp: 180 tablet, Rfl: 1    QUEtiapine (SEROquel) 50 MG tablet, Take 1 tablet by mouth Every Night., Disp: 90 tablet, Rfl: 1    solifenacin (VESICARE) 10 MG tablet, Take 1 tablet by mouth Daily., Disp: , Rfl:     sertraline (Zoloft) 100 MG tablet, Take one 50 mg tablet by mouth daily for two weeks, then take one 100 mg tablet by mouth daily for two weeks, after four weeks, increase to one 50 mg tablet by mouth daily along with one 100 mg tablet by mouth daily to equal 150 mg by mouth daily, Disp: 120 tablet, Rfl: 0    sertraline (Zoloft) 50 MG tablet, Take one 50 mg tablet by mouth daily for two weeks, then take one 100 mg tablet by mouth daily for two  "weeks, after four weeks, increase to one 50 mg tablet by mouth daily along with one 100 mg tablet by mouth daily to equal 150 mg by mouth daily, Disp: 120 tablet, Rfl: 0    Allergies:   Allergies   Allergen Reactions    Diclofenac GI Bleeding and Unknown (See Comments)    Tofranil [Imipramine Hcl] Other (See Comments)     Syncope, falls    Lisinopril Cough       Family History:   Family History   Problem Relation Age of Onset    Cancer Mother     Hypertension Mother     Dementia Mother     Heart disease Father     Heart attack Father     Breast cancer Sister     Brain cancer Sister     Lung cancer Sister     Dementia Maternal Grandmother        Social History:   Social History     Socioeconomic History    Marital status:     Number of children: 1   Tobacco Use    Smoking status: Former     Current packs/day: 0.00     Average packs/day: 1 pack/day for 30.0 years (30.0 ttl pk-yrs)     Types: Cigarettes     Start date: 2000     Quit date: 2023     Years since quittin.7     Passive exposure: Past    Smokeless tobacco: Never    Tobacco comments:     HAS SMOKED 1PPD IN THE PAST   Vaping Use    Vaping status: Never Used   Substance and Sexual Activity    Alcohol use: Not Currently     Alcohol/week: 30.0 - 35.0 standard drinks of alcohol     Types: 30 - 35 Glasses of wine per week     Comment: admits to 6 daily    Drug use: No    Sexual activity: Defer         Objective     Vital Signs  /64   Pulse 54   Resp 16   Ht 177.8 cm (70\")   Wt 73.5 kg (162 lb)   SpO2 96%   BMI 23.24 kg/m²   Estimated body mass index is 23.24 kg/m² as calculated from the following:    Height as of this encounter: 177.8 cm (70\").    Weight as of this encounter: 73.5 kg (162 lb).    BMI is within normal parameters. No other follow-up for BMI required.      Physical Exam  Vitals and nursing note reviewed.   Constitutional:       General: He is not in acute distress.     Appearance: Normal appearance.   HENT:      Head: " Normocephalic and atraumatic.   Cardiovascular:      Rate and Rhythm: Normal rate and regular rhythm.      Heart sounds: Normal heart sounds.   Pulmonary:      Effort: Pulmonary effort is normal. No respiratory distress.      Breath sounds: Normal breath sounds.   Musculoskeletal:         General: Normal range of motion.      Comments: Right leg swelling- chronic- mildly increased- tender.    Skin:     General: Skin is warm and dry.   Neurological:      General: No focal deficit present.      Mental Status: He is alert and oriented to person, place, and time.   Psychiatric:         Attention and Perception: Attention normal.         Mood and Affect: Mood is anxious and depressed.         Speech: Speech normal.         Behavior: Behavior is slowed.         Thought Content: Thought content normal.          Procedures     Assessment and Plan   Diagnosis Discussed   Continue to monitor   Plenty of fluids, monitor diet and exercise   Labs ordered will notify of results - will get labs requested 12/2024   Follow up with Cardiology   Follow up with Veronica Vázquez   Will look into the CT chest- lung cancer screening   Take medications as instructed- refills today   Follow up as directed   If symptoms worsen or persist please seek further evaluation     1. Major depressive disorder, recurrent episode, moderate  Follow up with Veronica Vázquez NP as directed. Restart medications- dose adjustments     2. Generalized anxiety disorder  - QUEtiapine (SEROquel) 50 MG tablet; Take 1 tablet by mouth Every Night.  Dispense: 90 tablet; Refill: 1    3. Essential hypertension  - amLODIPine (NORVASC) 10 MG tablet; Take 1 tablet by mouth Daily.  Dispense: 90 tablet; Refill: 1  - metoprolol succinate XL (TOPROL-XL) 100 MG 24 hr tablet; Take 2 tablets by mouth Daily.  Dispense: 180 tablet; Refill: 1    4. Hyperlipidemia, unspecified hyperlipidemia type  - atorvastatin (LIPITOR) 20 MG tablet; Take 1 tablet by mouth Daily.  Dispense: 90 tablet;  Refill: 1  - clopidogrel (PLAVIX) 75 MG tablet; TAKE 1 TABLET BY MOUTH ONCE DAILY  Dispense: 90 tablet; Refill: 1    5. Alcohol induced insomnia  - QUEtiapine (SEROquel) 50 MG tablet; Take 1 tablet by mouth Every Night.  Dispense: 90 tablet; Refill: 1    6. PVD (peripheral vascular disease)  Follow up with Cardiology- will reach out     7. Right leg swelling  Follow up with Cardiology     Follow Up  Return if symptoms worsen or fail to improve, for Next scheduled follow up.    Mali Galeana MD  [facility] INTERNAL MEDICINE  [facility] [facility] [address] [phone]

## 2025-04-14 ENCOUNTER — LAB (OUTPATIENT)
Dept: LAB | Facility: HOSPITAL | Age: 80
End: 2025-04-14
Payer: MEDICARE

## 2025-04-14 ENCOUNTER — OFFICE VISIT (OUTPATIENT)
Dept: BEHAVIORAL HEALTH | Facility: CLINIC | Age: 80
End: 2025-04-14
Payer: MEDICARE

## 2025-04-14 VITALS
HEART RATE: 61 BPM | SYSTOLIC BLOOD PRESSURE: 126 MMHG | BODY MASS INDEX: 23.19 KG/M2 | OXYGEN SATURATION: 97 % | DIASTOLIC BLOOD PRESSURE: 72 MMHG | HEIGHT: 70 IN | WEIGHT: 162 LBS

## 2025-04-14 DIAGNOSIS — F33.1 MAJOR DEPRESSIVE DISORDER, RECURRENT EPISODE, MODERATE: Primary | ICD-10-CM

## 2025-04-14 DIAGNOSIS — R79.9 ABNORMAL FINDING OF BLOOD CHEMISTRY, UNSPECIFIED: ICD-10-CM

## 2025-04-14 DIAGNOSIS — E87.1 HYPONATREMIA: ICD-10-CM

## 2025-04-14 DIAGNOSIS — G62.9 NEUROPATHY: ICD-10-CM

## 2025-04-14 DIAGNOSIS — F10.10 ALCOHOL ABUSE: ICD-10-CM

## 2025-04-14 DIAGNOSIS — F41.1 GENERALIZED ANXIETY DISORDER: ICD-10-CM

## 2025-04-14 LAB
ALBUMIN SERPL-MCNC: 4.8 G/DL (ref 3.5–5.2)
ALBUMIN/GLOB SERPL: 2 G/DL
ALP SERPL-CCNC: 76 U/L (ref 39–117)
ALT SERPL W P-5'-P-CCNC: 12 U/L (ref 1–41)
ANION GAP SERPL CALCULATED.3IONS-SCNC: 16.4 MMOL/L (ref 5–15)
AST SERPL-CCNC: 18 U/L (ref 1–40)
BILIRUB SERPL-MCNC: 0.6 MG/DL (ref 0–1.2)
BUN SERPL-MCNC: 11 MG/DL (ref 8–23)
BUN/CREAT SERPL: 11.5 (ref 7–25)
CALCIUM SPEC-SCNC: 9.2 MG/DL (ref 8.6–10.5)
CHLORIDE SERPL-SCNC: 91 MMOL/L (ref 98–107)
CO2 SERPL-SCNC: 22.6 MMOL/L (ref 22–29)
CREAT SERPL-MCNC: 0.96 MG/DL (ref 0.76–1.27)
DEPRECATED RDW RBC AUTO: 41.7 FL (ref 37–54)
EGFRCR SERPLBLD CKD-EPI 2021: 80.9 ML/MIN/1.73
ERYTHROCYTE [DISTWIDTH] IN BLOOD BY AUTOMATED COUNT: 13.2 % (ref 12.3–15.4)
FERRITIN SERPL-MCNC: 240 NG/ML (ref 30–400)
FOLATE SERPL-MCNC: 8.25 NG/ML (ref 4.78–24.2)
GLOBULIN UR ELPH-MCNC: 2.4 GM/DL
GLUCOSE SERPL-MCNC: 87 MG/DL (ref 65–99)
HCT VFR BLD AUTO: 40.7 % (ref 37.5–51)
HGB BLD-MCNC: 14.6 G/DL (ref 13–17.7)
IRON 24H UR-MRATE: 175 MCG/DL (ref 59–158)
IRON SATN MFR SERPL: 36 % (ref 20–50)
MCH RBC QN AUTO: 31.2 PG (ref 26.6–33)
MCHC RBC AUTO-ENTMCNC: 35.9 G/DL (ref 31.5–35.7)
MCV RBC AUTO: 87 FL (ref 79–97)
PLATELET # BLD AUTO: 237 10*3/MM3 (ref 140–450)
PMV BLD AUTO: 8.9 FL (ref 6–12)
POTASSIUM SERPL-SCNC: 4.4 MMOL/L (ref 3.5–5.2)
PROT SERPL-MCNC: 7.2 G/DL (ref 6–8.5)
RBC # BLD AUTO: 4.68 10*6/MM3 (ref 4.14–5.8)
SODIUM SERPL-SCNC: 130 MMOL/L (ref 136–145)
TIBC SERPL-MCNC: 484 MCG/DL (ref 298–536)
TRANSFERRIN SERPL-MCNC: 325 MG/DL (ref 200–360)
VIT B12 BLD-MCNC: 279 PG/ML (ref 211–946)
WBC NRBC COR # BLD AUTO: 6.53 10*3/MM3 (ref 3.4–10.8)

## 2025-04-14 PROCEDURE — 83540 ASSAY OF IRON: CPT

## 2025-04-14 PROCEDURE — 82607 VITAMIN B-12: CPT

## 2025-04-14 PROCEDURE — 3078F DIAST BP <80 MM HG: CPT | Performed by: REGISTERED NURSE

## 2025-04-14 PROCEDURE — 3074F SYST BP LT 130 MM HG: CPT | Performed by: REGISTERED NURSE

## 2025-04-14 PROCEDURE — 96127 BRIEF EMOTIONAL/BEHAV ASSMT: CPT | Performed by: REGISTERED NURSE

## 2025-04-14 PROCEDURE — 85027 COMPLETE CBC AUTOMATED: CPT

## 2025-04-14 PROCEDURE — 82728 ASSAY OF FERRITIN: CPT

## 2025-04-14 PROCEDURE — 99214 OFFICE O/P EST MOD 30 MIN: CPT | Performed by: REGISTERED NURSE

## 2025-04-14 PROCEDURE — 82746 ASSAY OF FOLIC ACID SERUM: CPT

## 2025-04-14 PROCEDURE — 80053 COMPREHEN METABOLIC PANEL: CPT

## 2025-04-14 PROCEDURE — 84466 ASSAY OF TRANSFERRIN: CPT

## 2025-04-14 RX ORDER — SERTRALINE HYDROCHLORIDE 100 MG/1
TABLET, FILM COATED ORAL
Qty: 120 TABLET | Refills: 0 | Status: SHIPPED | OUTPATIENT
Start: 2025-04-14

## 2025-04-14 NOTE — PROGRESS NOTES
Follow Up Office Visit      Patient Name: Ernesto Vann  : 1945   MRN: [id number]     Referring Provider: Mali Galeana MD    Chief Complaint:      ICD-10-CM ICD-9-CM   1. Major depressive disorder, recurrent episode, moderate  F33.1 296.32   2. Generalized anxiety disorder  F41.1 300.02   3. Alcohol abuse  F10.10 305.00        History of Present Illness:   Ernesto Vann is a 79 y.o. male who is here today for follow up and medication management    Subjective      Patient Reports:   History of Present Illness  The patient presents for evaluation of depression and hyponatremia.    He has been without his sertraline medication for approximately 2 weeks, having exhausted his supply. He went to the pharmacy, but they informed him that he needed new refills. They said they would ask and apparently sent the request here. He reports that the medication was beneficial and expresses a desire to resume its use. 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 His sleep pattern is satisfactory, aided by nightly Seroquel prescribed by Dr. Rizo several months ago, resulting in an average of 10 hours of sleep per night. He feels well-rested during the day and maintains a good appetite. He reports no suicidal ideation or homicidal thoughts. He also does not experience any auditory or visual hallucinations. He has successfully ceased smoking and is making efforts to reduce his alcohol consumption, limiting himself to 2 glasses of red wine per evening, although he occasionally exceeds this limit.    He has a history of hyponatremia, which has persisted for several years, necessitating frequent testing. He acknowledges inadequate water intake, preferring to consume 2 cups of coffee in the morning and Coke Zero throughout the afternoon. He has attempted to supplement his diet with Gatorade and other  electrolytes.    The patient quit smoking. He drinks red wine and is trying to limit it to 2 glasses per evening, often consuming 3 glasses. He does not drink beer, vodka, or rum anymore.      Review of Systems:   Review of Systems   Constitutional:  Negative for unexpected weight change.   Eyes:  Negative for visual disturbance.   Respiratory:  Negative for chest tightness and shortness of breath.    Cardiovascular:  Negative for chest pain.   Musculoskeletal:  Negative for gait problem.   Skin:  Negative for rash and wound.   Neurological:  Negative for dizziness, tremors, seizures, weakness and light-headedness.   Psychiatric/Behavioral:  Positive for dysphoric mood. Negative for agitation, behavioral problems, confusion, hallucinations, self-injury and suicidal ideas. The patient is not nervous/anxious and is not hyperactive.      Sleep pattern: 10 hours, well rested  Appetite: increased     PHQ-9 Depression Screening  Little interest or pleasure in doing things? Several days   Feeling down, depressed, or hopeless? Several days   PHQ-2 Total Score 2   Trouble falling or staying asleep, or sleeping too much? Not at all   Feeling tired or having little energy? Several days   Poor appetite or overeating? Not at all   Feeling bad about yourself - or that you are a failure or have let yourself or your family down? Several days   Trouble concentrating on things, such as reading the newspaper or watching television? Not at all   Moving or speaking so slowly that other people could have noticed? Or the opposite - being so fidgety or restless that you have been moving around a lot more than usual? Not at all   Thoughts that you would be better off dead, or of hurting yourself in some way? Not at all   PHQ-9 Total Score 4   If you checked off any problems, how difficult have these problems made it for you to do your work, take care of things at home, or get along with other people? Not difficult at all       CRIS-7 Anxiety  Screening  Over the last two weeks, how often have you been bothered by the following problems?  Feeling nervous, anxious or on edge: Several days  Not being able to stop or control worrying: Not at all  Worrying too much about different things: Not at all  Trouble Relaxing: Not at all  Being so restless that it is hard to sit still: Not at all  Becoming easily annoyed or irritable: Not at all  Feeling afraid as if something awful might happen: Not at all  CRIS 7 Total Score: 1  If you checked any problems, how difficult have these problems made it for you to do your work, take care of things at home, or get along with other people: Not difficult at all    RISK ASSESSMENT:  Patient denies any thoughts or intent of suicide today. Patient denies any impulsive behavior today.     Medications:     Current Outpatient Medications:     Acetaminophen 500 MG capsule, Take 2 capsules by mouth Every 6 (Six) Hours., Disp: , Rfl:     amLODIPine (NORVASC) 10 MG tablet, Take 1 tablet by mouth Daily., Disp: 90 tablet, Rfl: 1    atorvastatin (LIPITOR) 20 MG tablet, Take 1 tablet by mouth Daily., Disp: 90 tablet, Rfl: 1    clopidogrel (PLAVIX) 75 MG tablet, TAKE 1 TABLET BY MOUTH ONCE DAILY, Disp: 90 tablet, Rfl: 1    metoprolol succinate XL (TOPROL-XL) 100 MG 24 hr tablet, Take 2 tablets by mouth Daily., Disp: 180 tablet, Rfl: 1    QUEtiapine (SEROquel) 50 MG tablet, Take 1 tablet by mouth Every Night., Disp: 90 tablet, Rfl: 1    sertraline (Zoloft) 100 MG tablet, Take 1 tablet by mouth Daily., Disp: 90 tablet, Rfl: 0    solifenacin (VESICARE) 10 MG tablet, Take 1 tablet by mouth Daily., Disp: , Rfl:     Medication Considerations:  ASIA reviewed and appropriate.      Allergies:   Allergies   Allergen Reactions    Diclofenac GI Bleeding and Unknown (See Comments)    Tofranil [Imipramine Hcl] Other (See Comments)     Syncope, falls    Lisinopril Cough       Results Reviewed:   Results  Discussed recent sodium levels and the need for  "follow up testing which patient will complete today    Objective     Physical Exam:  Vital Signs:   Vitals:    04/14/25 1040 04/14/25 1045   BP:  126/72   Pulse:  61   SpO2:  97%   Weight: 73.5 kg (162 lb)    Height: 177.8 cm (70\")      Body mass index is 23.24 kg/m².     Mental Status Exam:   Hygiene:   good   Cooperation:  Cooperative  Eye Contact:  Good  Psychomotor Behavior:  Appropriate  Affect:  Appropriate  Mood: normal  Speech:  Normal  Thought Process:  Goal directed and Linear  Thought Content:  Normal  Suicidal:  None  Homicidal:  None  Hallucinations:  None  Delusion:  None  Memory:  Intact  Orientation:  Person, Place, Time, and Situation  Reliability:  good  Insight:  Good  Judgement:  Good  Impulse Control:  Good  Physical/Medical Issues:   see problem list      Assessment / Plan      Visit Diagnosis/Orders Placed This Visit:  Diagnoses and all orders for this visit:    1. Major depressive disorder, recurrent episode, moderate (Primary)    2. Generalized anxiety disorder    3. Alcohol abuse             Functional Status: No impairment    Prognosis: Good with Ongoing Treatment     Impression/Formulation:  Patient appeared alert and oriented.  Patient is voluntarily requesting to continue outpatient psychiatric treatment at [facility].  Patient is receptive to assistance with maintaining a stable lifestyle.  Patient presents with history of     ICD-10-CM ICD-9-CM   1. Major depressive disorder, recurrent episode, moderate  F33.1 296.32   2. Generalized anxiety disorder  F41.1 300.02   3. Alcohol abuse  F10.10 305.00   .    Reviewed patient's previous provider notes. Reviewed most recent labs. Patient meets DSM V diagnostic criteria for diagnoses. Diagnoses may be updated as more information becomes available.     Assessment & Plan  1. Depression.  He has been off sertraline for about 2 weeks and wishes to restart it and increase it higher than the original dose. He is currently " taking Seroquel for sleep, which he reports is effective. The potential interaction between alcohol and Seroquel, which could enhance the sedative effect, was discussed. A gradual increase in sertraline dosage will be implemented: 50 mg for 2 weeks, then 100 mg for 2 weeks, and finally 150 mg until the next consultation. The prescription will be sent to [facility]. He is advised to report any adverse effects, particularly gastrointestinal issues or headaches, which are common when restarting antidepressants.    2. Hyponatremia.  His sodium levels have been consistently low for several years. The potential impact of sertraline on sodium levels was discussed. He is advised to consider using electrolyte supplements to help manage his sodium levels. Labs have been ordered by his PCP to monitor his sodium levels which he plans to obtain today.    Follow-up  The patient will follow up in 3 months or sooner if needed.    Treatment Plan:   Resume sertraline 50 mg daily for two weeks and then 100 mg daily for two weeks, then increase to 150 mg daily      Patient will continue supportive psychotherapy efforts and medications as indicated. Clinic will obtain release of information for current treatment team for continuity of care as needed. Patient will contact this office, call 911 or present to the nearest emergency room should suicidal or homicidal ideations occur.  Discussed medication options and treatment plan of prescribed medication(s) as well as the risks, benefits, and potential side effects. Patient acknowledged and verbally consented to continue with current treatment plan and was educated on the importance of compliance with treatment and follow-up appointments.     Patient instructions:  Instructed will take 4-6 weeks for full therapeutic effect. Medication risks and side effects discussed with patient including risk for worsening mood, changes in behavior, thoughts of suicide or homicide,  induction of laina, serotonin syndrome, hyponatremia, rare SIADH, withdrawal symptoms if abrupt withdrawal, sexual dysfunction.   If any thoughts of SI or HI, worsening mood or changes in behavior, call 911 or crisis line [phone], or go to nearest ER at once. Pt.verbalizes understanding and consents to treatment with this medication.     Follow Up:   Return in about 3 months (around 7/14/2025) for Med Check.    Patient or patient representative verbalized consent for the use of Ambient Listening during the visit with  LUCY Newberry for chart documentation. 4/14/2025  11:19 EDT    LUCY Newberry, PMP-BC [facility]

## 2025-05-05 ENCOUNTER — PREP FOR SURGERY (OUTPATIENT)
Dept: OTHER | Facility: HOSPITAL | Age: 80
End: 2025-05-05
Payer: MEDICARE

## 2025-05-05 ENCOUNTER — OFFICE VISIT (OUTPATIENT)
Dept: ORTHOPEDIC SURGERY | Facility: CLINIC | Age: 80
End: 2025-05-05
Payer: MEDICARE

## 2025-05-05 ENCOUNTER — TELEPHONE (OUTPATIENT)
Dept: INTERNAL MEDICINE | Facility: CLINIC | Age: 80
End: 2025-05-05
Payer: MEDICARE

## 2025-05-05 VITALS
DIASTOLIC BLOOD PRESSURE: 70 MMHG | HEIGHT: 70 IN | WEIGHT: 162 LBS | BODY MASS INDEX: 23.19 KG/M2 | SYSTOLIC BLOOD PRESSURE: 134 MMHG

## 2025-05-05 DIAGNOSIS — S82.892A CLOSED FRACTURE OF LEFT ANKLE, INITIAL ENCOUNTER: Primary | ICD-10-CM

## 2025-05-05 PROCEDURE — 3078F DIAST BP <80 MM HG: CPT | Performed by: ORTHOPAEDIC SURGERY

## 2025-05-05 PROCEDURE — 99204 OFFICE O/P NEW MOD 45 MIN: CPT | Performed by: ORTHOPAEDIC SURGERY

## 2025-05-05 PROCEDURE — 1160F RVW MEDS BY RX/DR IN RCRD: CPT | Performed by: ORTHOPAEDIC SURGERY

## 2025-05-05 PROCEDURE — 1159F MED LIST DOCD IN RCRD: CPT | Performed by: ORTHOPAEDIC SURGERY

## 2025-05-05 PROCEDURE — 3075F SYST BP GE 130 - 139MM HG: CPT | Performed by: ORTHOPAEDIC SURGERY

## 2025-05-05 NOTE — PROGRESS NOTES
[facility] [place] Orthopaedic Surgery Office Visit     Office Visit       Date: 05/05/2025   Patient Name: Ernesto Vann  MRN: [id number]  YOB: 1945    Referring Physician: No ref. provider found     Chief Complaint:   Chief Complaint   Patient presents with    Left Ankle - Pain       History of Present Illness: Ernesto Vann is a 79 y.o. male who is here today left ankle fracture.  Seen today previously at urgent care and he came to my office for further management.  Was placed in tall cam walking boot at urgent care.  Suffered mechanical fall about 2 weeks ago injuring left ankle.  Has been walking with assistive devices in shoe with continued pain and swelling since that time.  Of note patient has peripheral artery disease with stent right lower extremity.  Is currently following closely with cardiology as well as vascular surgery.  Currently  taking Plavix for history of stent.    Subjective   Review of Systems: Review of Systems   Constitutional: Negative.    HENT: Negative.     Eyes: Negative.    Respiratory: Negative.     Cardiovascular: Negative.    Gastrointestinal: Negative.    Endocrine: Negative.    Genitourinary: Negative.    Musculoskeletal:  Positive for arthralgias.   Skin: Negative.    Allergic/Immunologic: Negative.    Neurological: Negative.    Hematological: Negative.    Psychiatric/Behavioral: Negative.          Past Medical History:   Past Medical History:   Diagnosis Date    Alcohol abuse     Allergic     Anxiety     Benign colon polyp     Cancer     PROSTATE CANCER    Cataract     Chronic obstructive pulmonary disease 06/17/2016    COPD (chronic obstructive pulmonary disease)     DDD (degenerative disc disease), lumbar     Depression     Diabetes mellitus     Dyslipidemia     Elevated PSA 12/2018    GERD (gastroesophageal reflux disease)     Hyperlipidemia     Hypertension     Inguinal hernia     Irritable bowel syndrome with both  constipation and diarrhea 5/15/2024    PAD (peripheral artery disease)     Prostate cancer     SAH (subarachnoid hemorrhage) 2021    Tobacco abuse        Past Surgical History:   Past Surgical History:   Procedure Laterality Date    BELPHAROPTOSIS REPAIR Bilateral 2019    COLONOSCOPY  2019    EYE SURGERY      cataract    FEMORAL ARTERY - FEMORAL ARTERY BYPASS GRAFT      FEMORAL ARTERY STENT      INGUINAL HERNIA REPAIR Right     LUMBAR SYMPATHETIC NERVE BLOCK      PROSTATECTOMY N/A 2016    Procedure: PROSTATECTOMY LAPAROSCOPIC WITH DAVINCI ROBOT WITH NODES;  Surgeon: Victorino Emerson Jr., MD;  Location: [facility];  Service:     TONSILLECTOMY      TONSILLECTOMY AND ADENOIDECTOMY      VASECTOMY         Family History:   Family History   Problem Relation Age of Onset    Cancer Mother     Hypertension Mother     Dementia Mother     Heart disease Father     Heart attack Father     Breast cancer Sister     Brain cancer Sister     Lung cancer Sister     Dementia Maternal Grandmother        Social History:   Social History     Socioeconomic History    Marital status:     Number of children: 1   Tobacco Use    Smoking status: Former     Current packs/day: 0.00     Average packs/day: 1 pack/day for 30.0 years (30.0 ttl pk-yrs)     Types: Cigarettes     Start date: 2000     Quit date: 2023     Years since quittin.7     Passive exposure: Past    Smokeless tobacco: Never    Tobacco comments:     HAS SMOKED 1PPD IN THE PAST   Vaping Use    Vaping status: Never Used   Substance and Sexual Activity    Alcohol use: Not Currently     Alcohol/week: 30.0 - 35.0 standard drinks of alcohol     Types: 30 - 35 Glasses of wine per week     Comment: admits to 6 daily    Drug use: No    Sexual activity: Defer       Medications:   Current Outpatient Medications:     Acetaminophen 500 MG capsule, Take 2 capsules by mouth Every 6 (Six) Hours., Disp: , Rfl:     amLODIPine (NORVASC) 10 MG tablet, Take 1  "tablet by mouth Daily., Disp: 90 tablet, Rfl: 1    atorvastatin (LIPITOR) 20 MG tablet, Take 1 tablet by mouth Daily., Disp: 90 tablet, Rfl: 1    clopidogrel (PLAVIX) 75 MG tablet, TAKE 1 TABLET BY MOUTH ONCE DAILY, Disp: 90 tablet, Rfl: 1    metoprolol succinate XL (TOPROL-XL) 100 MG 24 hr tablet, Take 2 tablets by mouth Daily., Disp: 180 tablet, Rfl: 1    QUEtiapine (SEROquel) 50 MG tablet, Take 1 tablet by mouth Every Night., Disp: 90 tablet, Rfl: 1    sertraline (Zoloft) 100 MG tablet, Take one 50 mg tablet by mouth daily for two weeks, then take one 100 mg tablet by mouth daily for two weeks, after four weeks, increase to one 50 mg tablet by mouth daily along with one 100 mg tablet by mouth daily to equal 150 mg by mouth daily, Disp: 120 tablet, Rfl: 0    sertraline (Zoloft) 50 MG tablet, Take one 50 mg tablet by mouth daily for two weeks, then take one 100 mg tablet by mouth daily for two weeks, after four weeks, increase to one 50 mg tablet by mouth daily along with one 100 mg tablet by mouth daily to equal 150 mg by mouth daily, Disp: 120 tablet, Rfl: 0    solifenacin (VESICARE) 10 MG tablet, Take 1 tablet by mouth Daily., Disp: , Rfl:     Allergies:   Allergies   Allergen Reactions    Diclofenac GI Bleeding and Unknown (See Comments)    Tofranil [Imipramine Hcl] Other (See Comments)     Syncope, falls    Lisinopril Cough       I reviewed the patient's chief complaint, history of present illness, review of systems, past medical history, surgical history, family history, social history, medications and allergy list.     Objective    Vital Signs:   Vitals:    05/05/25 1345   BP: 134/70   Weight: 73.5 kg (162 lb)   Height: 177.8 cm (70\")     Body mass index is 23.24 kg/m².    Ortho Exam:  left LE Foot and Ankle Exam:   Neurological exam of the superficial peroneal, deep peroneal, plantar, sural and saphenous nerves demonstrates intact sensation.   10/10 sites felt on monofilament testing of foot.   Able to flex " and extend toes, can dorsiflex and plantarflex ankle however motion limited 2/2 to pain.  Peripheral pulses including posterior tibial artery and deep peroneal artery are intact and palpable. There is good perfusion to the toes.   The skin is intact throughout the foot and ankle without ulceration.   There is tenderness to palpation about the ankle, worst laterally. No tenderness to palpation over the proximal fibula, visible swelling about the ankle.     Results Review:   XR FOOT 3+ VW LEFT, XR ANKLE 3+ VW LEFT     Date of Exam: 5/5/2025 12:03 PM EDT     Indication: ankle pain     Comparison: None available.     Findings:  Evaluation of the left ankle demonstrates a distal oblique fibular fracture which appears mildly displaced. There is a minimally displaced transverse fracture through the medial malleolus with some disruption along the medial cortex. Subtle cortical   step-off is noted and a posterior malleolar fracture cannot be entirely excluded. The talar dome is intact. There is anterior and medial soft tissue swelling.     Evaluation of the foot demonstrates a healed fracture along the lateral base of the fifth metatarsal. There is joint space narrowing at the first MTP joint. Bones appear osteopenic. No acute osseous injury.     IMPRESSION:  Impression:     1. Oblique distal fibular fracture and minimally displaced transverse fractures of the medial malleolus. There is a subtle but suspected posterior malleolar fracture as well.     2. No acute osseous injury in the left foot.        Electronically Signed: Gabriela Holt MD    5/5/2025 12:39 PM EDT    Workstation ID: [id number]    05/05/25 I have personally reviewed and interpreted the images from outside facility with the documented findings,  medial and lateral malleoli are fractures      Assessment / Plan    Assessment/Plan:   Diagnoses and all orders for this visit:    1. Closed fracture of left ankle, initial encounter (Primary)  -     Lightweight  Wheelchair      Discussed fracture with patient as well as treatment options at length. Also reviewed external note and imaging studies from earlier today at urgent care. Patient has a fracture (an injury with risk of long term functional impairment). I explained fractures of joints to the patient.  I explained that all joint fractures will develop some arthritis. The goal of treatment is to put the joint back in  place as anatomically as possible, and hold it there to heal. This helps to slow down the progression of post-traumatic arthritis.  We cannot eliminate it.  I explained that fractures have stable and unstable patterns.  I think this fracture is too unstable to hold in a cast, I think the risk of malunion and non-union are too high if we use cast treatment. I am recommending surgical fixation.  I explained the surgery and plan for fixation. I explained that most hardware is designed to stay in permanently however rarely becomes bothersome and can be removed. Questions were asked and answered in detail.  Will plan to communicate with patient's cardiologist for clearance as well as parameters with management of perioperative Plavix.  Will plan for patient to spend the night in the hospital following his surgery.  Patient currently in tall cam walking boot.  Plan for him to be nonweightbearing in walking boot until surgery.  Provided with prescription for wheelchair.    Plan for surgery will be left ankle ORIF, possible transcends kellee fixation, all other indicated procedures.     The risks and benefits of the procedure were discussed with the patient and or appropriate guardian, which include but are not limited to the risk of bleeding, infection, neurovascular damage, post-operative stiffness, tendon and/or ligament retears, recurrent instability, continued pain, arthritic pain, need for further revision surgeries in the future, deep venous thrombosis, and general risks from anesthesia. We also discussed  the post-operative rehabilitation, the need for physical therapy, and the overall expected outcomes from the procedure. We allowed proper time and answered the patient's questions regarding the procedure. Knowing what the risks are and what the conservative treatment is, the patient elected to forgo any further conservative treatment options and proceed with the surgical intervention.    The patient has a mobility limitation secondary to his ankle fracture that impairs her ability to participate in 1 or more mobility related activities of daily living such as ambulation in customary locations in her home.  The patient's mobility limitation cannot be sufficiently resolved by use of an appropriately fitted cane or walker.  The patient's home provides adequate access between rooms, maneuvering space, and surfaces for the use of the manual wheelchair that is provided.  Use of manual wheelchair will significantly improve the patient's ability to participate in MRADLs and the patient will use it on a regular basis in the home.  The patient has not expressed an unwillingness to use the manual wheelchair that is provided in the home. The patient has sufficient upper extremity function and other physical and mental capabilities needed to safely self propel the manual wheelchair that is provided in the home during a typical day.  The patient cannot self propel in a standard wheelchair in the home and the patient can and does self propel in a lightweight wheelchair.         Follow Up:   Return in about 2 weeks for F/U after Surgery.      Oliver Trejo MD  [facility] [place], [place] Orthopedic Surgeon

## 2025-05-05 NOTE — H&P (VIEW-ONLY)
[facility] [place] Orthopaedic Surgery Office Visit     Office Visit       Date: 05/05/2025   Patient Name: Ernesto Vann  MRN: [id number]  YOB: 1945    Referring Physician: No ref. provider found     Chief Complaint:   Chief Complaint   Patient presents with    Left Ankle - Pain       History of Present Illness: Ernesto Vann is a 79 y.o. male who is here today left ankle fracture.  Seen today previously at urgent care and he came to my office for further management.  Was placed in tall cam walking boot at urgent care.  Suffered mechanical fall about 2 weeks ago injuring left ankle.  Has been walking with assistive devices in shoe with continued pain and swelling since that time.  Of note patient has peripheral artery disease with stent right lower extremity.  Is currently following closely with cardiology as well as vascular surgery.  Currently  taking Plavix for history of stent.    Subjective   Review of Systems: Review of Systems   Constitutional: Negative.    HENT: Negative.     Eyes: Negative.    Respiratory: Negative.     Cardiovascular: Negative.    Gastrointestinal: Negative.    Endocrine: Negative.    Genitourinary: Negative.    Musculoskeletal:  Positive for arthralgias.   Skin: Negative.    Allergic/Immunologic: Negative.    Neurological: Negative.    Hematological: Negative.    Psychiatric/Behavioral: Negative.          Past Medical History:   Past Medical History:   Diagnosis Date    Alcohol abuse     Allergic     Anxiety     Benign colon polyp     Cancer     PROSTATE CANCER    Cataract     Chronic obstructive pulmonary disease 06/17/2016    COPD (chronic obstructive pulmonary disease)     DDD (degenerative disc disease), lumbar     Depression     Diabetes mellitus     Dyslipidemia     Elevated PSA 12/2018    GERD (gastroesophageal reflux disease)     Hyperlipidemia     Hypertension     Inguinal hernia     Irritable bowel syndrome with both  constipation and diarrhea 5/15/2024    PAD (peripheral artery disease)     Prostate cancer     SAH (subarachnoid hemorrhage) 2021    Tobacco abuse        Past Surgical History:   Past Surgical History:   Procedure Laterality Date    BELPHAROPTOSIS REPAIR Bilateral 2019    COLONOSCOPY  2019    EYE SURGERY      cataract    FEMORAL ARTERY - FEMORAL ARTERY BYPASS GRAFT      FEMORAL ARTERY STENT      INGUINAL HERNIA REPAIR Right     LUMBAR SYMPATHETIC NERVE BLOCK      PROSTATECTOMY N/A 2016    Procedure: PROSTATECTOMY LAPAROSCOPIC WITH DAVINCI ROBOT WITH NODES;  Surgeon: Victorino Emerson Jr., MD;  Location: [facility];  Service:     TONSILLECTOMY      TONSILLECTOMY AND ADENOIDECTOMY      VASECTOMY         Family History:   Family History   Problem Relation Age of Onset    Cancer Mother     Hypertension Mother     Dementia Mother     Heart disease Father     Heart attack Father     Breast cancer Sister     Brain cancer Sister     Lung cancer Sister     Dementia Maternal Grandmother        Social History:   Social History     Socioeconomic History    Marital status:     Number of children: 1   Tobacco Use    Smoking status: Former     Current packs/day: 0.00     Average packs/day: 1 pack/day for 30.0 years (30.0 ttl pk-yrs)     Types: Cigarettes     Start date: 2000     Quit date: 2023     Years since quittin.7     Passive exposure: Past    Smokeless tobacco: Never    Tobacco comments:     HAS SMOKED 1PPD IN THE PAST   Vaping Use    Vaping status: Never Used   Substance and Sexual Activity    Alcohol use: Not Currently     Alcohol/week: 30.0 - 35.0 standard drinks of alcohol     Types: 30 - 35 Glasses of wine per week     Comment: admits to 6 daily    Drug use: No    Sexual activity: Defer       Medications:   Current Outpatient Medications:     Acetaminophen 500 MG capsule, Take 2 capsules by mouth Every 6 (Six) Hours., Disp: , Rfl:     amLODIPine (NORVASC) 10 MG tablet, Take 1  "tablet by mouth Daily., Disp: 90 tablet, Rfl: 1    atorvastatin (LIPITOR) 20 MG tablet, Take 1 tablet by mouth Daily., Disp: 90 tablet, Rfl: 1    clopidogrel (PLAVIX) 75 MG tablet, TAKE 1 TABLET BY MOUTH ONCE DAILY, Disp: 90 tablet, Rfl: 1    metoprolol succinate XL (TOPROL-XL) 100 MG 24 hr tablet, Take 2 tablets by mouth Daily., Disp: 180 tablet, Rfl: 1    QUEtiapine (SEROquel) 50 MG tablet, Take 1 tablet by mouth Every Night., Disp: 90 tablet, Rfl: 1    sertraline (Zoloft) 100 MG tablet, Take one 50 mg tablet by mouth daily for two weeks, then take one 100 mg tablet by mouth daily for two weeks, after four weeks, increase to one 50 mg tablet by mouth daily along with one 100 mg tablet by mouth daily to equal 150 mg by mouth daily, Disp: 120 tablet, Rfl: 0    sertraline (Zoloft) 50 MG tablet, Take one 50 mg tablet by mouth daily for two weeks, then take one 100 mg tablet by mouth daily for two weeks, after four weeks, increase to one 50 mg tablet by mouth daily along with one 100 mg tablet by mouth daily to equal 150 mg by mouth daily, Disp: 120 tablet, Rfl: 0    solifenacin (VESICARE) 10 MG tablet, Take 1 tablet by mouth Daily., Disp: , Rfl:     Allergies:   Allergies   Allergen Reactions    Diclofenac GI Bleeding and Unknown (See Comments)    Tofranil [Imipramine Hcl] Other (See Comments)     Syncope, falls    Lisinopril Cough       I reviewed the patient's chief complaint, history of present illness, review of systems, past medical history, surgical history, family history, social history, medications and allergy list.     Objective    Vital Signs:   Vitals:    05/05/25 1345   BP: 134/70   Weight: 73.5 kg (162 lb)   Height: 177.8 cm (70\")     Body mass index is 23.24 kg/m².    Ortho Exam:  left LE Foot and Ankle Exam:   Neurological exam of the superficial peroneal, deep peroneal, plantar, sural and saphenous nerves demonstrates intact sensation.   10/10 sites felt on monofilament testing of foot.   Able to flex " and extend toes, can dorsiflex and plantarflex ankle however motion limited 2/2 to pain.  Peripheral pulses including posterior tibial artery and deep peroneal artery are intact and palpable. There is good perfusion to the toes.   The skin is intact throughout the foot and ankle without ulceration.   There is tenderness to palpation about the ankle, worst laterally. No tenderness to palpation over the proximal fibula, visible swelling about the ankle.     Results Review:   XR FOOT 3+ VW LEFT, XR ANKLE 3+ VW LEFT     Date of Exam: 5/5/2025 12:03 PM EDT     Indication: ankle pain     Comparison: None available.     Findings:  Evaluation of the left ankle demonstrates a distal oblique fibular fracture which appears mildly displaced. There is a minimally displaced transverse fracture through the medial malleolus with some disruption along the medial cortex. Subtle cortical   step-off is noted and a posterior malleolar fracture cannot be entirely excluded. The talar dome is intact. There is anterior and medial soft tissue swelling.     Evaluation of the foot demonstrates a healed fracture along the lateral base of the fifth metatarsal. There is joint space narrowing at the first MTP joint. Bones appear osteopenic. No acute osseous injury.     IMPRESSION:  Impression:     1. Oblique distal fibular fracture and minimally displaced transverse fractures of the medial malleolus. There is a subtle but suspected posterior malleolar fracture as well.     2. No acute osseous injury in the left foot.        Electronically Signed: Gabriela Holt MD    5/5/2025 12:39 PM EDT    Workstation ID: [id number]    05/05/25 I have personally reviewed and interpreted the images from outside facility with the documented findings,  medial and lateral malleoli are fractures      Assessment / Plan    Assessment/Plan:   Diagnoses and all orders for this visit:    1. Closed fracture of left ankle, initial encounter (Primary)  -     Lightweight  Wheelchair      Discussed fracture with patient as well as treatment options at length. Also reviewed external note and imaging studies from earlier today at urgent care. Patient has a fracture (an injury with risk of long term functional impairment). I explained fractures of joints to the patient.  I explained that all joint fractures will develop some arthritis. The goal of treatment is to put the joint back in  place as anatomically as possible, and hold it there to heal. This helps to slow down the progression of post-traumatic arthritis.  We cannot eliminate it.  I explained that fractures have stable and unstable patterns.  I think this fracture is too unstable to hold in a cast, I think the risk of malunion and non-union are too high if we use cast treatment. I am recommending surgical fixation.  I explained the surgery and plan for fixation. I explained that most hardware is designed to stay in permanently however rarely becomes bothersome and can be removed. Questions were asked and answered in detail.  Will plan to communicate with patient's cardiologist for clearance as well as parameters with management of perioperative Plavix.  Will plan for patient to spend the night in the hospital following his surgery.  Patient currently in tall cam walking boot.  Plan for him to be nonweightbearing in walking boot until surgery.  Provided with prescription for wheelchair.    Plan for surgery will be left ankle ORIF, possible transcends kellee fixation, all other indicated procedures.     The risks and benefits of the procedure were discussed with the patient and or appropriate guardian, which include but are not limited to the risk of bleeding, infection, neurovascular damage, post-operative stiffness, tendon and/or ligament retears, recurrent instability, continued pain, arthritic pain, need for further revision surgeries in the future, deep venous thrombosis, and general risks from anesthesia. We also discussed  the post-operative rehabilitation, the need for physical therapy, and the overall expected outcomes from the procedure. We allowed proper time and answered the patient's questions regarding the procedure. Knowing what the risks are and what the conservative treatment is, the patient elected to forgo any further conservative treatment options and proceed with the surgical intervention.    The patient has a mobility limitation secondary to his ankle fracture that impairs her ability to participate in 1 or more mobility related activities of daily living such as ambulation in customary locations in her home.  The patient's mobility limitation cannot be sufficiently resolved by use of an appropriately fitted cane or walker.  The patient's home provides adequate access between rooms, maneuvering space, and surfaces for the use of the manual wheelchair that is provided.  Use of manual wheelchair will significantly improve the patient's ability to participate in MRADLs and the patient will use it on a regular basis in the home.  The patient has not expressed an unwillingness to use the manual wheelchair that is provided in the home. The patient has sufficient upper extremity function and other physical and mental capabilities needed to safely self propel the manual wheelchair that is provided in the home during a typical day.  The patient cannot self propel in a standard wheelchair in the home and the patient can and does self propel in a lightweight wheelchair.         Follow Up:   Return in about 2 weeks for F/U after Surgery.      Oliver Trejo MD  [facility] [place] Orthopedic Surgeon

## 2025-05-05 NOTE — TELEPHONE ENCOUNTER
Pt called to let PCP know that he had hurt his left ankle about a week ago and he was still having pain, issues walking. Pt concerned he may have broken it. Pt advised to go to [facility] to get xray. Pt stated he would do so. Pt wanted PCP to be advised he was doing so.

## 2025-05-06 ENCOUNTER — TELEPHONE (OUTPATIENT)
Dept: ORTHOPEDIC SURGERY | Facility: CLINIC | Age: 80
End: 2025-05-06
Payer: MEDICARE

## 2025-05-06 ENCOUNTER — PATIENT ROUNDING (BHMG ONLY) (OUTPATIENT)
Dept: URGENT CARE | Facility: CLINIC | Age: 80
End: 2025-05-06
Payer: MEDICARE

## 2025-05-06 NOTE — TELEPHONE ENCOUNTER
Caller: Merary Vann    Relationship to patient: Emergency Contact    Best call back number: [phone]*    Patient is needing: PT IS STATING THAT [facility] IS NEEDING MORE INFO IN ORDER FOR THE PT TO USE INSURANCE FOR THE WHEEL CHAIR.. (A RX, RECENT OFFICE NOTES, AND A DEMOGRAPHIC SHEET).. FAX NUMBER [phone].. PLEASE ADVISE..

## 2025-05-07 ENCOUNTER — TELEPHONE (OUTPATIENT)
Dept: CARDIOLOGY | Facility: CLINIC | Age: 80
End: 2025-05-07
Payer: MEDICARE

## 2025-05-07 NOTE — TELEPHONE ENCOUNTER
Dr. Trejo is requesting cardiac clearance for patient to have ankle ORIF. Patient was previously seeing Dr. France but will be following up with Dr. Valiente. Patient currently taking Plavix for s/p peripheral vascular disease with intervention with right right femoral to popliteal bypass with vein. Is it ok to hold plavix and proceed with procedure? Thank you        Fax: [phone]

## 2025-05-08 ENCOUNTER — ANESTHESIA EVENT (OUTPATIENT)
Dept: PERIOP | Facility: HOSPITAL | Age: 80
End: 2025-05-08
Payer: MEDICARE

## 2025-05-08 RX ORDER — FAMOTIDINE 10 MG/ML
20 INJECTION, SOLUTION INTRAVENOUS ONCE
Status: CANCELLED | OUTPATIENT
Start: 2025-05-08 | End: 2025-05-08

## 2025-05-09 ENCOUNTER — TELEPHONE (OUTPATIENT)
Dept: ORTHOPEDIC SURGERY | Facility: CLINIC | Age: 80
End: 2025-05-09
Payer: MEDICARE

## 2025-05-09 ENCOUNTER — APPOINTMENT (OUTPATIENT)
Dept: GENERAL RADIOLOGY | Facility: HOSPITAL | Age: 80
End: 2025-05-09
Payer: MEDICARE

## 2025-05-09 ENCOUNTER — ANESTHESIA EVENT CONVERTED (OUTPATIENT)
Dept: ANESTHESIOLOGY | Facility: HOSPITAL | Age: 80
End: 2025-05-09
Payer: MEDICARE

## 2025-05-09 ENCOUNTER — ANESTHESIA (OUTPATIENT)
Dept: PERIOP | Facility: HOSPITAL | Age: 80
End: 2025-05-09
Payer: MEDICARE

## 2025-05-09 ENCOUNTER — HOSPITAL ENCOUNTER (OUTPATIENT)
Facility: HOSPITAL | Age: 80
Discharge: HOME | End: 2025-05-10
Attending: ORTHOPAEDIC SURGERY | Admitting: ORTHOPAEDIC SURGERY
Payer: MEDICARE

## 2025-05-09 DIAGNOSIS — Z98.890 S/P ORIF (OPEN REDUCTION INTERNAL FIXATION) FRACTURE: Primary | ICD-10-CM

## 2025-05-09 DIAGNOSIS — S82.892A CLOSED FRACTURE OF LEFT ANKLE, INITIAL ENCOUNTER: ICD-10-CM

## 2025-05-09 DIAGNOSIS — Z87.81 S/P ORIF (OPEN REDUCTION INTERNAL FIXATION) FRACTURE: Primary | ICD-10-CM

## 2025-05-09 PROBLEM — S82.899A ANKLE FRACTURE: Status: ACTIVE | Noted: 2025-05-09

## 2025-05-09 LAB
GLUCOSE BLDC GLUCOMTR-MCNC: 121 MG/DL (ref 70–130)
GLUCOSE BLDC GLUCOMTR-MCNC: 132 MG/DL (ref 70–130)

## 2025-05-09 PROCEDURE — 25010000002 GLYCOPYRROLATE 1 MG/5ML SOLUTION: Performed by: NURSE ANESTHETIST, CERTIFIED REGISTERED

## 2025-05-09 PROCEDURE — 25010000002 PROPOFOL 10 MG/ML EMULSION: Performed by: NURSE ANESTHETIST, CERTIFIED REGISTERED

## 2025-05-09 PROCEDURE — C1713 ANCHOR/SCREW BN/BN,TIS/BN: HCPCS | Performed by: ORTHOPAEDIC SURGERY

## 2025-05-09 PROCEDURE — 25010000002 LIDOCAINE PF 1% 1 % SOLUTION: Performed by: ANESTHESIOLOGY

## 2025-05-09 PROCEDURE — 25010000002 LIDOCAINE PF 1% 1 % SOLUTION: Performed by: NURSE ANESTHETIST, CERTIFIED REGISTERED

## 2025-05-09 PROCEDURE — 25010000002 CEFAZOLIN PER 500 MG: Performed by: ORTHOPAEDIC SURGERY

## 2025-05-09 PROCEDURE — 63710000001 SERTRALINE 100 MG TABLET: Performed by: ORTHOPAEDIC SURGERY

## 2025-05-09 PROCEDURE — 25010000002 ROPIVACAINE HCL-NACL 0.2-0.9 % SOLUTION: Performed by: NURSE ANESTHETIST, CERTIFIED REGISTERED

## 2025-05-09 PROCEDURE — 25010000002 ONDANSETRON PER 1 MG: Performed by: NURSE ANESTHETIST, CERTIFIED REGISTERED

## 2025-05-09 PROCEDURE — A9270 NON-COVERED ITEM OR SERVICE: HCPCS | Performed by: ORTHOPAEDIC SURGERY

## 2025-05-09 PROCEDURE — 25810000003 LACTATED RINGERS PER 1000 ML: Performed by: ANESTHESIOLOGY

## 2025-05-09 PROCEDURE — 25010000002 SUGAMMADEX 200 MG/2ML SOLUTION: Performed by: NURSE ANESTHETIST, CERTIFIED REGISTERED

## 2025-05-09 PROCEDURE — 63710000001 ATORVASTATIN 20 MG TABLET: Performed by: ORTHOPAEDIC SURGERY

## 2025-05-09 PROCEDURE — 25010000002 BUPIVACAINE (PF) 0.25 % SOLUTION: Performed by: NURSE ANESTHETIST, CERTIFIED REGISTERED

## 2025-05-09 PROCEDURE — C1769 GUIDE WIRE: HCPCS | Performed by: ORTHOPAEDIC SURGERY

## 2025-05-09 PROCEDURE — G0378 HOSPITAL OBSERVATION PER HR: HCPCS

## 2025-05-09 PROCEDURE — 63710000001 QUETIAPINE 25 MG TABLET: Performed by: ORTHOPAEDIC SURGERY

## 2025-05-09 PROCEDURE — 25010000002 FENTANYL CITRATE (PF) 50 MCG/ML SOLUTION: Performed by: NURSE ANESTHETIST, CERTIFIED REGISTERED

## 2025-05-09 PROCEDURE — 76000 FLUOROSCOPY <1 HR PHYS/QHP: CPT

## 2025-05-09 PROCEDURE — 25010000002 DEXAMETHASONE SODIUM PHOSPHATE 10 MG/ML SOLUTION: Performed by: NURSE ANESTHETIST, CERTIFIED REGISTERED

## 2025-05-09 PROCEDURE — 82948 REAGENT STRIP/BLOOD GLUCOSE: CPT

## 2025-05-09 DEVICE — IMPLANTABLE DEVICE: Type: IMPLANTABLE DEVICE | Site: ANKLE | Status: FUNCTIONAL

## 2025-05-09 DEVICE — SCRW COMPR KREULOCK VA LK FUL/THRD TI 3X16MM: Type: IMPLANTABLE DEVICE | Site: ANKLE | Status: FUNCTIONAL

## 2025-05-09 DEVICE — SCRW LP TI 3.5X12MM: Type: IMPLANTABLE DEVICE | Site: ANKLE | Status: FUNCTIONAL

## 2025-05-09 DEVICE — PLT FX FIB DIST INTERNALBRACE LK TI 6HL LT: Type: IMPLANTABLE DEVICE | Site: ANKLE | Status: FUNCTIONAL

## 2025-05-09 DEVICE — SCRW COMPR KREULOCK VA LK FUL/THRD TI 3X10MM: Type: IMPLANTABLE DEVICE | Site: ANKLE | Status: FUNCTIONAL

## 2025-05-09 DEVICE — SCRW COMPR KREULOCK LK FUL/THRD TI 3.5X12MM: Type: IMPLANTABLE DEVICE | Site: ANKLE | Status: FUNCTIONAL

## 2025-05-09 DEVICE — SCRW CORT FT/ANKL L/P TI 3X18MM: Type: IMPLANTABLE DEVICE | Site: ANKLE | Status: FUNCTIONAL

## 2025-05-09 DEVICE — SCRW COMPR KREULOCK VA LK FUL/THRD TI 3X14MM: Type: IMPLANTABLE DEVICE | Site: ANKLE | Status: FUNCTIONAL

## 2025-05-09 RX ORDER — ATORVASTATIN CALCIUM 20 MG/1
20 TABLET, FILM COATED ORAL NIGHTLY
Status: DISCONTINUED | OUTPATIENT
Start: 2025-05-09 | End: 2025-05-10 | Stop reason: HOSPADM

## 2025-05-09 RX ORDER — AMOXICILLIN 250 MG
2 CAPSULE ORAL 2 TIMES DAILY PRN
Status: DISCONTINUED | OUTPATIENT
Start: 2025-05-09 | End: 2025-05-10 | Stop reason: HOSPADM

## 2025-05-09 RX ORDER — SERTRALINE HYDROCHLORIDE 100 MG/1
100 TABLET, FILM COATED ORAL DAILY
Status: DISCONTINUED | OUTPATIENT
Start: 2025-05-09 | End: 2025-05-10 | Stop reason: HOSPADM

## 2025-05-09 RX ORDER — HYDROMORPHONE HYDROCHLORIDE 1 MG/ML
0.5 INJECTION, SOLUTION INTRAMUSCULAR; INTRAVENOUS; SUBCUTANEOUS
Status: DISCONTINUED | OUTPATIENT
Start: 2025-05-09 | End: 2025-05-09 | Stop reason: HOSPADM

## 2025-05-09 RX ORDER — MAGNESIUM HYDROXIDE 1200 MG/15ML
LIQUID ORAL AS NEEDED
Status: DISCONTINUED | OUTPATIENT
Start: 2025-05-09 | End: 2025-05-09 | Stop reason: HOSPADM

## 2025-05-09 RX ORDER — SODIUM CHLORIDE 9 MG/ML
100 INJECTION, SOLUTION INTRAVENOUS CONTINUOUS
Status: DISCONTINUED | OUTPATIENT
Start: 2025-05-09 | End: 2025-05-10 | Stop reason: HOSPADM

## 2025-05-09 RX ORDER — SODIUM CHLORIDE 0.9 % (FLUSH) 0.9 %
10 SYRINGE (ML) INJECTION AS NEEDED
Status: DISCONTINUED | OUTPATIENT
Start: 2025-05-09 | End: 2025-05-09 | Stop reason: HOSPADM

## 2025-05-09 RX ORDER — DEXAMETHASONE SODIUM PHOSPHATE 10 MG/ML
INJECTION, SOLUTION INTRAMUSCULAR; INTRAVENOUS
Status: COMPLETED | OUTPATIENT
Start: 2025-05-09 | End: 2025-05-09

## 2025-05-09 RX ORDER — AMLODIPINE BESYLATE 10 MG/1
10 TABLET ORAL DAILY
Status: DISCONTINUED | OUTPATIENT
Start: 2025-05-10 | End: 2025-05-10 | Stop reason: HOSPADM

## 2025-05-09 RX ORDER — FENTANYL CITRATE 50 UG/ML
INJECTION, SOLUTION INTRAMUSCULAR; INTRAVENOUS AS NEEDED
Status: DISCONTINUED | OUTPATIENT
Start: 2025-05-09 | End: 2025-05-09

## 2025-05-09 RX ORDER — QUETIAPINE FUMARATE 25 MG/1
50 TABLET, FILM COATED ORAL NIGHTLY
Status: DISCONTINUED | OUTPATIENT
Start: 2025-05-09 | End: 2025-05-10 | Stop reason: HOSPADM

## 2025-05-09 RX ORDER — BISACODYL 10 MG
10 SUPPOSITORY, RECTAL RECTAL DAILY PRN
Status: DISCONTINUED | OUTPATIENT
Start: 2025-05-09 | End: 2025-05-10 | Stop reason: HOSPADM

## 2025-05-09 RX ORDER — SODIUM CHLORIDE, SODIUM LACTATE, POTASSIUM CHLORIDE, CALCIUM CHLORIDE 600; 310; 30; 20 MG/100ML; MG/100ML; MG/100ML; MG/100ML
9 INJECTION, SOLUTION INTRAVENOUS CONTINUOUS
Status: ACTIVE | OUTPATIENT
Start: 2025-05-10 | End: 2025-05-10

## 2025-05-09 RX ORDER — FENTANYL CITRATE 50 UG/ML
INJECTION, SOLUTION INTRAMUSCULAR; INTRAVENOUS
Status: COMPLETED | OUTPATIENT
Start: 2025-05-09 | End: 2025-05-09

## 2025-05-09 RX ORDER — HYDROMORPHONE HYDROCHLORIDE 1 MG/ML
0.5 INJECTION, SOLUTION INTRAMUSCULAR; INTRAVENOUS; SUBCUTANEOUS
Status: DISCONTINUED | OUTPATIENT
Start: 2025-05-09 | End: 2025-05-10 | Stop reason: HOSPADM

## 2025-05-09 RX ORDER — GLYCOPYRROLATE 0.2 MG/ML
INJECTION INTRAMUSCULAR; INTRAVENOUS AS NEEDED
Status: DISCONTINUED | OUTPATIENT
Start: 2025-05-09 | End: 2025-05-09 | Stop reason: SURG

## 2025-05-09 RX ORDER — ROPIVACAINE HYDROCHLORIDE 2 MG/ML
INJECTION, SOLUTION EPIDURAL; INFILTRATION; PERINEURAL CONTINUOUS
Status: DISCONTINUED | OUTPATIENT
Start: 2025-05-09 | End: 2025-05-10 | Stop reason: HOSPADM

## 2025-05-09 RX ORDER — LABETALOL HYDROCHLORIDE 5 MG/ML
10 INJECTION, SOLUTION INTRAVENOUS EVERY 4 HOURS PRN
Status: DISCONTINUED | OUTPATIENT
Start: 2025-05-09 | End: 2025-05-10 | Stop reason: HOSPADM

## 2025-05-09 RX ORDER — BUPIVACAINE HYDROCHLORIDE 2.5 MG/ML
INJECTION, SOLUTION EPIDURAL; INFILTRATION; INTRACAUDAL; PERINEURAL
Status: COMPLETED | OUTPATIENT
Start: 2025-05-09 | End: 2025-05-09

## 2025-05-09 RX ORDER — SODIUM CHLORIDE 0.9 % (FLUSH) 0.9 %
10 SYRINGE (ML) INJECTION EVERY 12 HOURS SCHEDULED
Status: DISCONTINUED | OUTPATIENT
Start: 2025-05-09 | End: 2025-05-09 | Stop reason: HOSPADM

## 2025-05-09 RX ORDER — ONDANSETRON 2 MG/ML
INJECTION INTRAMUSCULAR; INTRAVENOUS AS NEEDED
Status: DISCONTINUED | OUTPATIENT
Start: 2025-05-09 | End: 2025-05-09 | Stop reason: SURG

## 2025-05-09 RX ORDER — LIDOCAINE HYDROCHLORIDE 10 MG/ML
0.5 INJECTION, SOLUTION EPIDURAL; INFILTRATION; INTRACAUDAL; PERINEURAL ONCE AS NEEDED
Status: COMPLETED | OUTPATIENT
Start: 2025-05-09 | End: 2025-05-09

## 2025-05-09 RX ORDER — ASPIRIN 81 MG/1
81 TABLET ORAL DAILY
Qty: 30 TABLET | Refills: 0 | Status: CANCELLED | OUTPATIENT
Start: 2025-05-09 | End: 2025-06-08

## 2025-05-09 RX ORDER — FAMOTIDINE 20 MG/1
20 TABLET, FILM COATED ORAL ONCE
Status: COMPLETED | OUTPATIENT
Start: 2025-05-09 | End: 2025-05-09

## 2025-05-09 RX ORDER — FENTANYL CITRATE 50 UG/ML
50 INJECTION, SOLUTION INTRAMUSCULAR; INTRAVENOUS
Status: DISCONTINUED | OUTPATIENT
Start: 2025-05-09 | End: 2025-05-09 | Stop reason: HOSPADM

## 2025-05-09 RX ORDER — SODIUM CHLORIDE 9 MG/ML
40 INJECTION, SOLUTION INTRAVENOUS AS NEEDED
Status: DISCONTINUED | OUTPATIENT
Start: 2025-05-09 | End: 2025-05-10 | Stop reason: HOSPADM

## 2025-05-09 RX ORDER — OXYCODONE HYDROCHLORIDE 5 MG/1
5 TABLET ORAL EVERY 4 HOURS PRN
Status: DISCONTINUED | OUTPATIENT
Start: 2025-05-09 | End: 2025-05-10 | Stop reason: HOSPADM

## 2025-05-09 RX ORDER — ACETAMINOPHEN 325 MG/1
650 TABLET ORAL EVERY 4 HOURS PRN
Status: DISCONTINUED | OUTPATIENT
Start: 2025-05-09 | End: 2025-05-10 | Stop reason: HOSPADM

## 2025-05-09 RX ORDER — MIDAZOLAM HYDROCHLORIDE 1 MG/ML
0.5 INJECTION, SOLUTION INTRAMUSCULAR; INTRAVENOUS
Status: DISCONTINUED | OUTPATIENT
Start: 2025-05-09 | End: 2025-05-09 | Stop reason: HOSPADM

## 2025-05-09 RX ORDER — LIDOCAINE HYDROCHLORIDE 10 MG/ML
INJECTION, SOLUTION EPIDURAL; INFILTRATION; INTRACAUDAL; PERINEURAL AS NEEDED
Status: DISCONTINUED | OUTPATIENT
Start: 2025-05-09 | End: 2025-05-09 | Stop reason: SURG

## 2025-05-09 RX ORDER — SODIUM CHLORIDE 0.9 % (FLUSH) 0.9 %
10 SYRINGE (ML) INJECTION AS NEEDED
Status: DISCONTINUED | OUTPATIENT
Start: 2025-05-09 | End: 2025-05-10 | Stop reason: HOSPADM

## 2025-05-09 RX ORDER — NALOXONE HCL 0.4 MG/ML
0.1 VIAL (ML) INJECTION
Status: DISCONTINUED | OUTPATIENT
Start: 2025-05-09 | End: 2025-05-10 | Stop reason: HOSPADM

## 2025-05-09 RX ORDER — SODIUM CHLORIDE 0.9 % (FLUSH) 0.9 %
10 SYRINGE (ML) INJECTION EVERY 12 HOURS SCHEDULED
Status: DISCONTINUED | OUTPATIENT
Start: 2025-05-09 | End: 2025-05-10 | Stop reason: HOSPADM

## 2025-05-09 RX ORDER — POLYETHYLENE GLYCOL 3350 17 G/17G
17 POWDER, FOR SOLUTION ORAL AS NEEDED
Status: DISCONTINUED | OUTPATIENT
Start: 2025-05-09 | End: 2025-05-10 | Stop reason: HOSPADM

## 2025-05-09 RX ORDER — ONDANSETRON 4 MG/1
4 TABLET, FILM COATED ORAL EVERY 8 HOURS PRN
Qty: 15 TABLET | Refills: 0 | Status: CANCELLED | OUTPATIENT
Start: 2025-05-09

## 2025-05-09 RX ORDER — ACETAMINOPHEN 650 MG/1
650 SUPPOSITORY RECTAL EVERY 4 HOURS PRN
Status: DISCONTINUED | OUTPATIENT
Start: 2025-05-09 | End: 2025-05-10 | Stop reason: HOSPADM

## 2025-05-09 RX ORDER — METOPROLOL SUCCINATE 100 MG/1
200 TABLET, EXTENDED RELEASE ORAL DAILY
Status: DISCONTINUED | OUTPATIENT
Start: 2025-05-10 | End: 2025-05-10 | Stop reason: HOSPADM

## 2025-05-09 RX ORDER — CLOPIDOGREL BISULFATE 75 MG/1
75 TABLET ORAL DAILY
Status: DISCONTINUED | OUTPATIENT
Start: 2025-05-10 | End: 2025-05-10 | Stop reason: HOSPADM

## 2025-05-09 RX ORDER — ROCURONIUM BROMIDE 10 MG/ML
INJECTION, SOLUTION INTRAVENOUS AS NEEDED
Status: DISCONTINUED | OUTPATIENT
Start: 2025-05-09 | End: 2025-05-09 | Stop reason: SURG

## 2025-05-09 RX ORDER — PROPOFOL 10 MG/ML
VIAL (ML) INTRAVENOUS AS NEEDED
Status: DISCONTINUED | OUTPATIENT
Start: 2025-05-09 | End: 2025-05-09 | Stop reason: SURG

## 2025-05-09 RX ORDER — ONDANSETRON 2 MG/ML
4 INJECTION INTRAMUSCULAR; INTRAVENOUS ONCE AS NEEDED
Status: DISCONTINUED | OUTPATIENT
Start: 2025-05-09 | End: 2025-05-09 | Stop reason: HOSPADM

## 2025-05-09 RX ADMIN — Medication 10 ML: at 19:57

## 2025-05-09 RX ADMIN — SODIUM CHLORIDE 2 G: 900 INJECTION INTRAVENOUS at 19:57

## 2025-05-09 RX ADMIN — SERTRALINE 100 MG: 100 TABLET, FILM COATED ORAL at 17:18

## 2025-05-09 RX ADMIN — FAMOTIDINE 20 MG: 20 TABLET, FILM COATED ORAL at 11:11

## 2025-05-09 RX ADMIN — ROCURONIUM BROMIDE 50 MG: 10 INJECTION INTRAVENOUS at 12:02

## 2025-05-09 RX ADMIN — GLYCOPYRROLATE 0.2 MCG: 0.2 INJECTION, SOLUTION INTRAMUSCULAR; INTRAVENOUS at 13:06

## 2025-05-09 RX ADMIN — LIDOCAINE HYDROCHLORIDE 50 MG: 10 INJECTION, SOLUTION EPIDURAL; INFILTRATION; INTRACAUDAL; PERINEURAL at 12:02

## 2025-05-09 RX ADMIN — LIDOCAINE HYDROCHLORIDE 0.5 ML: 10 INJECTION, SOLUTION EPIDURAL; INFILTRATION; INTRACAUDAL; PERINEURAL at 11:11

## 2025-05-09 RX ADMIN — SODIUM CHLORIDE, POTASSIUM CHLORIDE, SODIUM LACTATE AND CALCIUM CHLORIDE: 600; 310; 30; 20 INJECTION, SOLUTION INTRAVENOUS at 11:57

## 2025-05-09 RX ADMIN — ONDANSETRON 4 MG: 2 INJECTION INTRAMUSCULAR; INTRAVENOUS at 14:28

## 2025-05-09 RX ADMIN — SUGAMMADEX 200 MG: 100 INJECTION, SOLUTION INTRAVENOUS at 14:39

## 2025-05-09 RX ADMIN — FENTANYL CITRATE 100 MCG: 50 INJECTION, SOLUTION INTRAMUSCULAR; INTRAVENOUS at 11:23

## 2025-05-09 RX ADMIN — FENTANYL CITRATE 50 MCG: 50 INJECTION, SOLUTION INTRAMUSCULAR; INTRAVENOUS at 12:02

## 2025-05-09 RX ADMIN — DEXAMETHASONE SODIUM PHOSPHATE 2 MG: 10 INJECTION INTRAMUSCULAR; INTRAVENOUS at 11:23

## 2025-05-09 RX ADMIN — Medication 1000 MG: at 15:19

## 2025-05-09 RX ADMIN — SODIUM CHLORIDE 2 G: 900 INJECTION INTRAVENOUS at 12:07

## 2025-05-09 RX ADMIN — PROPOFOL 150 MG: 10 INJECTION, EMULSION INTRAVENOUS at 12:02

## 2025-05-09 RX ADMIN — BUPIVACAINE HYDROCHLORIDE 20 ML: 2.5 INJECTION, SOLUTION EPIDURAL; INFILTRATION; INTRACAUDAL; PERINEURAL at 11:23

## 2025-05-09 RX ADMIN — BUPIVACAINE HYDROCHLORIDE 30 ML: 2.5 INJECTION, SOLUTION EPIDURAL; INFILTRATION; INTRACAUDAL; PERINEURAL at 11:29

## 2025-05-09 RX ADMIN — FENTANYL CITRATE 50 MCG: 50 INJECTION, SOLUTION INTRAMUSCULAR; INTRAVENOUS at 13:19

## 2025-05-09 RX ADMIN — QUETIAPINE FUMARATE 50 MG: 25 TABLET ORAL at 19:58

## 2025-05-09 RX ADMIN — ATORVASTATIN CALCIUM 20 MG: 20 TABLET, FILM COATED ORAL at 19:58

## 2025-05-09 NOTE — INTERVAL H&P NOTE
H&P reviewed. The patient was examined and there are no changes to the H&P.    Oliver Trejo MD  [facility] Surgeon

## 2025-05-09 NOTE — TELEPHONE ENCOUNTER
Dr Trejo wanted me to call [facility] concerning patient's upcoming surgery. They advised me patient is not due to return to office  for 1mth. Should be good for his ankle surgery.

## 2025-05-09 NOTE — ANESTHESIA PREPROCEDURE EVALUATION
Anesthesia Evaluation     Patient summary reviewed and Nursing notes reviewed   no history of anesthetic complications:   NPO Solid Status: > 8 hours  NPO Liquid Status: > 2 hours           Airway   Mallampati: II  TM distance: >3 FB  Neck ROM: full  No difficulty expected  Dental - normal exam     Pulmonary - normal exam   (+) a smoker, COPD,  (-) asthma, sleep apnea  Cardiovascular - normal exam  Exercise tolerance: good (4-7 METS)    ECG reviewed    (+) hypertension, PVD (hx vascular stent, on plavix), hyperlipidemia  (-) dysrhythmias, angina, cardiac stents      Neuro/Psych  (+) syncope, psychiatric history Anxiety  (-) seizures, CVA  GI/Hepatic/Renal/Endo    (+) GERD well controlled, diabetes mellitus    Musculoskeletal     Abdominal    Substance History   (+) alcohol use (in remission)     OB/GYN          Other      history of cancer (prostate ca)    ROS/Med Hx Other: Hgb 14.6 k 4.4  SAH 2021 5/4 - plavix last dose  No n/v/gerd/glp1                  Anesthesia Plan    ASA 3     general with block     (Risks and benefits of general anesthesia discussed with patient (including MI, CVA, death, recall, aspiration, oropharyngeal/dental damage), questions answered, agreeable to proceed.    Benefits and risks of PNB nerve block/catheter discussed with patient ((including failed block, damage to nerve/nearby structures, intravascular injection)); questions answered, agreeable to proceed.      )  intravenous induction     Anesthetic plan, risks, benefits, and alternatives have been provided, discussed and informed consent has been obtained with: patient and spouse/significant other.    Plan discussed with CRNA.    CODE STATUS:

## 2025-05-09 NOTE — ANESTHESIA PROCEDURE NOTES
LEFT Adductor canal SS      Patient reassessed immediately prior to procedure    Start time: 5/9/2025 11:23 AM  Reason for block: at surgeon's request and post-op pain management  Performed by  CRNA/CAA: Oliver Quintana CRNA  Assisted by: Amber Desai RN  Preanesthetic Checklist  Completed: patient identified, IV checked, site marked, risks and benefits discussed, surgical consent, monitors and equipment checked, pre-op evaluation and timeout performed  Prep:  Pt Position: supine  Sterile barriers:cap, gloves, mask, sterile barriers and washed/disinfected hands  Prep: ChloraPrep  Patient monitoring: blood pressure monitoring, continuous pulse oximetry and EKG  Procedure    Sedation: yes  Performed under: local infiltration  Guidance:ultrasound guided    ULTRASOUND INTERPRETATION.  Using ultrasound guidance a 20 G gauge needle was placed in close proximity to the nerve, at which point, under ultrasound guidance anesthetic was injected in the area of the nerve and spread of the anesthesia was seen on ultrasound in close proximity thereto.  There were no abnormalities seen on ultrasound; a digital image was taken; and the patient tolerated the procedure with no complications. Images:still images obtained, printed/placed on chart    Laterality:left  Block Type:adductor canal block  Injection Technique:single-shot  Needle Type:Tuohy and echogenic  Needle Gauge:18 G  Resistance on Injection: none  Catheter Size:20 G (20g)    Medications Used: bupivacaine PF (MARCAINE) 0.25 % injection - Injection   20 mL - 5/9/2025 11:23:00 AM  dexamethasone sodium phosphate injection - Injection   2 mg - 5/9/2025 11:23:00 AM  fentaNYL citrate (PF) (SUBLIMAZE) injection - Intravenous   100 mcg - 5/9/2025 11:23:00 AM      Post Assessment  Injection Assessment: negative aspiration for heme, incremental injection and no paresthesia on injection  Patient Tolerance:comfortable throughout block  Complications:no  Additional Notes  SINGLE  "shot   A high-frequency linear transducer, with sterile cover, was placed on the anterior mid-thigh (between the anterior superior iliac spine and patella). The transducer was then moved medially to identify the Sartorius muscle (Regulo), Vastus Medialis muscle (VMM), Superficial Femoral Artery (SFA) and Vein. The transducer was then moved cephalad or caudad to position the SFA in the middle of the Regulo. The insertion site was prepped and draped in sterile fashion. Skin and cutaneous tissue was infiltrated with 2-5 ml of 1% Lidocaine. Using ultrasound-guidance, a 20-gauge B-Gibbons 4\" Ultraplex 360 non-stimulating echogenic needle was advanced in plane from lateral to medial. Preservative-free normal saline was utilized for hydro-dissection of tissue, advancement of needle, and to confirm needle placement below the fascial plane of the Regulo where the Nerve to the VMM is located. Local anesthetic (LA) 5 ml deposited here. The needle continues its path lateral to the SFA at the level of the Saphenous Nerve. The remainder of the LA was deposited at the 10-11 o'clock position of the SFA. This injection created a space between the Regulo and the SFA. Aspiration every 5 ml to prevent intravascular injection. Injection was completed with negative aspiration of blood and negative intravascular injection. Injection pressures were normal with minimal resistance.   Performed by: Oliver Quintana CRNA          "

## 2025-05-09 NOTE — OP NOTE
ORTHOPAEDIC SURGERY OPERATIVE REPORT    Location of Surgery: [facility]    Patient Name:  Ernesto Vann  YOB: 1945    Date of Surgery:  5/9/2025     Pre-op Diagnosis:   Left ankle bimalleolar fracture, syndesmotic injury    Post-op Diagnosis:      Same    Procedure/CPT® Codes:  1. ORIF bmalleolar ankle fracture no post mal, 74701  2. ORIF Syndesmosis, 48944  3. Stress fluoroscopy ankle 80060 and 96302  4. Application short leg splint, 22116    Staff:  Surgeon(s):  Oliver Trejo MD       Anesthesia: General with Block    Estimated Blood Loss: minimal    Specimen:          None    Complications:   None    CLINICAL HISTORY:  Ernesto Vann is a 79 y.o. male with the above condition. Discussed operative and non-operative treatment options and risks including but not limited to pain, infection, bleeding, damage to surrounding structures, and need for additional procedures.  After all questions were fully answered, Ernesto Vann understood the risks and elected to proceed, and informed consent was obtained. The patient was marked with indelible marking pen after verifying correct side, site, and procedure.      DESCRIPTION OF PROCEDURE:   The patient was identified in the pre-operative area where correct procedure, site, and patient were verified. The patient was brought to the operating theater in stable condition and was transferred to the operative table where they remained in the supine position for the entirety of the case. Preoperative timeout was taken to ensure the correct identity, operative procedure, and location. General anesthesia was then administered. The patient received appropriate weight based IV antibiotics within 30 minutes of skin incision.  All bony prominences well-padded. The left leg was then prepped and draped in the standard sterile fashion. After Esmarch exsanguination, the tourniquet was inflated.     I started by making a lateral incision over the  fibula, posterior to a quarter sized fracture blister. Blunt dissection was carried through the subcutaneous tissues down to the periosteal layer, taking care to identify any branches of the superficial peroneal nerve in the field. The fracture site was identified and the edges cleaned in preparation for reduction.  The fibula was reduced and held with temporary instrumentation.  The reduction was checked on fluoroscopy and confirmed to be anatomic.  The fracture was then lagged with a 2.7 millimeter screw.  An Arthrex anatomic distal fibular locking plate was selected and placed on the fibula.  Plate position was confirmed by fluoroscopy and the plate was affixed to the fibula with locking screws and nonlocking screws distally and proximally.     Next I turned my attention to the medial ankle.  Due to the nondisplaced nature of the medial malleoli are fracture a percutaneous technique was used.  2 K wires were placed at the trajectory for the planned screws. Position was checked on fluoroscopy.  Fixation using two 4-0 bicortical screws was applied to compress and stabilize the fracture.     Next I turned my attention back to the lateral wound.  To increase the stability of the construct and restore ankle mortise stability, Smith syndesmotic fixation was deemed necessary. A tong clamp was applied to reduce the syndesmosis from the lateral fibula to the medial tibia in a slightly externally rotated plane. Syndesmotic fixation was achieved with 2 quadricortical 3.5 mm syndesmotic screws.. The tourniquet was released before 120 minutes had elapsed. Final x-rays were then obtained and then an external rotation test was performed under fluoroscopy manually by me. There was no instability of the syndesmosis on stress imaging.  All hardware was found to be in proper positioning and all screw lengths were appropriate.      At this point we irrigated all wounds with Irrisept and sterile saline solution.  The periosteal layers  were closed with 2-0 Monocryl suture. Subcutaneous layers were closed with 3-0 Monocryl.  The skin was closed with 3-0 nylon. A sterile dressing was applied followed by well-padded 3-way splint.  The ankle was splinted in neutral.     The patient tolerated the procedure well no with acute complications identified.  All sponge & needle counts were correct x2 prior to procedure conclusion.  Patient was awoken from anesthesia and transferred back to the PACU without complication.     Counts:    Sponge: Correct    Needle: Correct    Sharp: Correct    Instrument: Correct     POSTOPERATIVE PLAN:   -Admit to floor  -NWB operative extremity  -Advance diet as tolerated  -Keep splint/operative dressing clean and dry  -DVT ppx, mechanical and chemical, restart plavix tomorrow  -Postoperative antibiotics  -Pain control  -Consult medicine, appreciate recs  -PT/OT  -Elevate operative extremity  -DC planning    Implants:    Implant Name Type Inv. Item Serial No.  Lot No. LRB No. Used Action   SCRW VI LP PT TI 3X18MM - [id number] Implant SCRW VI LP PT TI 3X18MM  ARTHREX NA Left 1 Implanted   PLT FX FIB DIST INTERNALBRACE LK TI 6HL LT - [id number] Implant PLT FX FIB DIST INTERNALBRACE LK TI 6HL LT  ARTHREX  Left 1 Implanted   SCRW COMPR KREULOCK VA LK FUL/THRD TI 3X14MM - [id number] Implant SCRW COMPR KREULOCK VA LK FUL/THRD TI 3X14MM  AR[employer]REX  Left 3 Implanted   SCRW COMPR KREULOCK VA LK FUL/THRD TI 3X16MM - [id number] Implant SCRW COMPR KREULOCK VA LK FUL/THRD TI 3X16MM  ARTHREX  Left 1 Implanted   SCRW COMPR KREULOCK VA LK FUL/THRD TI 3X10MM - [id number] Implant SCRW COMPR KREULOCK VA LK FUL/THRD TI 3X10MM  ARTHREX  Left 1 Implanted   SCRW LP TI 3.5X12MM - [id number] Implant SCRW LP TI 3.5X12MM  ARTHREX  Left 1 Implanted   SCRW COMPR KREULOCK LK FUL/THRD TI 3.5X12MM - [id number] Implant SCRW COMPR KREULOCK LK FUL/THRD TI 3.5X12MM  ARTHREX  Left 2 Implanted   GW TROC TP 1.81D241JP - [id number] Implant GW  TROC TP 1.90X339NV  ARTHREX  Left 3 Implanted   SCRW VI QUICKFIX CANC PT SHT/THRD 4X48MM - [id number] Implant SCRW VI QUICKFIX CANC PT SHT/THRD 4X48MM  ARTHREX  Left 1 Implanted   SCRW VI QUICKFIX CANC TI 4X55MM - [id number] Implant SCRW VI QUICKFIX CANC TI 4X55MM  ARTH[employer]X  Left 1 Implanted   SCRW CURLY FT/ANKL L/P TI 3.5X60MM - [id number] Implant SCRW CURLY FT/ANKL L/P TI 3.5X60MM  ARTHREX  Left 1 Implanted   SCRW LP FT/ANKL TI 3.5X50MM - [id number] Implant SCRW LP FT/ANKL TI 3.5X50MM  ARTHREX  Left 1 Implanted       Oliver Trejo MD     Date: 5/9/2025  Time: 15:25 EDT  Electronically signed by Oliver Trejo MD, 05/09/25, 3:25 PM EDT.

## 2025-05-09 NOTE — ANESTHESIA PROCEDURE NOTES
Airway  Reason: elective    Date/Time: 5/9/2025 12:05 PM  Airway not difficult    General Information and Staff    Patient location during procedure: OR  CRNA/CAA: Amber Perdomo CRNA    Indications and Patient Condition  Indications for airway management: airway protection    Preoxygenated: yes  MILS not maintained throughout    Mask difficulty assessment: 1 - vent by mask    Final Airway Details    Final airway type: endotracheal airway      Successful airway: ETT  Cuffed: yes   Successful intubation technique: direct laryngoscopy  Endotracheal tube insertion site: oral  Blade: Kishan  Blade size: 4  ETT size (mm): 8.0  Cormack-Lehane Classification: grade I - full view of glottis  Placement verified by: chest auscultation and capnometry   Measured from: lips  ETT/EBT  to lips (cm): 20  Number of attempts at approach: 1  Assessment: lips, teeth, and gum same as pre-op and atraumatic intubation    Additional Comments  Negative epigastric sounds, Breath sound equal bilaterally with symmetric chest rise and fall

## 2025-05-09 NOTE — H&P
Patient Name: Ernesto Vann  MRN: [id number]  : 1945  DOS: 2025    Attending: Oliver Trejo MD    Primary Care Provider: Mali Galeana MD      Chief complaint: Left ankle fracture    Subjective   Patient is a pleasant 79 y.o. male presented for scheduled surgery by Dr. Trejo.    Per his office note (Ernesto Vann is a 79 y.o. male who is here today left ankle fracture.  Seen today previously at urgent care and he came to my office for further management.  Was placed in tall cam walking boot at urgent care.  Suffered mechanical fall about 2 weeks ago injuring left ankle.  Has been walking with assistive devices in shoe with continued pain and swelling since that time.  Of note patient has peripheral artery disease with stent right lower extremity.  Is currently following closely with cardiology as well as vascular surgery.  Currently  taking Plavix for history of stent.).  Patient has been diagnosed with closed fracture of the left ankle.    He underwent the following procedures:  1. ORIF bmalleolar ankle fracture no post mal, 92704  2. ORIF Syndesmosis, 82627  3. Stress fluoroscopy ankle 93451 and 72930  4. Application short leg splint, 17369  Surgery was done under general anesthesia and a block, was tolerated well, is admitted for further management.    Seen in PACU, doing fairly well, good pain control, no complains of nausea, vomiting, or shortness of breath.    Reviewed patient's past medical history which is quite extensive, reviewed patient's medications.       Allergies   Allergen Reactions    Diclofenac GI Bleeding and Unknown (See Comments)    Tofranil [Imipramine Hcl] Other (See Comments)     Syncope, falls    Lisinopril Cough          Medications Prior to Admission   Medication Sig Dispense Refill Last Dose/Taking    Acetaminophen 500 MG capsule Take 2 capsules by mouth Every 6 (Six) Hours.   Past Week    amLODIPine (NORVASC) 10 MG tablet Take 1 tablet by mouth Daily. 90 tablet  1 5/9/2025    atorvastatin (LIPITOR) 20 MG tablet Take 1 tablet by mouth Daily. 90 tablet 1 5/8/2025    metoprolol succinate XL (TOPROL-XL) 100 MG 24 hr tablet Take 2 tablets by mouth Daily. 180 tablet 1 5/9/2025    QUEtiapine (SEROquel) 50 MG tablet Take 1 tablet by mouth Every Night. 90 tablet 1 5/8/2025    sertraline (Zoloft) 100 MG tablet Take one 50 mg tablet by mouth daily for two weeks, then take one 100 mg tablet by mouth daily for two weeks, after four weeks, increase to one 50 mg tablet by mouth daily along with one 100 mg tablet by mouth daily to equal 150 mg by mouth daily 120 tablet 0 5/8/2025    sertraline (Zoloft) 50 MG tablet Take one 50 mg tablet by mouth daily for two weeks, then take one 100 mg tablet by mouth daily for two weeks, after four weeks, increase to one 50 mg tablet by mouth daily along with one 100 mg tablet by mouth daily to equal 150 mg by mouth daily 120 tablet 0 5/8/2025    solifenacin (VESICARE) 10 MG tablet Take 1 tablet by mouth Daily.   5/8/2025    clopidogrel (PLAVIX) 75 MG tablet TAKE 1 TABLET BY MOUTH ONCE DAILY 90 tablet 1 5/4/2025            Past Medical History:   Diagnosis Date    Alcohol abuse     Allergic     Anxiety     Benign colon polyp     Cancer     PROSTATE CANCER    Cataract     Chronic obstructive pulmonary disease 06/17/2016    COPD (chronic obstructive pulmonary disease)     DDD (degenerative disc disease), lumbar     Depression     Diabetes mellitus     Dyslipidemia     Elevated PSA 12/2018    GERD (gastroesophageal reflux disease)     Hyperlipidemia     Hypertension     Inguinal hernia     Irritable bowel syndrome with both constipation and diarrhea 5/15/2024    PAD (peripheral artery disease)     Prostate cancer     SAH (subarachnoid hemorrhage) 06/30/2021    Tobacco abuse      Past Surgical History:   Procedure Laterality Date    BELPHAROPTOSIS REPAIR Bilateral 09/01/2019    COLONOSCOPY  2019    EYE SURGERY      cataract    FEMORAL ARTERY - FEMORAL ARTERY  "BYPASS GRAFT      FEMORAL ARTERY STENT      INGUINAL HERNIA REPAIR Right     LUMBAR SYMPATHETIC NERVE BLOCK      PROSTATECTOMY N/A 2016    Procedure: PROSTATECTOMY LAPAROSCOPIC WITH DAVINCI ROBOT WITH NODES;  Surgeon: Victorino Emerson Jr., MD;  Location: [facility] OR;  Service:     TONSILLECTOMY      TONSILLECTOMY AND ADENOIDECTOMY      VASECTOMY       Family History   Problem Relation Age of Onset    Cancer Mother     Hypertension Mother     Dementia Mother     Heart disease Father     Heart attack Father     Breast cancer Sister     Brain cancer Sister     Lung cancer Sister     Dementia Maternal Grandmother      Social History     Tobacco Use    Smoking status: Former     Current packs/day: 0.00     Average packs/day: 1 pack/day for 30.0 years (30.0 ttl pk-yrs)     Types: Cigarettes     Start date: 2000     Quit date: 2023     Years since quittin.8     Passive exposure: Past    Smokeless tobacco: Never    Tobacco comments:     HAS SMOKED 1PPD IN THE PAST   Vaping Use    Vaping status: Never Used   Substance Use Topics    Alcohol use: Not Currently     Alcohol/week: 30.0 - 35.0 standard drinks of alcohol     Types: 30 - 35 Glasses of wine per week     Comment: admits to 6 daily    Drug use: No       Review of Systems  Pertinent items are noted in HPI    Vital Signs  /77 (BP Location: Right arm, Patient Position: Sitting)   Pulse 53   Temp 97.8 °F (36.6 °C) (Oral)   Resp 18   Ht 177.8 cm (70\")   Wt 73.5 kg (162 lb)   SpO2 97%   BMI 23.24 kg/m²     Physical Exam:    General Appearance:    Alert, cooperative, in no acute distress   Head:    Normocephalic, without obvious abnormality, atraumatic   Eyes:            Partial ptosis left eye.    Ears:    Ears appear intact with no abnormalities noted   Throat:   No oral lesions, no thrush, oral mucosa moist   Neck:   No adenopathy, supple, trachea midline, no thyromegaly         Lungs:     Clear to auscultation,respirations " "regular, even and                   unlabored    Heart:    Regular rhythm and normal rate, normal S1 and S 2   Abdomen:     Normal bowel sounds, no masses, no organomegaly, soft,        nontender, nondistended, no guarding, no rebound                 tenderness   Genitalia:    Deferred   Extremities: Clean dry and intact left lower extremity splint.  Wiggles toes.  Peripheral nerve block catheter present.   Pulses:   Pulses palpable and equal bilaterally   Skin:   No bleeding, bruising or rash   Neurologic:   Cranial nerves 2 - 12 grossly intact, left eye partial ptosis.      I reviewed the patient's new clinical results.             Invalid input(s): \"NEUTOPHILPCT\"        Invalid input(s): \"LABALBU\", \"PROT\"  Lab Results   Component Value Date    HGBA1C 5.10 10/22/2024      Latest Reference Range & Units 04/14/25 11:02   Sodium 136 - 145 mmol/L 130 (L)   Potassium 3.5 - 5.2 mmol/L 4.4   Chloride 98 - 107 mmol/L 91 (L)   CO2 22.0 - 29.0 mmol/L 22.6   Anion Gap 5.0 - 15.0 mmol/L 16.4 (H)   BUN 8 - 23 mg/dL 11   Creatinine 0.76 - 1.27 mg/dL 0.96   BUN/Creatinine Ratio 7.0 - 25.0  11.5   eGFR >60.0 mL/min/1.73 80.9   Glucose 65 - 99 mg/dL 87   Calcium 8.6 - 10.5 mg/dL 9.2   Alkaline Phosphatase 39 - 117 U/L 76   Total Protein 6.0 - 8.5 g/dL 7.2   Albumin 3.5 - 5.2 g/dL 4.8   Globulin gm/dL 2.4   A/G Ratio g/dL 2.0   AST (SGOT) 1 - 40 U/L 18   ALT (SGPT) 1 - 41 U/L 12   Total Bilirubin 0.0 - 1.2 mg/dL 0.6   (L): Data is abnormally low  (H): Data is abnormally high   Latest Reference Range & Units 04/14/25 11:02   WBC 3.40 - 10.80 10*3/mm3 6.53   RBC 4.14 - 5.80 10*6/mm3 4.68   Hemoglobin 13.0 - 17.7 g/dL 14.6   Hematocrit 37.5 - 51.0 % 40.7   Platelets 140 - 450 10*3/mm3 237   RDW 12.3 - 15.4 % 13.2   MCV 79.0 - 97.0 fL 87.0   MCH 26.6 - 33.0 pg 31.2   MCHC 31.5 - 35.7 g/dL 35.9 (H)   MPV 6.0 - 12.0 fL 8.9   RDW-SD 37.0 - 54.0 fl 41.7   (H): Data is abnormally high    Assessment and Plan:       S/P ORIF (open reduction " internal fixation) fracture, left ankle, ORIF Synesmosis    Chronic obstructive pulmonary disease    Essential hypertension    Generalized anxiety disorder    Hyperlipidemia    Neuropathy    Closed fracture of left ankle    Ankle fracture  Peripheral vascular disease    Plan  1. PT/OT, nonweightbearing left lower extremity  2. Pain control-prns peripheral nerve block catheter, multimodal approach   3. IS-encourage  4. DVT proph- mechanicals and resumption of Plavix  5. Bowel regimen  6. Resume home medications as appropriate  7. Monitor post-op labs  8. Discharge planning      - Hypertension, hyperlipidemia, peripheral vascular disease: Resume home regimen of antihypertensive medications with holding parameters, statin, and Plavix.    -Anxiety: Resume Zoloft.    Dragon disclaimer:  Part of this encounter note is an electronic transcription/translation of spoken language to printed text. The electronic translation of spoken language may permit erroneous, or at times, nonsensical words or phrases to be inadvertently transcribed; Although I have reviewed the note for such errors, some may still exist.    Sapna Aguilar MD  05/09/25  17:19 EDT

## 2025-05-09 NOTE — ANESTHESIA POSTPROCEDURE EVALUATION
Patient: Ernesto Vann    Procedure Summary       Date: 05/09/25 Room / Location:  [facility] /  [facility]    Anesthesia Start: 1157 Anesthesia Stop: 1508    Procedure: Left Ankle open reduction internal fixation, possible Smith syndesmotic fixation (Left: Ankle) Diagnosis:       Closed fracture of left ankle, initial encounter      (Closed fracture of left ankle, initial encounter [S82.892A])    Surgeons: Oliver Trejo MD Provider: Johnnie Lopez MD    Anesthesia Type: general with block ASA Status: 3            Anesthesia Type: general with block    Vitals  Vitals Value Taken Time   /87 05/09/25 15:04   Temp 98.6    Pulse 61 05/09/25 15:08   Resp 13    SpO2 98 % 05/09/25 15:08   Vitals shown include unfiled device data.        Post Anesthesia Care and Evaluation    Patient location during evaluation: PACU  Patient participation: complete - patient participated  Level of consciousness: awake and alert  Pain score: 0  Pain management: adequate    Airway patency: patent  Anesthetic complications: No anesthetic complications  PONV Status: none  Cardiovascular status: hemodynamically stable and acceptable  Respiratory status: nonlabored ventilation, acceptable and nasal cannula  Hydration status: acceptable

## 2025-05-09 NOTE — ANESTHESIA PROCEDURE NOTES
LEFT Popliteal cath      Patient reassessed immediately prior to procedure    Patient location during procedure: pre-op  Start time: 5/9/2025 11:29 AM  Reason for block: at surgeon's request and post-op pain management  Performed by  CRNA/CAA: Oliver Quintana CRNA  Assisted by: Amber Desai RN  Preanesthetic Checklist  Completed: patient identified, IV checked, site marked, risks and benefits discussed, surgical consent, monitors and equipment checked, pre-op evaluation and timeout performed  Prep:  Pt Position: supine  Sterile barriers:cap, gloves, mask and washed/disinfected hands  Prep: ChloraPrep  Patient monitoring: blood pressure monitoring, continuous pulse oximetry and EKG  Procedure    Sedation: yes  Performed under: local infiltration  Guidance:ultrasound guided    ULTRASOUND INTERPRETATION.  Using ultrasound guidance a 20 G gauge needle was placed in close proximity to the nerve, at which point, under ultrasound guidance anesthetic was injected in the area of the nerve and spread of the anesthesia was seen on ultrasound in close proximity thereto.  There were no abnormalities seen on ultrasound; a digital image was taken; and the patient tolerated the procedure with no complications. Images:still images obtained, printed/placed on chart    Laterality:left  Block Type:popliteal  Injection Technique:catheter  Needle Type:echogenic and Tuohy  Needle Gauge:18 G  Resistance on Injection: none  Catheter Size:20 G  Cath Depth at skin: 6 cm    Medications Used: bupivacaine PF (MARCAINE) 0.25 % injection - Injection   30 mL - 5/9/2025 11:29:00 AM      Post Assessment  Injection Assessment: negative aspiration for heme, no paresthesia on injection and incremental injection  Patient Tolerance:comfortable throughout block  Complications:no  Additional Notes  CATHETER                               A high-frequency linear transducer, with sterile cover, was placed in the popliteal fossa to identify the popliteal  "artery and vein, Tibial nerve (TN) and Common Peroneal nerve (CP). The transducer was then moved in a cephalad fashion to observe the TN and CP nerve bifurcation to form the Sciatic Nerve. The insertion site was prepped and draped in sterile fashion. Skin and cutaneous tissue was infiltrated with 2-5 ml of 1% Lidocaine. Using ultrasound-guidance, an 18-gauge Contiplex Ultra 360 Touhy needle was advanced in plane from lateral to medial. Preservative-free normal saline was utilized for hydro-dissection of tissue, advancement of Touhy needle, and to confirm final needle placement posterior to the nerves. Local anesthetic injection spread, in incremental 3-5 ml injections, to surround both nerve structures. Aspiration every 5 ml to prevent intravascular injection. Injection was completed with negative aspiration of blood and negative intravascular injection. Injection pressures were normal with minimal resistance. A 20-gauge Contiplex Echo catheter was placed through the needle and advance out the tip of the Touhy 1-3 cm. The Touhy needle was then removed, and final catheter position verified (Below/above or Anterior/Posterior) the nerve structures. The catheter was secured in the usual fashion with skin glue, benzoin, steri-strips, CHG tegaderm and Label noting \"Nerve Block Catheter\". Jerk tape applied at yellow connector and catheter connection.    Performed by: Oliver Quintana, MICKY            "

## 2025-05-09 NOTE — DISCHARGE INSTRUCTIONS
Oliver Trejo MD  Orthopedic Foot & Ankle Surgeon  [facility]    Diagnosis: No chief complaint on file.       Procedure Performed: Procedure(s) (LRB):  Left Ankle open reduction internal fixation, possible Smith syndesmotic fixation (Left)    What to Expect After Surgery  Diet after surgery:  Resume your diet gradually.  Begin with clear liquids and gradually progress as you feel ready.  Surgical Site Care:  Please remain in your post-operative dressing/splint until your first post op appointment with Dr. Trejo.  Please make sure to keep your post-operative dressing clean and dry.  Please use a shower bag (e.g., [email or web address]) when showering or bathing to keep things dry. Wet padding can cause skin breakdown.  Do not stick anything down your cast or splint.  If you sustain a scratch, you are at higher risk for infection as casts and splints harbor more bacteria.  Follow Up Appointment: Please call the [facility] at [phone] or Dr. Trejo's schedulers within two (2) days of your discharge to /confirm/verify your follow-up appointment date/time with Dr. Trejo.  Typically, Dr. Trejo will want to see you 14-21 days after surgery for a post-operative follow-up appointment - before 14 days, the sutures may have to stay in and you will need to return in the 14-21 day window again.  Please arrive ~15 minutes prior to your appointment time to take care of any paperwork.      Activity Precautions:  Elevate the leg above your heart as much as possible for the first two weeks after surgery.  If the leg is higher than your heart, gravity helps decrease swelling, decrease pain, and improve blood flow.  If the leg is below your heart, swelling increases, pain can worsen, and your wound is at greater risk of infection.  Keep all weight off of your surgical leg until cleared by your physician.  Use ice packs intermittently (20 minutes on, 20 minutes off) to reduce pain  and swelling, however, use extreme caution with icing if your block is still in effect.  Make sure to have a barrier between your skin and the ice pack to avoid frost bite.   If you are in a post-operative splint, you can wiggle your toes to help push swelling to your lymph ducts, but do not try to move your ankle.      Pain Management  Nerve Blocks:  to help control pain after surgery, you may have received a nerve block where the anesthesiologist injected numbing medication around the nerves that send pain signals from your foot or ankle to your brain.  This helps you feel little to no pain.   The time a nerve block lasts varies from person to person.  Often, they will last between 12 and 24 hours but could last days.  You may not be able to move your foot and/or ankle during this time.  As the block medication wears off, you may feel a tingling sensation as the nerves start to wake up.  Keep your foot and ankle safe while it is numb.  Avoid excessive heating,  scratching, etc. until the block has completely worn off.  If icing the ankle or foot, watch the toes closely to ensure that they do not become discolored (i.e., white, red or blue)  Even if you still feel no pain in your leg before you go to bed, take a dose of your narcotic medication before you go to sleep.  You do not want to wake up with the block having worn off and being behind on pain control - it is quite difficult to 'catch up' again.  Pain Medication:    Acetaminophen (Tylenol) 500 mg tablets are typically your baseline pain medication.  Take this tablet up to once every 4 hours. Do not exceed 3,000 mg of acetaminophen daily.  You have been provided Oxycodone immediate release tablets as your initial narcotic pain reliever. Take doses of this medication if you are having severe breakthrough pain uncontrolled by the Tylenol alone. Typically, patients are taking it every 4 hours for the first day or two after surgery.  Actively wean down your use  of this medication after the third day after surgery.  Note that it takes about 30-45 minutes for oral pain medication to take effect.  DO NOT drink alcoholic beverages, use recreational drugs, or use prescription sedative medications when taking pain medication.  DO NOT operate heavy machinery/drive while taking opioids.  To avoid the risk of nausea, please make sure that you are taking your medication with food.  You may experience light headedness while taking your pain medication.  Make sure you drink plenty of water while taking narcotic pain medication to stay well hydrated and help avoid constipation.    You have been provided a Docusate prescription to help with constipation that can be a side effect of taking narcotics.  Take that medication as needed.  You have been provided a Zofran prescription to help with nausea that you can take as needed.  Itching is a common side effect to pain medication usage.  If you have an associated rash with the itching, please contact your provider.       When to Call Your Physician  Common or Normal Post-Operative Reactions:  Low grade fever (approximately 100.5 degrees) for up to one week.  Small amount of blood or fluid leaking from the surgical site or dressing  Bruising along the surgical extremity  Swelling around the surgical site and surrounding area  The reactions listed above are NORMAL, but you want to call your surgeon if any of these reactions persist.  Symptoms to Report:  Please report any of the following signs to your surgeon:  Signs of infection:  Redness or pain around your incision (if you cannot see your incision, red streaking up your extremity should be reported).  Thick, dark yellow or foul-smelling drainage at the incision site or from your dressing.  Temperature measured over 101.5 degrees for more than 24 hours despite Tylenol.  Signs of Decreased Circulation to the Ankle and Foot:  Coldness of ankle and foot  Foot or leg turning  pale  Tingling/numbness (after the block has fully resolved)  Sustained increases in pain uncontrolled by medication  Toenail bed turns blue in color  Blood Clots:  Although rare, please be aware and utilize the recommendations below to avoid getting a blood clot.  Prevention of deep vein thrombus DVT (blood clots):  Resume Plavix to help prevent blood clot formation.  Get up 4-5 times during the daytime and walk/crutch/walker across the room to keep the blood circulating in your legs. (Maintain any weightbearing restrictions, of course)  Wiggle your toes frequently if you are in a splint or case  Notify your physician if you are a nicotine user, on hormone replacement therapy medications, are taking birth control, or have a history of blood clots as these can increase your risk of developing a blood clot.  Notify your provider if you develop pain or tenderness in your calf muscle    If you have any additional questions, please contact Dr. Trejo's staff the [facility] at [phone]    IF YOU HAVE AN EMERGENCY, i.e., SHORTNESS OF BREATH, CHEST PAIN, OR SYMPTOMS SEVERE IN NATURE, PLEASE CALL [phone] OR VISIT YOUR LOCAL EMERGENCY ROOM   InfuBLOCK - Patient Information    What is a pain pump?  The InfuBLOCK pump delivers post-operative, non-narcotic, numbing medication to the nerve near the surgical site for pain relief.     Where can I find information about my pain pump?           For more information about your pain pump, scan the QR code.  For additional patient resources, visit [email or web address].                                                                                               While your physician is your primary source for information about your treatment there may be times during your treatment that you need assistance with your infusion pump.     If you need assistance take the following steps:    The [employer] Nursing Hotline is Here  for You 24/7.  Please call [phone] for the following concerns or complications:    Answers to questions about your infusion pump                 Tubing disconnect  Assistance with pump alarms                                                      Dislodged catheter  Excessive leakage noted from pump                                         Inadequate pain control    2.   [facility] Anesthesia Acute Pain Service: [phone] is available 24/7 for any further needs or concerns about medication or pain control.     -------------------------------------------------------------------------    Nerve Catheter Removal Instructions  When your device is empty:    Remove your catheter by pulling the dressing off slowly (like you would remove a regular bandage). The catheter should pull right out of the skin.  Check that the BLUE tip is intact.                                                                                     If the catheter is stuck, reposition your   extremity and pull slowly until removed.  *If catheter is HURTING and WON'T come out, stop and call [phone] for further assistance.    Remove medication bag from the black carrying case.  Cut the tubing on right and left side of pump, and discard the medication bag and tubing into garbage.  Place the pump and black carrying case into the plastic bag and then place this into the return box.  Seal box with blue stickers and return to [employer] postal service. THIS IS PRE-PAID POSTAGE.        -------------------------------------------------------------------------    [facility] COLD THERAPY - PATIENT INSTRUCTION SHEET    Cold Compression Therapy for your comfort and rehabilitation  Your caregivers want you to be productive in your rehab and comfortable during your stay. In keeping with those goals, you will be receiving an [facility] Cold Therapy Wrap to help ease post-operative pain and swelling that might keep you from getting back on track! Your SMI Cold Therapy  Wrap is effective and simple-to-use, and you will be encouraged to apply it throughout your hospital stay and at home through the duration of your recovery.    When you are ready to go home  Be sure to take your SMI Cold Therapy Wrap and both sets of Gel Bags with you for continued comfort and use throughout your rehabilitation. If you don't already have them, ask your nurse or aide to retrieve your SMI Gel Bags from the patient freezer.    Home use precautions  Always follow your medical professional's application instructions upon discharge. Your SMI Cold Therapy Wrap and Gel Bags are designed to last for months following your surgery. Never heat the Gel Bags unless specified by your healthcare provider. Supervision is advised when using this product on children or geriatric patients. To avoid danger of suffocation, please keep the outer plastic packaging away from children & pets.    Cold Therapy Instructions  Place Gel Bags in a freezer set ¾ of the way to max temperature for at least (4) hours. For best results, lay the Gel Bags flat and [id number] in the freezer. Once frozen, slide Gel Bags into the gel pouch and secure your wrap to the affected area with the straps.  Gel wraps that have been stored in a freezer for an extended period of time may require a (10) minute period of softening up in a room temperature environment before application.  The gel pouch acts as a protective barrier. NEVER place frozen bags directly onto skin, as this may cause frostbite injury.  The SMI Cold Therapy Wrap is designed to be able to be worm while ambulating. The compression straps can be secured well enough so that the Wrap won't fall off while moving.  Wrap Application Videos can be viewed at [email or web address].  An additional protective barrier such as clothing, a washcloth, hand-towel or pillowcase may be used during prolonged treatment applications.  The Gel-Pouch and Wrap are both Latex-Free and the Gel Bag  ingredients are non toxic.    [facility] Wrap care instructions  The [facility] Cold Therapy Wrap may be hand washed and hung to dry when needed.    [facility] re-order information  Additional [facility] body specific wraps and/or Gel Bags can be re-ordered from [email or web address] or call [phone] ([phone])        Acute Pain Service Patient Instructions  Nerve Catheters with Disposable Infusion Pump    You have had regional anesthesia (“Nerve Block”) with a catheter placed as part of your post-operative pain management plan. Initially, your extremity may be numb and very weak. Protect the affected extremity by avoiding hot, very cold or sharp items while the extremity is numb. As the block wears off it is normal to have a tingling or “pins and needles” sensation.    We will hook up the peripheral nerve catheter to a disposable infusion pump.   You will carry this pump in the bag provided until you remove the catheter.  You will be responsible for maintaining the device, making rate adjustments if necessary and removing the catheter once the infusion is complete.    The disposable infusion pump will initially be set at _______ml/hr. Your Anesthesia provider determines this rate.    The black arrow on the dial will point to the rate the medication is being delivered. Be sure clamp on tubing is open or removed.  Do not tug, tear or kink the catheter or tubing. Be mindful of all connections when getting dressed and ambulating.  Keep site of catheter into the skin clean. Do not get the dressing or infusion device wet.    Once the catheter is removed you may follow the surgeon’s instructions for showering.  You may adjust the rate on your pump if necessary.  If you are having pain, INCREASE the rate by 2ml/hr. Wait about an hour. If you are still having pain you may continue to increase by 2ml/hr once an hour until you get relief. If your extremity is too numb you may DECREASE your rate 2ml/hr and wait about 2 hours.  The key to adjust  your rate will be zip tied to your pump  Do not titrate the rate down too fast; you may get ahead of yourself and find that you are experiencing pain that may be more difficult to get back under control.  You may still take any prescribed pain medication from your doctor if you are experiencing pain. The local anesthetic inside your device has no opioid/narcotic in it.  The catheter may stay in up to 6 days but you will remove the catheter once your infusion is complete, or at your anesthesia provider’s discretion, which could vary anywhere from 2-6 days.  Leaking at the catheter site may occur. If your pain is still well controlled the catheter is still working.   Reinforce the dressing.  When your device is empty, remove your catheter by pulling the dressing off slowly just like you would remove a regular bandage. The catheter should pull right out of the skin.  Check that your BLUE tip is intact and dispose of everything into the garbage.  If the catheter is stuck but not hurting, reposition your extremity and pull slowly until removed.  If catheter is hurting and won’t come out, stop and call the number listed below.   NEVER cut the catheter for any reason.    Anesthesia Acute Pain Service: [phone] (access to an on-call provider 24/7)  If no response after 1 hour, you may call [phone] and ask for Anesthesiologist On-Call.   If medical emergency please call [phone] or go to nearest emergency department.    **Side Effects listed on the back**            Call Anesthesia On-Call Provider IMMEDIATELY for:  (Very Rare but considered medical emergency)   Metallic taste in your mouth   New onset ringing in your ears   Persistent tremors, shakes or seizures   Redness, Pain or swelling at catheter insertion site   A cold, dark/dusky extremity   Severe pain and you can’t move finger/toes      For UPPER extremity nerve catheters:  (Unwanted but common)  *Turn down rate until side effects resolve or call Pain service  number for   assistance   Hoarseness   Shortness of Breath or feel like you can’t take a deep breath  If severe, turn pump to Zero and call Pain Service   Difficulty swallowing   Facial drooping or eyelid drooping on side of catheter      For LOWER extremity nerve catheters:  *Turn down rate until side effects resolve or call Pain Service number for assistance   Excessive numbness in leg/foot/toes 24 hours after initial block   Leg (Quadriceps) Weakness for adductor canals  FALL RISK- Safety First!

## 2025-05-10 VITALS
SYSTOLIC BLOOD PRESSURE: 152 MMHG | WEIGHT: 162 LBS | OXYGEN SATURATION: 97 % | TEMPERATURE: 98.1 F | HEART RATE: 56 BPM | HEIGHT: 70 IN | RESPIRATION RATE: 16 BRPM | DIASTOLIC BLOOD PRESSURE: 73 MMHG | BODY MASS INDEX: 23.19 KG/M2

## 2025-05-10 LAB
ANION GAP SERPL CALCULATED.3IONS-SCNC: 12 MMOL/L (ref 5–15)
BUN SERPL-MCNC: 7 MG/DL (ref 8–23)
BUN/CREAT SERPL: 8.1 (ref 7–25)
CALCIUM SPEC-SCNC: 8.6 MG/DL (ref 8.6–10.5)
CHLORIDE SERPL-SCNC: 92 MMOL/L (ref 98–107)
CO2 SERPL-SCNC: 25 MMOL/L (ref 22–29)
CREAT SERPL-MCNC: 0.86 MG/DL (ref 0.76–1.27)
DEPRECATED RDW RBC AUTO: 43.8 FL (ref 37–54)
EGFRCR SERPLBLD CKD-EPI 2021: 88.6 ML/MIN/1.73
ERYTHROCYTE [DISTWIDTH] IN BLOOD BY AUTOMATED COUNT: 13.6 % (ref 12.3–15.4)
GLUCOSE SERPL-MCNC: 127 MG/DL (ref 65–99)
HCT VFR BLD AUTO: 36.2 % (ref 37.5–51)
HGB BLD-MCNC: 12.5 G/DL (ref 13–17.7)
MCH RBC QN AUTO: 30.3 PG (ref 26.6–33)
MCHC RBC AUTO-ENTMCNC: 34.5 G/DL (ref 31.5–35.7)
MCV RBC AUTO: 87.9 FL (ref 79–97)
PLATELET # BLD AUTO: 209 10*3/MM3 (ref 140–450)
PMV BLD AUTO: 8.8 FL (ref 6–12)
POTASSIUM SERPL-SCNC: 3.9 MMOL/L (ref 3.5–5.2)
RBC # BLD AUTO: 4.12 10*6/MM3 (ref 4.14–5.8)
SODIUM SERPL-SCNC: 129 MMOL/L (ref 136–145)
WBC NRBC COR # BLD AUTO: 7.06 10*3/MM3 (ref 3.4–10.8)

## 2025-05-10 PROCEDURE — 80048 BASIC METABOLIC PNL TOTAL CA: CPT | Performed by: INTERNAL MEDICINE

## 2025-05-10 PROCEDURE — 97166 OT EVAL MOD COMPLEX 45 MIN: CPT

## 2025-05-10 PROCEDURE — 63710000001 ACETAMINOPHEN 325 MG TABLET: Performed by: ORTHOPAEDIC SURGERY

## 2025-05-10 PROCEDURE — A9270 NON-COVERED ITEM OR SERVICE: HCPCS | Performed by: ORTHOPAEDIC SURGERY

## 2025-05-10 PROCEDURE — 63710000001 METOPROLOL SUCCINATE XL 100 MG TABLET SUSTAINED-RELEASE 24 HOUR: Performed by: ORTHOPAEDIC SURGERY

## 2025-05-10 PROCEDURE — 85027 COMPLETE CBC AUTOMATED: CPT | Performed by: ORTHOPAEDIC SURGERY

## 2025-05-10 PROCEDURE — 63710000001 AMLODIPINE 10 MG TABLET: Performed by: ORTHOPAEDIC SURGERY

## 2025-05-10 PROCEDURE — 63710000001 SERTRALINE 100 MG TABLET: Performed by: ORTHOPAEDIC SURGERY

## 2025-05-10 PROCEDURE — 97162 PT EVAL MOD COMPLEX 30 MIN: CPT

## 2025-05-10 PROCEDURE — 63710000001 CLOPIDOGREL 75 MG TABLET: Performed by: ORTHOPAEDIC SURGERY

## 2025-05-10 PROCEDURE — 25010000002 CEFAZOLIN PER 500 MG: Performed by: ORTHOPAEDIC SURGERY

## 2025-05-10 RX ORDER — DOCUSATE SODIUM 100 MG/1
100 CAPSULE, LIQUID FILLED ORAL 2 TIMES DAILY
Qty: 30 CAPSULE | Refills: 0 | Status: SHIPPED | OUTPATIENT
Start: 2025-05-10 | End: 2025-05-25

## 2025-05-10 RX ORDER — OXYCODONE HYDROCHLORIDE 5 MG/1
5 TABLET ORAL EVERY 4 HOURS PRN
Qty: 40 TABLET | Refills: 0 | Status: SHIPPED | OUTPATIENT
Start: 2025-05-10

## 2025-05-10 RX ORDER — ACETAMINOPHEN 500 MG
500 TABLET ORAL EVERY 6 HOURS PRN
Qty: 30 TABLET | Refills: 0 | Status: SHIPPED | OUTPATIENT
Start: 2025-05-10

## 2025-05-10 RX ADMIN — METOPROLOL SUCCINATE 200 MG: 100 TABLET, EXTENDED RELEASE ORAL at 08:36

## 2025-05-10 RX ADMIN — SERTRALINE 100 MG: 100 TABLET, FILM COATED ORAL at 08:36

## 2025-05-10 RX ADMIN — CLOPIDOGREL BISULFATE 75 MG: 75 TABLET, FILM COATED ORAL at 08:36

## 2025-05-10 RX ADMIN — AMLODIPINE BESYLATE 10 MG: 10 TABLET ORAL at 08:36

## 2025-05-10 RX ADMIN — SODIUM CHLORIDE 2 G: 900 INJECTION INTRAVENOUS at 04:16

## 2025-05-10 RX ADMIN — ACETAMINOPHEN 650 MG: 325 TABLET ORAL at 10:21

## 2025-05-10 NOTE — PROGRESS NOTES
"          Orthopaedic Surgery Progress Note    LOS: 0 days   Patient Care Team:  Mali Galeana MD as PCP - General (Internal Medicine)  Victorino Emerson Jr., MD as Referring Physician (Urology)  Huey Zuleta MD as Consulting Physician (Radiation Oncology)  Lex Nicolas MD as Consulting Physician (Gastroenterology)  Veronica Vázquez APRN as Nurse Practitioner (Nurse Practitioner)  Siva France MD as Consulting Physician (Interventional Cardiology)  Catrachito Sena MD as Consulting Physician (Cardiothoracic Surgery)  1 Day Post-Op   Procedure(s):  Left Ankle open reduction internal fixation, possible Smith syndesmotic fixation  Subjective     Interval History:   Resting bed, denies ankle pain, no acute events, no new complaints.    Objective     Vital Signs:  Temp (24hrs), Av.9 °F (36.6 °C), Min:97.5 °F (36.4 °C), Max:98.6 °F (37 °C)    /73 (BP Location: Right arm, Patient Position: Lying)   Pulse 56   Temp 98.1 °F (36.7 °C) (Oral)   Resp 16   Ht 177.8 cm (70\")   Wt 73.5 kg (162 lb)   SpO2 97%   BMI 23.24 kg/m²   Body mass index is 23.24 kg/m².    Labs:  Lab Results (last 24 hours)       Procedure Component Value Units Date/Time    POC Glucose Once [[id number]]  (Abnormal) Collected: 25 1551    Specimen: Blood Updated: 25 1553     Glucose 132 mg/dL     POC Glucose Once [[id number]]  (Normal) Collected: 25 1059    Specimen: Blood Updated: 25 1100     Glucose 121 mg/dL             Physical Exam:  left LE:  splint CDI, compartments soft/compressible above splint   +wiggling toes, sensation decreased 2/2 to nerve block   Palpable DP beneath splint, CR brisk in toes     Assessment/Plan:  1 Day Post-Op status post:  Procedure(s):  Left Ankle open reduction internal fixation, possible Smith syndesmotic fixation    -NWB operative extremity  -Advance diet as tolerated  -Keep splint/operative dressing clean and dry  -DVT ppx, mechanical and chemical, restart " plavix today  -Postoperative antibiotics, complete  -Pain control  -Consult medicine, appreciate recs  -PT/OT  -Elevate operative extremity  -DC planning, DC pending PT, likely later today    Oliver Trejo MD  05/10/25  09:19 EDT

## 2025-05-10 NOTE — THERAPY EVALUATION
Patient Name: Ernesto Vann  : 1945    MRN: [id number]                              Today's Date: 5/10/2025       Admit Date: 2025    Visit Dx:     ICD-10-CM ICD-9-CM   1. Closed fracture of left ankle, initial encounter  S82.892A 824.8     Patient Active Problem List   Diagnosis    Chronic obstructive pulmonary disease    Essential hypertension    Alcohol abuse    History of tobacco abuse    DDD (degenerative disc disease), lumbar    Generalized anxiety disorder    Hyperlipidemia    PVD (peripheral vascular disease)    Neuropathy    Snoring    Dizziness    Fatigue    Weight loss    Hyponatremia    Prostate cancer    S/P prostatectomy, robotic assisted, radical with LNs    Raynaud's phenomenon without gangrene    Critical polytrauma    Hypokalemia    Orbital floor fracture    SAH (subarachnoid hemorrhage)    Seasonal allergies    Subdural hematoma    Syncope    Blurred vision    Irritable bowel syndrome with both constipation and diarrhea    Major depressive disorder, recurrent episode, moderate    Closed fracture of left ankle    Ankle fracture    S/P ORIF (open reduction internal fixation) fracture, left ankle, ORIF Synesmosis     Past Medical History:   Diagnosis Date    Alcohol abuse     Allergic     Anxiety     Benign colon polyp     Cancer     PROSTATE CANCER    Cataract     Chronic obstructive pulmonary disease 2016    COPD (chronic obstructive pulmonary disease)     DDD (degenerative disc disease), lumbar     Depression     Diabetes mellitus     Dyslipidemia     Elevated PSA 2018    GERD (gastroesophageal reflux disease)     Hyperlipidemia     Hypertension     Inguinal hernia     Irritable bowel syndrome with both constipation and diarrhea 5/15/2024    PAD (peripheral artery disease)     Prostate cancer     SAH (subarachnoid hemorrhage) 2021    Tobacco abuse      Past Surgical History:   Procedure Laterality Date    BELPHAROPTOSIS REPAIR Bilateral 2019    COLONOSCOPY   2019    EYE SURGERY      cataract    FEMORAL ARTERY - FEMORAL ARTERY BYPASS GRAFT      FEMORAL ARTERY STENT      INGUINAL HERNIA REPAIR Right     LUMBAR SYMPATHETIC NERVE BLOCK      PROSTATECTOMY N/A 11/22/2016    Procedure: PROSTATECTOMY LAPAROSCOPIC WITH "[employer]INCI ROBOT WITH NODES;  Surgeon: Victorino Emerson Jr., MD;  Location: [facility];  Service:     TONSILLECTOMY      TONSILLECTOMY AND ADENOIDECTOMY      VASECTOMY        General Information       [facility] Name 05/10/25 1137          Physical Therapy Time and Intention    Document Type evaluation  -     Mode of Treatment physical therapy;co-treatment  -       Row Name 05/10/25 1137          General Information    Patient Profile Reviewed yes  -     Prior Level of Function independent:;all household mobility;community mobility;bed mobility;ADL's;gait;transfer  prior to fall/injury  -     Existing Precautions/Restrictions fall;right;non-weight bearing;other (see comments)  s/p R Ankle ORIF with NWB precautions, short leg splint, popliteal nerve catheter, elevate RLE at rest  -     Barriers to Rehab medically complex;previous functional deficit;ineffective coping  -       Row Name 05/10/25 1137          Living Environment    Current Living Arrangements home  -     People in Home spouse  -       Row Name 05/10/25 1137          Home Main Entrance    Number of Stairs, Main Entrance none  -       Row Name 05/10/25 1137          Stairs Within Home, Primary    Number of Stairs, Within Home, Primary none  -       Row Name 05/10/25 1137          Cognition    Orientation Status (Cognition) oriented x 4  -       Row Name 05/10/25 1137          Safety Issues/Impairments Affecting Functional Mobility    Safety Issues Affecting Function (Mobility) insight into deficits/self-awareness;ability to follow commands;awareness of need for assistance;at risk behavior observed;positioning of assistive device;judgment;impulsivity;safety precaution awareness;sequencing  abilities;safety precautions follow-through/compliance  -     Impairments Affecting Function (Mobility) balance;endurance/activity tolerance;strength;sensation/sensory awareness;coordination;motor control;pain  -               User Key  (r) = Recorded By, (t) = Taken By, (c) = Cosigned By      Initials Name Provider Type     Gauri Portillo PT Physical Therapist                   Mobility       Row Name 05/10/25 1138          Bed Mobility    Bed Mobility supine-sit;scooting/bridging  -     Scooting/Bridging [place] (Bed Mobility) modified independence  -     Supine-Sit [place] (Bed Mobility) modified independence  -     Assistive Device (Bed Mobility) head of bed elevated;bed rails  -       Row Name 05/10/25 1138          Transfers    Comment, (Transfers) Pt performed STS and hopped to chair with assistance for AD management and steadiness. Frequent VC for maintaining precautions. Pt demonstrated good strength to maintain precautions though decreased attention to task, following commands, and sequencing ability limited his compliance and safety.  -       Row Name 05/10/25 1138          Bed-Chair Transfer    Bed-Chair [place] (Transfers) minimum assist (75% patient effort);1 person assist;1 person to manage equipment;verbal cues  -     Assistive Device (Bed-Chair Transfers) walker, front-wheeled  -       Row Name 05/10/25 1138          Sit-Stand Transfer    Sit-Stand [place] (Transfers) minimum assist (75% patient effort);1 person assist;1 person to manage equipment;verbal cues  -     Assistive Device (Sit-Stand Transfers) walker, front-wheeled  -       Row Name 05/10/25 1138          Gait/Stairs (Locomotion)    [place] Level (Gait) minimum assist (75% patient effort);2 person assist;verbal cues;nonverbal cues (demo/gesture)  -     Assistive Device (Gait) walker, front-wheeled  -     Distance in Feet (Gait) 6  -HW     Deviations/Abnormal Patterns (Gait) gait speed  decreased;weight shifting decreased  -     Bilateral Gait Deviations forward flexed posture  -     Comment, (Gait/Stairs) Pt amb in straight bath with frequent VC for maintaining precautions. Pt demonstrated good strength to maintain precautions though decreased attention to task, following commands, and sequencing ability limited his compliance and safety. Activity limited by reported back pain during task and fatigue.  -       Row Name 05/10/25 1138          Mobility    Extremity Weight-bearing Status right lower extremity  -               User Key  (r) = Recorded By, (t) = Taken By, (c) = Cosigned By      Initials Name Provider Type     Gauri Portillo, LUCY Physical Therapist                   Obj/Interventions       Row Name 05/10/25 1144          Range of Motion Comprehensive    General Range of Motion lower extremity range of motion deficits identified  -     Comment, General Range of Motion L foot/ankle limited by splint  -[facility] Name 05/10/25 1144          Strength Comprehensive (MMT)    General Manual Muscle Testing (MMT) Assessment lower extremity strength deficits identified  -     Comment, General Manual Muscle Testing (MMT) Assessment BLE grossly 4+/5; formal assessment limited by post-op care  -       Row Name 05/10/25 114          Balance    Balance Assessment sitting static balance;sitting dynamic balance;standing static balance;standing dynamic balance  -     Static Sitting Balance independent  -     Dynamic Sitting Balance independent  -     Position, Sitting Balance sitting edge of bed  -     Static Standing Balance minimal assist  -     Dynamic Standing Balance minimal assist  -     Position/Device Used, Standing Balance supported;walker, rolling  -     Balance Interventions sitting;standing;sit to stand;occupation based/functional task  -       Row Name 05/10/25 1146          Sensory Assessment (Somatosensory)    Sensory Assessment (Somatosensory) other (see  comments)  LLE impaired sensation  -               User Key  (r) = Recorded By, (t) = Taken By, (c) = Cosigned By      Initials Name Provider Type    HW Gauri Portillo PT Physical Therapist                   Goals/Plan       Row Name 05/10/25 1151          Transfer Goal 1 (PT)    Activity/Assistive Device (Transfer Goal 1, PT) sit-to-stand/stand-to-sit;bed-to-chair/chair-to-bed  -HW     [place] Level/Cues Needed (Transfer Goal 1, PT) supervision required  -HW     Time Frame (Transfer Goal 1, PT) long term goal (LTG);10 days  -       Row Name 05/10/25 1151          Gait Training Goal 1 (PT)    Activity/Assistive Device (Gait Training Goal 1, PT) gait (walking locomotion)  -HW     [place] Level (Gait Training Goal 1, PT) supervision required  -HW     Distance (Gait Training Goal 1, PT) 150 in w/c  -HW     Time Frame (Gait Training Goal 1, PT) long term goal (LTG);10 days  -       Row Name 05/10/25 1151          Therapy Assessment/Plan (PT)    Planned Therapy Interventions (PT) balance training;bed mobility training;gait training;home exercise program;patient/family education;ROM (range of motion);strengthening;stretching;transfer training  -               User Key  (r) = Recorded By, (t) = Taken By, (c) = Cosigned By      Initials Name Provider Type    HW Gauri Portillo PT Physical Therapist                   Clinical Impression       Row Name 05/10/25 1144          Pain    Pretreatment Pain Rating 3/10  -     Posttreatment Pain Rating 3/10  -     Pain Location back;ankle  -     Pain Side/Orientation lower;left  -     Pain Management Interventions exercise or physical activity utilized;nursing notified;positioning techniques utilized  -     Response to Pain Interventions activity participation with tolerable pain  -       Row Name 05/10/25 1145          Plan of Care Review    Plan of Care Reviewed With patient  -     Progress no change  -     Outcome Evaluation Pt was able to hop 6'  with FWW and Min A x2. Education provided for safety with w/c transfers. Pt verbalized understanding. Pt demonstrated good strength to maintain precautions though decreased attention to task, following commands, and sequencing ability limited his compliance and safety. Through discussion with spouse, it was confirmed that pt does not have a w/c at home and the plan was to use a rollator as a w/c. Education provided as to why a rollator was not intended for this mode of transportation and was therefore not recommended for home or community mobilization. Recommend a w/c with elevating leg rests. Must have w/c prior to d/c from PT standpoint for safety. CM notified. Recommend d/c home with 24/7 assist and HHPT.  -       Row Name 05/10/25 1145          Therapy Assessment/Plan (PT)    Rehab Potential (PT) good  -     Criteria for Skilled Interventions Met (PT) yes;meets criteria;skilled treatment is necessary  -     Therapy Frequency (PT) daily  -     Predicted Duration of Therapy Intervention (PT) one day  -       Row Name 05/10/25 1145          Positioning and Restraints    Pre-Treatment Position in bed  -     Post Treatment Position chair  -HW     In Chair notified nsg;reclined;sitting;call light within reach;encouraged to call for assist;exit alarm on;with family/caregiver;legs elevated;compression device;LLE elevated  -               User Key  (r) = Recorded By, (t) = Taken By, (c) = Cosigned By      Initials Name Provider Type    HW Gauri Portillo, PT Physical Therapist                   Outcome Measures       Row Name 05/10/25 1151 05/10/25 0836       How much help from another person do you currently need...    Turning from your back to your side while in flat bed without using bedrails? 4  -HW 3  -EA    Moving from lying on back to sitting on the side of a flat bed without bedrails? 4  -HW 3  -EA    Moving to and from a bed to a chair (including a wheelchair)? 3  -HW 2  -EA    Standing up from a  chair using your arms (e.g., wheelchair, bedside chair)? 3  - 2  -EA    Climbing 3-5 steps with a railing? 2  -HW 1  -EA    To walk in hospital room? 2  -HW 2  -EA    AM-PAC 6 Clicks Score (PT) 18  - 13  -EA    Highest Level of Mobility Goal 6 --> Walk 10 steps or more  - 4 --> Transfer to chair/commode  -EA      Row Name 05/10/25 1151          Functional Assessment    Outcome Measure Options AM-PAC 6 Clicks Daily Activity (OT)  Simultaneous filing. User may be unaware of other data.  -               User Key  (r) = Recorded By, (t) = Taken By, (c) = Cosigned By      Initials Name Provider Type    TB Sharon Holland, OT Occupational Therapist     Gauri Portillo, PT Physical Therapist    Lore Mccoy, RN Registered Nurse                                 Physical Therapy Education       Title: PT OT SLP Therapies (In Progress)       Topic: Physical Therapy (In Progress)       Point: Mobility training (Done)       Learning Progress Summary            Patient Acceptance, E,D, VU by  at 5/10/2025 1154                      Point: Home exercise program (Not Started)       Learner Progress:  Not documented in this visit.              Point: Body mechanics (Done)       Learning Progress Summary            Patient Acceptance, E,D, VU by  at 5/10/2025 1154                      Point: Precautions (Done)       Learning Progress Summary            Patient Acceptance, E,D, VU by  at 5/10/2025 1154                                      User Key       Initials Effective Dates Name Provider Type Discipline     12/15/23 -  Gauri Portillo, PT Physical Therapist PT                  PT Recommendation and Plan  Planned Therapy Interventions (PT): balance training, bed mobility training, gait training, home exercise program, patient/family education, ROM (range of motion), strengthening, stretching, transfer training  Progress: no change  Outcome Evaluation: Pt was able to hop 6' with FWW and Min A x2. Education  provided for safety with w/c transfers. Pt verbalized understanding. Pt demonstrated good strength to maintain precautions though decreased attention to task, following commands, and sequencing ability limited his compliance and safety. Through discussion with spouse, it was confirmed that pt does not have a w/c at home and the plan was to use a rollator as a w/c. Education provided as to why a rollator was not intended for this mode of transportation and was therefore not recommended for home or community mobilization. Recommend a w/c with elevating leg rests. Must have w/c prior to d/c from PT standpoint for safety. CM notified. Recommend d/c home with 24/7 assist and HHPT.     Time Calculation:   PT Evaluation Complexity  History, PT Evaluation Complexity: 1-2 personal factors and/or comorbidities  Examination of Body Systems (PT Eval Complexity): total of 3 or more elements  Clinical Presentation (PT Evaluation Complexity): evolving  Clinical Decision Making (PT Evaluation Complexity): moderate complexity  Overall Complexity (PT Evaluation Complexity): moderate complexity     PT Charges       Row Name 05/10/25 1154             Time Calculation    Start Time 1008  -HW      PT Received On 05/10/25  -HW      PT Goal Re-Cert Due Date 05/20/25  -HW         Untimed Charges    PT Eval/Re-eval Minutes 55  -HW         Total Minutes    Untimed Charges Total Minutes 55  -HW       Total Minutes 55  -HW                User Key  (r) = Recorded By, (t) = Taken By, (c) = Cosigned By      Initials Name Provider Type    HW Gauri Portillo PT Physical Therapist                  Therapy Charges for Today       Code Description Service Date Service Provider Modifiers Qty    80445671464 HC PT EVAL MOD COMPLEXITY 4 5/10/2025 Gauri Portillo, PT GP 1            PT G-Codes  Outcome Measure Options: AM-PAC 6 Clicks Daily Activity (OT) (Simultaneous filing. User may be unaware of other data.)  AM-PAC 6 Clicks Score (PT): 18  AM-PAC 6 Clicks  Score (OT): 15  PT Discharge Summary  Anticipated Discharge Disposition (PT): home with 24/7 care, home with home health    Gauri Portillo, PT  5/10/2025

## 2025-05-10 NOTE — PLAN OF CARE
Oriented x 4. LLE elevated to the level of heart as ordered. [place] CDI. Splint in place. Patient denies pain / discomfort at this time. SBP elevated in 170s at the beginning of the shift. Current  /79. Mildly patrice cardiac in mid to high 50s. Voids spontaneously without difficulty. BP closely monitored. Call light in reach.

## 2025-05-10 NOTE — PLAN OF CARE
Goal Outcome Evaluation:  Plan of Care Reviewed With: patient        Progress: no change  Outcome Evaluation: Pt was able to hop 6' with FWW and Min A x2. Education provided for safety with w/c transfers. Pt verbalized understanding. Pt demonstrated good strength to maintain precautions though decreased attention to task, following commands, and sequencing ability limited his compliance and safety. Through discussion with spouse, it was confirmed that pt does not have a w/c at home and the plan was to use a rollator as a w/c. Education provided as to why a rollator was not intended for this mode of transportation and was therefore not recommended for home or community mobilization. Recommend a w/c with elevating leg rests. Must have w/c prior to d/c from PT standpoint for safety. CM notified. Recommend d/c home with 24/7 assist and HHPT.    Anticipated Discharge Disposition (PT): home with 24/7 care, home with home health

## 2025-05-10 NOTE — DISCHARGE SUMMARY
Patient Name: Ernesto Vann  MRN: [id number]  : 1945  DOS: 5/10/2025    Attending: Oliver Trejo MD    Primary Care Provider: Mali Galeana MD    Date of Admission:.2025  9:52 AM    Date of Discharge:  5/10/2025    Discharge Diagnosis:   S/P ORIF (open reduction internal fixation) fracture, left ankle, ORIF Synesmosis    Chronic obstructive pulmonary disease    Essential hypertension    Generalized anxiety disorder    Hyperlipidemia    PVD (peripheral vascular disease)    Neuropathy    Closed fracture of left ankle    Ankle fracture      Hospital Course    At admit:    Patient is a pleasant 79 y.o. male presented for scheduled surgery by Dr. Trejo.     Per his office note (Ernesto Vann is a 79 y.o. male who is here today left ankle fracture.  Seen today previously at urgent care and he came to my office for further management.  Was placed in tall cam walking boot at urgent care.  Suffered mechanical fall about 2 weeks ago injuring left ankle.  Has been walking with assistive devices in shoe with continued pain and swelling since that time.  Of note patient has peripheral artery disease with stent right lower extremity.  Is currently following closely with cardiology as well as vascular surgery.  Currently  taking Plavix for history of stent.).  Patient has been diagnosed with closed fracture of the left ankle.     He underwent the following procedures:  1. ORIF bmalleolar ankle fracture no post mal, 35332  2. ORIF Syndesmosis, 02291  3. Stress fluoroscopy ankle 38050 and 26880  4. Application short leg splint, 47783  Surgery was done under general anesthesia and a block, was tolerated well, is admitted for further management.     Seen in PACU, doing fairly well, good pain control, no complains of nausea, vomiting, or shortness of breath.     Reviewed patient's past medical history which is quite extensive, reviewed patient's medications.      After admit:    Patient was provided pain  medications as needed for pain control, along with popliteal nerve block infusion of Ropivacaine- out prior to discharge.  Adjustments were made to pain medications to optimize postop pain management.   Risks and benefits of opiate medications discussed with patient.  Juan Jose report in chart was reviewed prior to discharge.    He was seen by PT has progressed well over his stay. PT did recommend that patient has a wheelchair prior to discharge. However, CM is unable to obtain a wheelchair on the weekend. Discussed with Dr. Trejo. Patient's son at bedside is able to drive to [place] to a [employer] supply store and obtain a wheelchair this weekend. It is felt there is no reason to keep the patient in the hospital all weekend waiting for a wheelchair. The patient verbalized understanding of weight-bearing precautions and consequences of non-compliance.    He used an IS for atelectasis prophylaxis and mechanicals for DVT prophylaxis.  Home medications were resumed as appropriate, and labs were monitored and remained fairly stable.     With the progress he has made, he is ready for discharge home today.    Keep splint clean and dry until follow up appointment with Dr. Trejo.  Discussed with patient regarding plan and he shows understanding and agreement.     Procedures Performed    Pre-op Diagnosis:   Left ankle bimalleolar fracture, syndesmotic injury     Post-op Diagnosis:      Same     Procedure/CPT® Codes:  1. ORIF bmalleolar ankle fracture no post mal, 33571  2. ORIF Syndesmosis, 80649  3. Stress fluoroscopy ankle 16126 and 13199  4. Application short leg splint, 51403     Staff:  Surgeon(s):  Oliver Trejo MD    Pertinent Test Results:    I reviewed the patient's new clinical results.   Results from last 7 days   Lab Units 05/10/25  1201   WBC 10*3/mm3 7.06   HEMOGLOBIN g/dL 12.5*   HEMATOCRIT % 36.2*   PLATELETS 10*3/mm3 209     Results from last 7 days   Lab Units 05/10/25  1201   SODIUM mmol/L 129*   POTASSIUM  "mmol/L 3.9   CHLORIDE mmol/L 92*   CO2 mmol/L 25.0   BUN mg/dL 7*   CREATININE mg/dL 0.86   CALCIUM mg/dL 8.6   GLUCOSE mg/dL 127*       I reviewed the patient's new imaging including images and reports.      Physical therapy: Pt was able to hop 6' with FWW and Min A x2. Education provided for safety with w/c transfers. Pt verbalized understanding. Pt demonstrated good strength to maintain precautions though decreased attention to task, following commands, and sequencing ability limited his compliance and safety. Through discussion with spouse, it was confirmed that pt does not have a w/c at home and the plan was to use a rollator as a w/c. Education provided as to why a rollator was not intended for this mode of transportation and was therefore not recommended for home or community mobilization. Recommend a w/c with elevating leg rests. Must have w/c prior to d/c from PT standpoint for safety. CM notified. Recommend d/c home with 24/7 assist and HHPT.     Discharge Assessment:    Vital Signs  Visit Vitals  /73 (BP Location: Right arm, Patient Position: Lying)   Pulse 56   Temp 98.1 °F (36.7 °C) (Oral)   Resp 16   Ht 177.8 cm (70\")   Wt 73.5 kg (162 lb)   SpO2 97%   BMI 23.24 kg/m²     Temp (24hrs), Av °F (36.7 °C), Min:97.7 °F (36.5 °C), Max:98.6 °F (37 °C)      General Appearance:    Alert, cooperative, in no acute distress   Lungs:     Clear to auscultation,respirations regular, even and unlabored    Heart:    Regular rhythm and normal rate, normal S1 and S2   Abdomen:     Normal bowel sounds, no masses, no organomegaly, soft non-tender, non-distended, no guarding, no rebound tenderness   Extremities:   LLE splint CDI. Nerve block present. Good cap refill and movement left toes.    Pulses:   Pulses palpable and equal bilaterally   Skin:   No bleeding, bruising or rash   Neurologic:   Cranial nerves 2 - 12 grossly intact, sensation intact       Discharge Disposition: Home    Discharge Medications   "   Discharge Medications        New Medications        Instructions Start Date   acetaminophen 500 MG tablet  Commonly known as: TYLENOL  Replaces: Acetaminophen 500 MG capsule   500 mg, Oral, Every 6 Hours PRN      docusate sodium 100 MG capsule  Commonly known as: Colace   100 mg, Oral, 2 Times Daily      oxyCODONE 5 MG immediate release tablet  Commonly known as: Roxicodone   5 mg, Oral, Every 4 Hours PRN             Continue These Medications        Instructions Start Date   amLODIPine 10 MG tablet  Commonly known as: NORVASC   10 mg, Oral, Daily      atorvastatin 20 MG tablet  Commonly known as: LIPITOR   20 mg, Oral, Daily      clopidogrel 75 MG tablet  Commonly known as: PLAVIX   TAKE 1 TABLET BY MOUTH ONCE DAILY      metoprolol succinate  MG 24 hr tablet  Commonly known as: TOPROL-XL   200 mg, Oral, Daily      QUEtiapine 50 MG tablet  Commonly known as: SEROquel   50 mg, Oral, Nightly      sertraline 100 MG tablet  Commonly known as: Zoloft   Take one 50 mg tablet by mouth daily for two weeks, then take one 100 mg tablet by mouth daily for two weeks, after four weeks, increase to one 50 mg tablet by mouth daily along with one 100 mg tablet by mouth daily to equal 150 mg by mouth daily      sertraline 50 MG tablet  Commonly known as: Zoloft   Take one 50 mg tablet by mouth daily for two weeks, then take one 100 mg tablet by mouth daily for two weeks, after four weeks, increase to one 50 mg tablet by mouth daily along with one 100 mg tablet by mouth daily to equal 150 mg by mouth daily      solifenacin 10 MG tablet  Commonly known as: VESICARE   1 tablet, Daily             Stop These Medications      Acetaminophen 500 MG capsule  Replaced by: acetaminophen 500 MG tablet              Discharge Diet: Regular diet    Activity at Discharge: MARTINA MCMAHONE    Follow-up Appointments  Dr. Trejo per his orders      LUCY Mills  05/10/25  13:38 EDT

## 2025-05-10 NOTE — THERAPY DISCHARGE NOTE
Acute Care - Occupational Therapy Discharge  [facility]    Patient Name: Ernesto Vann  : 1945    MRN: [id number]                              Today's Date: 5/10/2025       Admit Date: 2025    Visit Dx:     ICD-10-CM ICD-9-CM   1. Closed fracture of left ankle, initial encounter  S82.892A 824.8     Patient Active Problem List   Diagnosis    Chronic obstructive pulmonary disease    Essential hypertension    Alcohol abuse    History of tobacco abuse    DDD (degenerative disc disease), lumbar    Generalized anxiety disorder    Hyperlipidemia    PVD (peripheral vascular disease)    Neuropathy    Snoring    Dizziness    Fatigue    Weight loss    Hyponatremia    Prostate cancer    S/P prostatectomy, robotic assisted, radical with LNs    Raynaud's phenomenon without gangrene    Critical polytrauma    Hypokalemia    Orbital floor fracture    SAH (subarachnoid hemorrhage)    Seasonal allergies    Subdural hematoma    Syncope    Blurred vision    Irritable bowel syndrome with both constipation and diarrhea    Major depressive disorder, recurrent episode, moderate    Closed fracture of left ankle    Ankle fracture    S/P ORIF (open reduction internal fixation) fracture, left ankle, ORIF Synesmosis     Past Medical History:   Diagnosis Date    Alcohol abuse     Allergic     Anxiety     Benign colon polyp     Cancer     PROSTATE CANCER    Cataract     Chronic obstructive pulmonary disease 2016    COPD (chronic obstructive pulmonary disease)     DDD (degenerative disc disease), lumbar     Depression     Diabetes mellitus     Dyslipidemia     Elevated PSA 2018    GERD (gastroesophageal reflux disease)     Hyperlipidemia     Hypertension     Inguinal hernia     Irritable bowel syndrome with both constipation and diarrhea 5/15/2024    PAD (peripheral artery disease)     Prostate cancer     SAH (subarachnoid hemorrhage) 2021    Tobacco abuse      Past Surgical History:   Procedure Laterality Date     BELPHAROPTOSIS REPAIR Bilateral 09/01/2019    COLONOSCOPY  2019    EYE SURGERY      cataract    FEMORAL ARTERY - FEMORAL ARTERY BYPASS GRAFT      FEMORAL ARTERY STENT      INGUINAL HERNIA REPAIR Right     LUMBAR SYMPATHETIC NERVE BLOCK      PROSTATECTOMY N/A 11/22/2016    Procedure: PROSTATECTOMY LAPAROSCOPIC WITH DAVINCI ROBOT WITH NODES;  Surgeon: Victorino Emerson Jr., MD;  Location: [facility] OR;  Service:     TONSILLECTOMY      TONSILLECTOMY AND ADENOIDECTOMY      VASECTOMY        General Information       Row Name 05/10/25 1130          OT Time and Intention    Subjective Information no complaints  -TB     Document Type evaluation  -TB     Mode of Treatment occupational therapy;co-treatment  -TB     Patient Effort adequate  -TB     Symptoms Noted During/After Treatment none  -TB       Row Name 05/10/25 1130          General Information    Patient Profile Reviewed yes  -TB     Prior Level of Function independent:;all household mobility;community mobility;ADL's  prior to fall  -TB     Existing Precautions/Restrictions fall;non-weight bearing;other (see comments);left  s/p L Ankle ORIF with NWB precautions, short leg splint, popliteal nerve catheter, elevate LLE at rest  -TB     Barriers to Rehab medically complex;impaired sensation;ineffective coping  -TB       Row Name 05/10/25 1130          Occupational Profile    Reason for Services/Referral (Occupational Profile) Occupational decline  -TB     Environmental Supports and Barriers (Occupational Profile) Pt lives with his spouse. Son has come in from [place] to assist as needed. Pt has RW and rollator. Will need w/c for d/c.  -TB       Row Name 05/10/25 1130          Living Environment    Current Living Arrangements home  -TB     People in Home spouse  -TB       Row Name 05/10/25 1130          Cognition    Orientation Status (Cognition) oriented x 4  -TB       Row Name 05/10/25 1130          Safety Issues/Impairments Affecting Functional Mobility    Safety  Issues Affecting Function (Mobility) insight into deficits/self-awareness;awareness of need for assistance;safety precaution awareness;safety precautions follow-through/compliance;judgment;sequencing abilities;unable to maintain weight-bearing restrictions  -TB     Impairments Affecting Function (Mobility) balance;endurance/activity tolerance;strength;sensation/sensory awareness  -TB               User Key  (r) = Recorded By, (t) = Taken By, (c) = Cosigned By      Initials Name Provider Type    TB Sharon Holland OT Occupational Therapist                   Mobility/ADL's       Row Name 05/10/25 1134          Bed Mobility    Bed Mobility supine-sit;scooting/bridging  -TB     Scooting/Bridging [place] (Bed Mobility) modified independence  -TB     Supine-Sit [place] (Bed Mobility) modified independence  -TB     Bed Mobility, Safety Issues decreased use of legs for bridging/pushing  -TB     Assistive Device (Bed Mobility) head of bed elevated;bed rails  -TB       Row Name 05/10/25 1134          Transfers    Transfers sit-stand transfer;stand-sit transfer;bed-chair transfer  -TB     Comment, (Transfers) Education and cues for RLE NWB precations, hand placement, and sequencing with each transfer. Pt presents with difficulty maintaining precautions.  -TB       Row Name 05/10/25 1134          Bed-Chair Transfer    Bed-Chair [place] (Transfers) minimum assist (75% patient effort);1 person assist;1 person to manage equipment;verbal cues  -TB     Assistive Device (Bed-Chair Transfers) walker, front-wheeled  -TB       Row Name 05/10/25 1134          Sit-Stand Transfer    Sit-Stand [place] (Transfers) minimum assist (75% patient effort);1 person assist;1 person to manage equipment;verbal cues  -TB     Assistive Device (Sit-Stand Transfers) walker, front-wheeled  -TB       Row Name 05/10/25 1134          Stand-Sit Transfer    Stand-Sit [place] (Transfers) minimum assist (75% patient effort);1  "person assist;verbal cues  -TB     Assistive Device (Stand-Sit Transfers) walker, front-wheeled  -TB       Row Name 05/10/25 1134          Functional Mobility    Functional Mobility- Ind. Level minimum assist (75% patient effort);1 person + 1 person to manage equipment  -TB     Functional Mobility- Device walker, front-wheeled  -TB     Functional Mobility-Maintain WBing assist to maintain weight bearing status;cues to maintain weight bearing status  -TB     Functional Mobility- Safety Issues sequencing ability decreased  -TB     Functional Mobility- Comment Pt able to take several forward hopping steps with assist to maintain LLE NWB precautions. Pt reports, \"I won't be doing that.\" Fatigues easily.  -TB     Patient was able to Ambulate yes  -TB       Row Name 05/10/25 1134          Activities of Daily Living    BADL Assessment/Intervention bathing;upper body dressing;lower body dressing;toileting  -TB       Row Name 05/10/25 1134          Mobility    Extremity Weight-bearing Status left lower extremity  -TB     Left Lower Extremity (Weight-bearing Status) non weight-bearing (NWB)   -TB     Right Lower Extremity (Weight-bearing Status) --  -TB       Row Name 05/10/25 1134          Bathing Assessment/Intervention    [place] Level (Bathing) dependent (less than 25% patient effort);distal lower extremities/feet  -TB     Position (Bathing) supported sitting  -TB     Comment, (Bathing) following incontinent episodes x2  -TB       Row Name 05/10/25 1134          Upper Body Dressing Assessment/Training    [place] Level (Upper Body Dressing) don;pajama/robe;set up  -TB     Position (Upper Body Dressing) edge of bed sitting  -TB       Row Name 05/10/25 1134          Lower Body Dressing Assessment/Training    [place] Level (Lower Body Dressing) don;shoes/slippers;set up  -TB     Position (Lower Body Dressing) edge of bed sitting  -TB       Row Name 05/10/25 1134          Toileting Assessment/Training    " [place] Level (Toileting) dependent (less than 25% patient effort)  -TB     Comment, (Toileting) Incontinent  -TB               User Key  (r) = Recorded By, (t) = Taken By, (c) = Cosigned By      Initials Name Provider Type    TB Sharon Holland, OT Occupational Therapist                   Obj/Interventions       Row Name 05/10/25 1138          Sensory Assessment (Somatosensory)    Sensory Assessment (Somatosensory) UE sensation intact  -       Row Name 05/10/25 1138          Vision Assessment/Intervention    Visual Impairment/Limitations WFL  -TB       Row Name 05/10/25 1138          Range of Motion Comprehensive    General Range of Motion bilateral upper extremity ROM WNL  -TB     Comment, General Range of Motion BUE AROM intact  -TB       Row Name 05/10/25 1138          Strength Comprehensive (MMT)    Comment, General Manual Muscle Testing (MMT) Assessment Generalized weakness, limited endurance. BUE 5/5  -TB       Row Name 05/10/25 1138          Balance    Balance Assessment sitting dynamic balance;sit to stand dynamic balance;standing static balance;standing dynamic balance  -TB     Dynamic Sitting Balance supervision  -TB     Position, Sitting Balance unsupported;sitting in chair;sitting edge of bed  -TB     Sit to Stand Dynamic Balance minimal assist;1-person assist;1 person to manage equipment  -TB     Static Standing Balance minimal assist;1-person assist;verbal cues  -TB     Dynamic Standing Balance minimal assist;1-person assist;1 person to manage equipment;verbal cues  -TB     Position/Device Used, Standing Balance walker, front-wheeled  -TB     Balance Interventions sitting;standing;sit to stand;supported;static;dynamic;dynamic reaching;occupation based/functional task  -TB     Comment, Balance Unsteady without LOB when up with RW support and Min Ax1. Difficulty maintaining LLE NWB precautions.  -TB               User Key  (r) = Recorded By, (t) = Taken By, (c) = Cosigned By      Initials  Name Provider Type    TB Sharon Holland, OT Occupational Therapist                   Goals/Plan    No documentation.                  Clinical Impression       Row Name 05/10/25 1141          Pain Assessment    Pretreatment Pain Rating 0/10 - no pain  -TB     Posttreatment Pain Rating 0/10 - no pain  -TB     Pain Management Interventions other (see comments)  popliteal nerve catheter infusing. Pt reports densely numb.  -TB     Response to Pain Interventions activity level improved  -TB       Row Name 05/10/25 1141          Plan of Care Review    Plan of Care Reviewed With patient;spouse  -TB     Progress --  IE  -TB     Outcome Evaluation OT IE completed. Pt is A/Ox4 and participates in therapy with encouragment/easily frustrated. BUE AROM, strength, and sensation are intact for ADL performance and AD use. Education reinforced for LLE NWB precautions. Pt able to maintain with cuing during stand pvt transfers. Unable to maintain with hopping steps. Up with RW support and Min Ax1+1. Family encouraged to take gait belt home for safety with transfers and extensive teaching completed for importance of maintaining NWB precautions. Pt is adamant that he d/c home today. OT will sign off, but recommend pt have 24/7 assist for safety, fall prevention, and NWB compliance. CM is working to order appropriate w/c for safe d/c to home.  -TB       Row Name 05/10/25 1141          Therapy Assessment/Plan (OT)    Therapy Frequency (OT) evaluation only  -TB       Row Name 05/10/25 1141          Therapy Plan Review/Discharge Plan (OT)    Anticipated Discharge Disposition (OT) home with 24/7 care;other (see comments)  w/c level  -TB       Row Name 05/10/25 1141          Vital Signs    Pre Systolic BP Rehab --  RN cleared OT  -TB     O2 Delivery Pre Treatment room air  -TB     Pre Patient Position Supine  -TB     Intra Patient Position Sitting  -TB     Post Patient Position Sitting  -TB       Row Name 05/10/25 1141           Positioning and Restraints    Pre-Treatment Position in bed  -TB     Post Treatment Position chair  -TB     In Chair notified nsg;reclined;call light within reach;encouraged to call for assist;exit alarm on;legs elevated;LLE elevated  -TB               User Key  (r) = Recorded By, (t) = Taken By, (c) = Cosigned By      Initials Name Provider Type    TB Sharon Holland, OT Occupational Therapist                   Outcome Measures       Row Name 05/10/25 1151          How much help from another is currently needed...    Putting on and taking off regular lower body clothing? 2  -TB     Bathing (including washing, rinsing, and drying) 2  -TB     Toileting (which includes using toilet bed pan or urinal) 1  -TB     Putting on and taking off regular upper body clothing 3  -TB     Taking care of personal grooming (such as brushing teeth) 3  -TB     Eating meals 4  -TB     AM-PAC 6 Clicks Score (OT) 15  -TB       Row Name 05/10/25 1151 05/10/25 0836       How much help from another person do you currently need...    Turning from your back to your side while in flat bed without using bedrails? 4  -HW 3  -EA    Moving from lying on back to sitting on the side of a flat bed without bedrails? 4  -HW 3  -EA    Moving to and from a bed to a chair (including a wheelchair)? 3  -HW 2  -EA    Standing up from a chair using your arms (e.g., wheelchair, bedside chair)? 3  -HW 2  -EA    Climbing 3-5 steps with a railing? 2  -HW 1  -EA    To walk in hospital room? 2  -HW 2  -EA    AM-PAC 6 Clicks Score (PT) 18  -HW 13  -EA    Highest Level of Mobility Goal 6 --> Walk 10 steps or more  -HW 4 --> Transfer to chair/commode  -EA      Row Name 05/10/25 1151          Functional Assessment    Outcome Measure Options AM-PAC 6 Clicks Daily Activity (OT)  Simultaneous filing. User may be unaware of other data.  -TB               User Key  (r) = Recorded By, (t) = Taken By, (c) = Cosigned By      Initials Name Provider Type    TB [place]  Sharon Shultz, OT Occupational Therapist    Gauri Angel, PT Physical Therapist    Lore Mccoy, RN Registered Nurse                  Occupational Therapy Education       Title: PT OT SLP Therapies (In Progress)       Topic: Occupational Therapy (In Progress)       Point: ADL training (Done)       Learning Progress Summary            Patient Acceptance, E,D, VU,NR by TB at 5/10/2025 1152    Comment: Recommend home with 24/7 assist for safety, fall prevention, and NWB precaution compliance.                      Point: Precautions (Done)       Learning Progress Summary            Patient Acceptance, E,D, VU,NR by TB at 5/10/2025 1152    Comment: Recommend home with 24/7 assist for safety, fall prevention, and NWB precaution compliance.                                      User Key       Initials Effective Dates Name Provider Type Discipline     07/11/23 -  Sharon Holland OT Occupational Therapist OT                  OT Recommendation and Plan  Therapy Frequency (OT): evaluation only  Plan of Care Review  Plan of Care Reviewed With: patient, spouse  Progress:  (IE)  Outcome Evaluation: OT IE completed. Pt is A/Ox4 and participates in therapy with encouragment/easily frustrated. BUE AROM, strength, and sensation are intact for ADL performance and AD use. Education reinforced for LLE NWB precautions. Pt able to maintain with cuing during stand pvt transfers. Unable to maintain with hopping steps. Up with RW support and Min Ax1+1. Family encouraged to take gait belt home for safety with transfers and extensive teaching completed for importance of maintaining NWB precautions. Pt is adamant that he d/c home today. OT will sign off, but recommend pt have 24/7 assist for safety, fall prevention, and NWB compliance. CM is working to order appropriate w/c for safe d/c to home.  Plan of Care Reviewed With: patient, spouse  Outcome Evaluation: OT IE completed. Pt is A/Ox4 and participates in therapy with  encouragment/easily frustrated. BUE AROM, strength, and sensation are intact for ADL performance and AD use. Education reinforced for LLE NWB precautions. Pt able to maintain with cuing during stand pvt transfers. Unable to maintain with hopping steps. Up with RW support and Min Ax1+1. Family encouraged to take gait belt home for safety with transfers and extensive teaching completed for importance of maintaining NWB precautions. Pt is adamant that he d/c home today. OT will sign off, but recommend pt have 24/7 assist for safety, fall prevention, and NWB compliance. CM is working to order appropriate w/c for safe d/c to home.     Time Calculation:   Evaluation Complexity (OT)  Review Occupational Profile/Medical/Therapy History Complexity: expanded/moderate complexity  Assessment, Occupational Performance/Identification of Deficit Complexity: 3-5 performance deficits  Clinical Decision Making Complexity (OT): detailed assessment/moderate complexity  Overall Complexity of Evaluation (OT): moderate complexity     Time Calculation- OT       Row Name 05/10/25 1009             Time Calculation- OT    OT Start Time 1009  -TB      OT Received On 05/10/25  -TB         Untimed Charges    OT Eval/Re-eval Minutes 53  -TB         Total Minutes    Untimed Charges Total Minutes 53  -TB       Total Minutes 53  -TB                User Key  (r) = Recorded By, (t) = Taken By, (c) = Cosigned By      Initials Name Provider Type    TB Sharon Holland OT Occupational Therapist                  Therapy Charges for Today       Code Description Service Date Service Provider Modifiers Qty    [id number]  OT EVAL MOD COMPLEXITY 4 5/10/2025 Sharon Holland OT GO 1               OT Discharge Summary  Anticipated Discharge Disposition (OT): home with 24/7 care, other (see comments) (w/c level)    Sharon Holland OT  5/10/2025

## 2025-05-10 NOTE — PROGRESS NOTES
[facility]    Acute pain service Inpatient Progress Note    Patient Name: Ernesto Vann  :  1945  MRN:  [id number]        Acute Pain  Service Inpatient Progress Note:    Analgesia:Excellent  Pain Score:0/10  LOC: alert and awake  Resp Status: room air  Cardiac: VS stable  Side Effects:None  Catheter Site:clean and dry  Comments: Pt seen and evaluated on pain rounds. His PN catheter was accidentally dislodged, however, pt is not complaining of pain and overall doing well. Will sign off. Please call with any questions or concerns.

## 2025-05-10 NOTE — PLAN OF CARE
Problem: Adult Inpatient Plan of Care  Goal: Plan of Care Review  Recent Flowsheet Documentation  Taken 5/10/2025 1141 by Sharon Holland OT  Progress: (IE) --  Outcome Evaluation: OT IE completed. Pt is A/Ox4 and participates in therapy with encouragment/easily frustrated. BUE AROM, strength, and sensation are intact for ADL performance and AD use. Education reinforced for LLE NWB precautions. Pt able to maintain with cuing during stand pvt transfers. Unable to maintain with hopping steps. Up with RW support and Min Ax1+1. Family encouraged to take gait belt home for safety with transfers and extensive teaching completed for importance of maintaining NWB precautions. Pt is adamant that he d/c home today. OT will sign off, but recommend pt have 24/7 assist for safety, fall prevention, and NWB compliance. CM is working to order appropriate w/c for safe d/c to home.

## 2025-05-21 ENCOUNTER — OFFICE VISIT (OUTPATIENT)
Dept: INTERNAL MEDICINE | Facility: CLINIC | Age: 80
End: 2025-05-21
Payer: MEDICARE

## 2025-05-21 ENCOUNTER — LAB (OUTPATIENT)
Dept: LAB | Facility: HOSPITAL | Age: 80
End: 2025-05-21
Payer: MEDICARE

## 2025-05-21 VITALS
BODY MASS INDEX: 21.9 KG/M2 | SYSTOLIC BLOOD PRESSURE: 130 MMHG | HEIGHT: 70 IN | OXYGEN SATURATION: 97 % | RESPIRATION RATE: 16 BRPM | WEIGHT: 153 LBS | DIASTOLIC BLOOD PRESSURE: 70 MMHG | HEART RATE: 57 BPM

## 2025-05-21 DIAGNOSIS — D64.9 LOW HEMOGLOBIN: ICD-10-CM

## 2025-05-21 DIAGNOSIS — H02.402 DROOPING EYELID, LEFT: ICD-10-CM

## 2025-05-21 DIAGNOSIS — E87.1 HYPONATREMIA: ICD-10-CM

## 2025-05-21 DIAGNOSIS — Z98.890 S/P ORIF (OPEN REDUCTION INTERNAL FIXATION) FRACTURE: Primary | ICD-10-CM

## 2025-05-21 DIAGNOSIS — Z87.81 S/P ORIF (OPEN REDUCTION INTERNAL FIXATION) FRACTURE: Primary | ICD-10-CM

## 2025-05-21 LAB
DEPRECATED RDW RBC AUTO: 40.7 FL (ref 37–54)
ERYTHROCYTE [DISTWIDTH] IN BLOOD BY AUTOMATED COUNT: 13 % (ref 12.3–15.4)
HCT VFR BLD AUTO: 36.6 % (ref 37.5–51)
HGB BLD-MCNC: 13 G/DL (ref 13–17.7)
MCH RBC QN AUTO: 30.7 PG (ref 26.6–33)
MCHC RBC AUTO-ENTMCNC: 35.5 G/DL (ref 31.5–35.7)
MCV RBC AUTO: 86.5 FL (ref 79–97)
OSMOLALITY SERPL: 263 MOSM/KG (ref 280–301)
PLATELET # BLD AUTO: 288 10*3/MM3 (ref 140–450)
PMV BLD AUTO: 8.7 FL (ref 6–12)
RBC # BLD AUTO: 4.23 10*6/MM3 (ref 4.14–5.8)
WBC NRBC COR # BLD AUTO: 5.61 10*3/MM3 (ref 3.4–10.8)

## 2025-05-21 PROCEDURE — 85027 COMPLETE CBC AUTOMATED: CPT

## 2025-05-21 PROCEDURE — 84300 ASSAY OF URINE SODIUM: CPT

## 2025-05-21 PROCEDURE — 83935 ASSAY OF URINE OSMOLALITY: CPT

## 2025-05-21 PROCEDURE — 83930 ASSAY OF BLOOD OSMOLALITY: CPT

## 2025-05-21 PROCEDURE — 80053 COMPREHEN METABOLIC PANEL: CPT

## 2025-05-21 NOTE — PROGRESS NOTES
Office Note     Name: Ernesto Vann    : 1945     MRN: [id number]     Chief Complaint  Hospital Follow Up Visit    Subjective     History of Present Illness:  Ernesto Vann is a 79 y.o. male who presents today for hospital follow up s/p- open reduction internal fixation of left ankle, syndesmotic fixation - with Dr. Trejo     Over the last several years has been having hyponatremia   Low hemoglobin   Constantly cold   Fatigue and cold   Left eyelid drooping     Needs blood work rechecked  Working on alcohol cessation/reduction   3 glasses of wine   Coffee, tea, coke zero - no water.     Does have follow up with Dr. Trejo     Eyelid drooping- will schedule with Dr. Fernandez- new onset over the last few months.     Colonoscopy completed - stable.- some polyps- CSGA   5 year callback     Does have follow up Vascular   Will follow up with Dr. Fernandez - neurology     Review of Systems:   Review of Systems   Constitutional:  Positive for fatigue. Negative for chills and fever.   HENT:  Negative for trouble swallowing and voice change.    Eyes:  Negative for visual disturbance.        Eyelid drooping   Respiratory:  Negative for cough, chest tightness, shortness of breath and wheezing.    Cardiovascular:  Negative for chest pain, palpitations and leg swelling.   Gastrointestinal:  Negative for abdominal pain, blood in stool, constipation, diarrhea, nausea and vomiting.   Genitourinary:  Negative for dysuria.   Musculoskeletal:  Positive for gait problem.        Recent ankle injury s/p surgery    Neurological:  Positive for dizziness, weakness and memory problem. Negative for seizures, speech difficulty, light-headedness and headache.   Psychiatric/Behavioral:  Positive for dysphoric mood.        Past Medical History:   Past Medical History:   Diagnosis Date    Alcohol abuse     Allergic     Anxiety     Benign colon polyp     Cancer     PROSTATE CANCER    Cataract     Chronic obstructive pulmonary  disease 06/17/2016    COPD (chronic obstructive pulmonary disease)     DDD (degenerative disc disease), lumbar     Depression     Diabetes mellitus     Dyslipidemia     Elevated PSA 12/2018    GERD (gastroesophageal reflux disease)     Hyperlipidemia     Hypertension     Inguinal hernia     Irritable bowel syndrome with both constipation and diarrhea 5/15/2024    PAD (peripheral artery disease)     Prostate cancer     SAH (subarachnoid hemorrhage) 06/30/2021    Tobacco abuse        Past Surgical History:   Past Surgical History:   Procedure Laterality Date    ANKLE OPEN REDUCTION INTERNAL FIXATION Left 5/9/2025    Procedure: OPEN REDUCTION INTERNAL FIXATION OF LEFT ANKLE, SYNDESMOTIC FIXATION;  Surgeon: Oliver Trejo MD;  Location:  [facility];  Service: Orthopedics;  Laterality: Left;    BELPHAROPTOSIS REPAIR Bilateral 09/01/2019    COLONOSCOPY  2019    EYE SURGERY      cataract    FEMORAL ARTERY - FEMORAL ARTERY BYPASS GRAFT      FEMORAL ARTERY STENT      INGUINAL HERNIA REPAIR Right     LUMBAR SYMPATHETIC NERVE BLOCK      PROSTATECTOMY N/A 11/22/2016    Procedure: PROSTATECTOMY LAPAROSCOPIC WITH Describe[employer] ROBOT WITH NODES;  Surgeon: Victorino Emerson Jr., MD;  Location:  [facility];  Service:     TONSILLECTOMY      TONSILLECTOMY AND ADENOIDECTOMY      VASECTOMY         Immunizations:   Immunization History   Administered Date(s) Administered    COVID-19 (MODERNA) 12YRS+ (SPIKEVAX) 12/06/2023    COVID-19 (PFIZER) 12YRS+ (COMIRNATY) 10/09/2024    COVID-19 (PFIZER) Purple Cap Monovalent 03/04/2021, 03/28/2021, 10/21/2021    Covid-19 (Pfizer) Gray Cap Monovalent 08/19/2022    Fluzone High-Dose 65+YRS 09/22/2016, 10/03/2017, 10/01/2018, 10/02/2019, 10/24/2020, 10/09/2024    Fluzone High-Dose 65+yrs 10/24/2020, 11/29/2021, 09/21/2022, 10/19/2023    PPD Test 09/19/2016    Pneumococcal Conjugate 13-Valent (PCV13) 03/14/2018, 10/21/2021    Pneumococcal Polysaccharide (PPSV23) 03/03/2019    Shingrix 03/16/2018, 08/28/2018     TD Preservative Free (Tenivac) 01/11/2023    Tdap 04/28/2021, 06/29/2021    Zostavax 09/01/2017        Medications:     Current Outpatient Medications:     acetaminophen (TYLENOL) 500 MG tablet, Take 1 tablet by mouth Every 6 (Six) Hours As Needed for Mild Pain., Disp: 30 tablet, Rfl: 0    amLODIPine (NORVASC) 10 MG tablet, Take 1 tablet by mouth Daily., Disp: 90 tablet, Rfl: 1    atorvastatin (LIPITOR) 20 MG tablet, Take 1 tablet by mouth Daily., Disp: 90 tablet, Rfl: 1    clopidogrel (PLAVIX) 75 MG tablet, TAKE 1 TABLET BY MOUTH ONCE DAILY, Disp: 90 tablet, Rfl: 1    docusate sodium (Colace) 100 MG capsule, Take 1 capsule by mouth 2 (Two) Times a Day for 15 days., Disp: 30 capsule, Rfl: 0    metoprolol succinate XL (TOPROL-XL) 100 MG 24 hr tablet, Take 2 tablets by mouth Daily., Disp: 180 tablet, Rfl: 1    oxyCODONE (Roxicodone) 5 MG immediate release tablet, Take 1 tablet by mouth Every 4 (Four) Hours As Needed (pain)., Disp: 40 tablet, Rfl: 0    QUEtiapine (SEROquel) 50 MG tablet, Take 1 tablet by mouth Every Night., Disp: 90 tablet, Rfl: 1    sertraline (Zoloft) 100 MG tablet, Take one 50 mg tablet by mouth daily for two weeks, then take one 100 mg tablet by mouth daily for two weeks, after four weeks, increase to one 50 mg tablet by mouth daily along with one 100 mg tablet by mouth daily to equal 150 mg by mouth daily, Disp: 120 tablet, Rfl: 0    sertraline (Zoloft) 50 MG tablet, Take one 50 mg tablet by mouth daily for two weeks, then take one 100 mg tablet by mouth daily for two weeks, after four weeks, increase to one 50 mg tablet by mouth daily along with one 100 mg tablet by mouth daily to equal 150 mg by mouth daily, Disp: 120 tablet, Rfl: 0    solifenacin (VESICARE) 10 MG tablet, Take 1 tablet by mouth Daily., Disp: , Rfl:     Allergies:   Allergies   Allergen Reactions    Diclofenac GI Bleeding and Unknown (See Comments)    Tofranil [Imipramine Hcl] Other (See Comments)     Syncope, falls    Lisinopril  "Cough       Family History:   Family History   Problem Relation Age of Onset    Cancer Mother     Hypertension Mother     Dementia Mother     Heart disease Father     Heart attack Father     Breast cancer Sister     Brain cancer Sister     Lung cancer Sister     Dementia Maternal Grandmother        Social History:   Social History     Socioeconomic History    Marital status:     Number of children: 1   Tobacco Use    Smoking status: Former     Current packs/day: 0.00     Average packs/day: 1 pack/day for 30.0 years (30.0 ttl pk-yrs)     Types: Cigarettes     Start date: 2000     Quit date: 2023     Years since quittin.8     Passive exposure: Past    Smokeless tobacco: Never    Tobacco comments:     HAS SMOKED 1PPD IN THE PAST   Vaping Use    Vaping status: Never Used   Substance and Sexual Activity    Alcohol use: Not Currently     Alcohol/week: 30.0 - 35.0 standard drinks of alcohol     Types: 30 - 35 Glasses of wine per week     Comment: admits to 6 daily    Drug use: No    Sexual activity: Defer         Objective     Vital Signs  /70   Pulse 57   Resp 16   Ht 177.8 cm (70\")   Wt 69.4 kg (153 lb)   SpO2 97%   BMI 21.95 kg/m²   Estimated body mass index is 21.95 kg/m² as calculated from the following:    Height as of this encounter: 177.8 cm (70\").    Weight as of this encounter: 69.4 kg (153 lb).    BMI is within normal parameters. No other follow-up for BMI required.      Physical Exam  Vitals and nursing note reviewed.   Constitutional:       Appearance: Normal appearance.   HENT:      Head: Normocephalic and atraumatic.      Nose: Nose normal.      Mouth/Throat:      Mouth: Mucous membranes are moist.   Eyes:      Extraocular Movements: Extraocular movements intact.      Conjunctiva/sclera: Conjunctivae normal.      Comments: Left eyelid droop- can hold it open but has to focus    Cardiovascular:      Rate and Rhythm: Normal rate and regular rhythm.      Heart sounds: Normal " heart sounds.   Pulmonary:      Effort: Pulmonary effort is normal. No respiratory distress.      Breath sounds: Normal breath sounds.   Musculoskeletal:         General: Tenderness and signs of injury present.      Comments: Left ankle - in casting wheelchair non weight bearing    Skin:     General: Skin is warm and dry.   Neurological:      Mental Status: He is alert.          Procedures     Assessment and Plan   Diagnosis Discussed   Continue to monitor   Plenty of fluids- electrolyte replacement as discussed- gatorade/liquid IV   Continue to work on alcohol cessation/reduction   Labs today- will notify of results- hyponatremia   Follow up with Neurology- eyelid drooping, brief blackouts   CT head without contrast ordered for further evaluation- discuss with Neurology   Follow up Vascular   Follow up with Orthopedics as scheduled s/p surgery left ankle   If symptoms worsen or persist please seek further evaluation     1. S/P ORIF (open reduction internal fixation) fracture, left ankle, ORIF Synesmosis  Stable. Follow up with Dr. Trejo as scheduled   Continue current restrictions     2. Hyponatremia  - Osmolality, Serum; Future  - Sodium, Urine, Random - Urine, Clean Catch; Future  - Osmolality, Urine - Urine, Clean Catch; Future  - Comprehensive metabolic panel; Future  - CBC (No Diff); Future  Further work up- may need endocrinology referral     3. Drooping eyelid, left  - CT Head Without Contrast; Future  - Comprehensive metabolic panel; Future  Follow up with Neurology as discussed   Recently worsening over last few months     4. Low hemoglobin  - Comprehensive metabolic panel; Future  - CBC (No Diff); Future   Post/op- iron studies in 4/2025- stable   Recheck today.     Follow Up  Return if symptoms worsen or fail to improve, for Next scheduled follow up.    Mali Galeana MD  [facility] INTERNAL MEDICINE  [address] [place] [id number] [phone]

## 2025-05-21 NOTE — PATIENT INSTRUCTIONS
Diagnosis Discussed   Continue to monitor   Plenty of fluids- electrolyte replacement as discussed- gatorade/liquid IV   Continue to work on alcohol cessation/reduction   Labs today- will notify of results- hyponatremia   Follow up with Neurology- eyelid drooping, brief blackouts   CT head without contrast ordered for further evaluation- discuss with Neurology   Follow up Vascular   Follow up with Orthopedics as scheduled s/p surgery left ankle   If symptoms worsen or persist please seek further evaluation

## 2025-05-22 DIAGNOSIS — F10.982 ALCOHOL INDUCED INSOMNIA: ICD-10-CM

## 2025-05-22 DIAGNOSIS — F41.1 GENERALIZED ANXIETY DISORDER: ICD-10-CM

## 2025-05-22 LAB
ALBUMIN SERPL-MCNC: 4.5 G/DL (ref 3.5–5.2)
ALBUMIN/GLOB SERPL: 2 G/DL
ALP SERPL-CCNC: 68 U/L (ref 39–117)
ALT SERPL W P-5'-P-CCNC: 30 U/L (ref 1–41)
ANION GAP SERPL CALCULATED.3IONS-SCNC: 13.6 MMOL/L (ref 5–15)
AST SERPL-CCNC: 30 U/L (ref 1–40)
BILIRUB SERPL-MCNC: 0.5 MG/DL (ref 0–1.2)
BUN SERPL-MCNC: 6 MG/DL (ref 8–23)
BUN/CREAT SERPL: 7 (ref 7–25)
CALCIUM SPEC-SCNC: 9.2 MG/DL (ref 8.6–10.5)
CHLORIDE SERPL-SCNC: 88 MMOL/L (ref 98–107)
CO2 SERPL-SCNC: 24.4 MMOL/L (ref 22–29)
CREAT SERPL-MCNC: 0.86 MG/DL (ref 0.76–1.27)
EGFRCR SERPLBLD CKD-EPI 2021: 88.6 ML/MIN/1.73
GLOBULIN UR ELPH-MCNC: 2.2 GM/DL
GLUCOSE SERPL-MCNC: 116 MG/DL (ref 65–99)
OSMOLALITY UR: 508 MOSM/KG
POTASSIUM SERPL-SCNC: 4.1 MMOL/L (ref 3.5–5.2)
PROT SERPL-MCNC: 6.7 G/DL (ref 6–8.5)
SODIUM SERPL-SCNC: 126 MMOL/L (ref 136–145)
SODIUM UR-SCNC: <20 MMOL/L

## 2025-05-22 RX ORDER — QUETIAPINE FUMARATE 50 MG/1
50 TABLET, FILM COATED ORAL NIGHTLY
Qty: 90 TABLET | Refills: 1 | OUTPATIENT
Start: 2025-05-22

## 2025-05-23 ENCOUNTER — OFFICE VISIT (OUTPATIENT)
Dept: ORTHOPEDIC SURGERY | Facility: CLINIC | Age: 80
End: 2025-05-23
Payer: MEDICARE

## 2025-05-23 VITALS — TEMPERATURE: 97.1 F

## 2025-05-23 DIAGNOSIS — Z09 SURGERY FOLLOW-UP: Primary | ICD-10-CM

## 2025-05-23 NOTE — PATIENT INSTRUCTIONS
"Knee Scooters:      [employer]  -[address], [address], [place] [address]  -[address]  -Phone: [phone]  ~$75.00/month rental    [employer] - type in \"knee scooter\"  -Can purchase online for $100-150           "

## 2025-05-23 NOTE — PROGRESS NOTES
[facility] Follow Up     Office Follow Up Visit     Date: 05/23/2025   Patient Name: Ernesto Vann  MRN: [id number]  YOB: 1945  Chief Complaint:   Chief Complaint   Patient presents with    Post-op     2 week S/P Open Reduction Internal Fixation Of Left Ankle, Syndesmotic Fixation (DOS 5/9/25)     History of Present Illness:   Ernesto Vann is a 79 y.o. male who is here today for 2-week follow-up status post ankle ORIF.  Has been nonweightbearing in short leg cast since surgery.  Has resumed his home dose of Plavix.  Pain and swelling continue to improve.  No new complaints.    Subjective   I reviewed the patient's chief complaint, history of present illness, review of systems, past medical history, surgical history, family history, social history, medications and allergy list   Objective    Vital Signs:   Vitals:    05/23/25 0916   Temp: 97.1 °F (36.2 °C)     There is no height or weight on file to calculate BMI.    Ortho Exam:  left LE Foot and Ankle Exam:   Splint taken down for exam.  Leg compartments soft and compressible.  Incisions clean dry and intact medially and laterally, sutures in place, no drainage or erythema.  Able to actively plantarflex and dorsiflex ankle and all toes.  Appropriate swelling for this stage of healing about the foot and ankle.  Toes warm and well-perfused.  Brisk cap refill in all toes.     Results Review:  XR Ankle 3+ View Left  Left Ankle X-Ray 05/23/25   Indication: Pain  Views: 3 simulated weight bearing , comparison to previous  Findings: xrays reviewed by me today in the office and show, hardware in   place with proper alignment, no signs of hardware failure, ankle mortise   well-maintained        Assessment / Plan    Assessment/Plan:   Diagnoses and all orders for this visit:    1. Surgery follow-up (Primary)  -     XR Ankle 3+ View Left      Patient is now 2 weeks postop, doing well. Happy with result,  no new complaints. Placed in nonweightbearing short leg fiberglass cast in clinic today. Sutures removed in clinic today.  Steri-Strips placed.  Continue to elevate during recovery.  Patient is to be nonweightbearing with use of assistive devices. Continue home dose of Plavix. Plan to see patient back in clinic in 4 weeks for repeat x-rays and reevaluation.  Patient counseled to contact the clinic if anything should arise in the meantime.     Follow Up:   Return in about 4 weeks (around 6/20/2025) for F/u Recheck with images.      Oliver Trejo MD  [facility] Orthopedic Surgeon

## 2025-05-27 DIAGNOSIS — F41.1 GENERALIZED ANXIETY DISORDER: ICD-10-CM

## 2025-05-27 DIAGNOSIS — F10.982 ALCOHOL INDUCED INSOMNIA: ICD-10-CM

## 2025-05-27 RX ORDER — QUETIAPINE FUMARATE 50 MG/1
50 TABLET, FILM COATED ORAL NIGHTLY
Qty: 90 TABLET | Refills: 1 | Status: CANCELLED | OUTPATIENT
Start: 2025-05-27

## 2025-06-20 ENCOUNTER — HOSPITAL ENCOUNTER (OUTPATIENT)
Facility: HOSPITAL | Age: 80
Discharge: NURSING, REHAB OR LONG-TERM CARE | End: 2025-06-23
Attending: ORTHOPAEDIC SURGERY | Admitting: ORTHOPAEDIC SURGERY
Payer: MEDICARE

## 2025-06-20 ENCOUNTER — ANESTHESIA EVENT (OUTPATIENT)
Dept: PERIOP | Facility: HOSPITAL | Age: 80
End: 2025-06-20
Payer: MEDICARE

## 2025-06-20 ENCOUNTER — TELEPHONE (OUTPATIENT)
Dept: INTERNAL MEDICINE | Facility: CLINIC | Age: 80
End: 2025-06-20

## 2025-06-20 ENCOUNTER — APPOINTMENT (OUTPATIENT)
Dept: GENERAL RADIOLOGY | Facility: HOSPITAL | Age: 80
End: 2025-06-20
Payer: MEDICARE

## 2025-06-20 ENCOUNTER — OFFICE VISIT (OUTPATIENT)
Dept: ORTHOPEDIC SURGERY | Facility: CLINIC | Age: 80
End: 2025-06-20
Payer: MEDICARE

## 2025-06-20 ENCOUNTER — ANESTHESIA EVENT CONVERTED (OUTPATIENT)
Dept: ANESTHESIOLOGY | Facility: HOSPITAL | Age: 80
End: 2025-06-20
Payer: MEDICARE

## 2025-06-20 ENCOUNTER — ANESTHESIA (OUTPATIENT)
Dept: PERIOP | Facility: HOSPITAL | Age: 80
End: 2025-06-20
Payer: MEDICARE

## 2025-06-20 DIAGNOSIS — M86.9: ICD-10-CM

## 2025-06-20 DIAGNOSIS — F41.1 GENERALIZED ANXIETY DISORDER: ICD-10-CM

## 2025-06-20 DIAGNOSIS — Z09 SURGERY FOLLOW-UP: Primary | ICD-10-CM

## 2025-06-20 DIAGNOSIS — F33.1 MAJOR DEPRESSIVE DISORDER, RECURRENT EPISODE, MODERATE: ICD-10-CM

## 2025-06-20 PROBLEM — T81.49XA POSTOPERATIVE WOUND INFECTION: Status: ACTIVE | Noted: 2025-06-20

## 2025-06-20 PROBLEM — T81.49XA SURGICAL SITE INFECTION: Status: ACTIVE | Noted: 2025-06-20

## 2025-06-20 LAB
ANION GAP SERPL CALCULATED.3IONS-SCNC: 13 MMOL/L (ref 5–15)
BASE EXCESS BLDA CALC-SCNC: -1 MMOL/L (ref -5–5)
BUN SERPL-MCNC: 6.6 MG/DL (ref 8–23)
BUN/CREAT SERPL: 12.2 (ref 7–25)
CA-I BLDA-SCNC: 1.14 MMOL/L (ref 1.2–1.32)
CALCIUM SPEC-SCNC: 8.8 MG/DL (ref 8.6–10.5)
CHLORIDE SERPL-SCNC: 89 MMOL/L (ref 98–107)
CO2 BLDA-SCNC: 26 MMOL/L (ref 24–29)
CO2 SERPL-SCNC: 23 MMOL/L (ref 22–29)
CREAT SERPL-MCNC: 0.54 MG/DL (ref 0.76–1.27)
CRP SERPL-MCNC: 4.12 MG/DL (ref 0–0.5)
EGFRCR SERPLBLD CKD-EPI 2021: 102 ML/MIN/1.73
GLUCOSE BLDC GLUCOMTR-MCNC: 120 MG/DL (ref 70–130)
GLUCOSE BLDC GLUCOMTR-MCNC: 122 MG/DL (ref 70–130)
GLUCOSE BLDC GLUCOMTR-MCNC: 128 MG/DL (ref 70–130)
GLUCOSE SERPL-MCNC: 125 MG/DL (ref 65–99)
HCO3 BLDA-SCNC: 24.5 MMOL/L (ref 22–26)
HCT VFR BLDA CALC: 41 % (ref 38–51)
HGB BLDA-MCNC: 13.9 G/DL (ref 12–17)
PCO2 BLDA: 41.3 MM HG (ref 35–45)
PH BLDA: 7.38 PH UNITS (ref 7.35–7.6)
PO2 BLDA: 33 MMHG (ref 80–105)
POTASSIUM BLDA-SCNC: 4 MMOL/L (ref 3.5–4.9)
POTASSIUM SERPL-SCNC: 3.9 MMOL/L (ref 3.5–5.2)
SAO2 % BLDA: 63 % (ref 95–98)
SODIUM BLD-SCNC: 123 MMOL/L (ref 138–146)
SODIUM SERPL-SCNC: 125 MMOL/L (ref 136–145)

## 2025-06-20 PROCEDURE — 20680 REMOVAL OF IMPLANT DEEP: CPT | Performed by: ORTHOPAEDIC SURGERY

## 2025-06-20 PROCEDURE — 80048 BASIC METABOLIC PNL TOTAL CA: CPT | Performed by: ORTHOPAEDIC SURGERY

## 2025-06-20 PROCEDURE — 76000 FLUOROSCOPY <1 HR PHYS/QHP: CPT

## 2025-06-20 PROCEDURE — 82803 BLOOD GASES ANY COMBINATION: CPT

## 2025-06-20 PROCEDURE — C1713 ANCHOR/SCREW BN/BN,TIS/BN: HCPCS | Performed by: ORTHOPAEDIC SURGERY

## 2025-06-20 PROCEDURE — 84132 ASSAY OF SERUM POTASSIUM: CPT

## 2025-06-20 PROCEDURE — 63710000001 AMLODIPINE 10 MG TABLET: Performed by: ORTHOPAEDIC SURGERY

## 2025-06-20 PROCEDURE — 87102 FUNGUS ISOLATION CULTURE: CPT | Performed by: ORTHOPAEDIC SURGERY

## 2025-06-20 PROCEDURE — 25810000003 LACTATED RINGERS PER 1000 ML: Performed by: NURSE ANESTHETIST, CERTIFIED REGISTERED

## 2025-06-20 PROCEDURE — A9270 NON-COVERED ITEM OR SERVICE: HCPCS | Performed by: ORTHOPAEDIC SURGERY

## 2025-06-20 PROCEDURE — 85014 HEMATOCRIT: CPT

## 2025-06-20 PROCEDURE — 20702 MNL PREP&INSJ IMED RX DEV: CPT | Performed by: ORTHOPAEDIC SURGERY

## 2025-06-20 PROCEDURE — 82947 ASSAY GLUCOSE BLOOD QUANT: CPT

## 2025-06-20 PROCEDURE — 63710000001 GABAPENTIN 100 MG CAPSULE: Performed by: ORTHOPAEDIC SURGERY

## 2025-06-20 PROCEDURE — 25810000003 LACTATED RINGERS PER 1000 ML: Performed by: ANESTHESIOLOGY

## 2025-06-20 PROCEDURE — 86140 C-REACTIVE PROTEIN: CPT | Performed by: ORTHOPAEDIC SURGERY

## 2025-06-20 PROCEDURE — 25010000002 FAMOTIDINE 10 MG/ML SOLUTION: Performed by: ANESTHESIOLOGY

## 2025-06-20 PROCEDURE — 87075 CULTR BACTERIA EXCEPT BLOOD: CPT | Performed by: ORTHOPAEDIC SURGERY

## 2025-06-20 PROCEDURE — 25010000002 PROPOFOL 10 MG/ML EMULSION: Performed by: NURSE ANESTHETIST, CERTIFIED REGISTERED

## 2025-06-20 PROCEDURE — G0378 HOSPITAL OBSERVATION PER HR: HCPCS

## 2025-06-20 PROCEDURE — 87176 TISSUE HOMOGENIZATION CULTR: CPT | Performed by: ORTHOPAEDIC SURGERY

## 2025-06-20 PROCEDURE — 25810000003 SODIUM CHLORIDE 0.9 % SOLUTION: Performed by: ORTHOPAEDIC SURGERY

## 2025-06-20 PROCEDURE — 87205 SMEAR GRAM STAIN: CPT | Performed by: ORTHOPAEDIC SURGERY

## 2025-06-20 PROCEDURE — 25010000002 DEXAMETHASONE PER 1 MG: Performed by: NURSE ANESTHETIST, CERTIFIED REGISTERED

## 2025-06-20 PROCEDURE — 25010000002 CEFAZOLIN PER 500 MG

## 2025-06-20 PROCEDURE — 87015 SPECIMEN INFECT AGNT CONCNTJ: CPT | Performed by: ORTHOPAEDIC SURGERY

## 2025-06-20 PROCEDURE — 84295 ASSAY OF SERUM SODIUM: CPT

## 2025-06-20 PROCEDURE — 25010000002 LIDOCAINE PF 1% 1 % SOLUTION: Performed by: NURSE ANESTHETIST, CERTIFIED REGISTERED

## 2025-06-20 PROCEDURE — 82330 ASSAY OF CALCIUM: CPT

## 2025-06-20 PROCEDURE — 87070 CULTURE OTHR SPECIMN AEROBIC: CPT | Performed by: ORTHOPAEDIC SURGERY

## 2025-06-20 PROCEDURE — 87116 MYCOBACTERIA CULTURE: CPT | Performed by: ORTHOPAEDIC SURGERY

## 2025-06-20 PROCEDURE — 63710000001 QUETIAPINE 25 MG TABLET: Performed by: ORTHOPAEDIC SURGERY

## 2025-06-20 PROCEDURE — 87206 SMEAR FLUORESCENT/ACID STAI: CPT | Performed by: ORTHOPAEDIC SURGERY

## 2025-06-20 PROCEDURE — 87076 CULTURE ANAEROBE IDENT EACH: CPT | Performed by: ORTHOPAEDIC SURGERY

## 2025-06-20 PROCEDURE — 25010000002 ONDANSETRON PER 1 MG: Performed by: NURSE ANESTHETIST, CERTIFIED REGISTERED

## 2025-06-20 PROCEDURE — 25010000002 BUPIVACAINE (PF) 0.25 % SOLUTION: Performed by: ANESTHESIOLOGY

## 2025-06-20 PROCEDURE — 25010000002 CEFAZOLIN PER 500 MG: Performed by: ORTHOPAEDIC SURGERY

## 2025-06-20 PROCEDURE — 63710000001 ATORVASTATIN 20 MG TABLET: Performed by: ORTHOPAEDIC SURGERY

## 2025-06-20 DEVICE — IMPLANTABLE DEVICE
Type: IMPLANTABLE DEVICE | Site: ANKLE | Status: FUNCTIONAL
Brand: CERAMENT™BONE VOID FILLER

## 2025-06-20 DEVICE — STIMULAN® RAPID CURE PROVIDED STERILE FOR SINGLE PATIENT USE. STIMULAN® RAPID CURE CONTAINS CALCIUM SULFATE POWDER AND MIXING SOLUTION IN PRE-MEASURED QUANTITIES SO THAT WHEN MIXED TOGETHER IN A STERILE MIXING BOWL, THE RESULTANT PASTE IS TO BE DIGITALLY PACKED INTO OPEN BONE VOID/GAP TO SET INSITU OR PLACED INTO THE MOULD PROVIDED, THE MIXTURE SETS TO FORM BEADS. THE BIODEGRADABLE, RADIOPAQUE BEADS ARE RESORBED IN APPROXIMATELY 30 – 60 DAYS WHEN USED IN ACCORDANCE WITH THE DEVICE LABELLING. STIMULAN® RAPID CURE IS MANUFACTURED FROM SYNTHETIC IMPLANT GRADE CALCIUM SULFATE DIHYDRATE(CASO4.2H2O) THAT RESORBS AND IS REPLACED WITH BONE DURING THE HEALING PROCESS. ALSO, AS THE BONE VOID FILLER BEADS ARE BIODEGRADABLE AND BIOCOMPATIBLE, THEY MAY BE USED AT AN INFECTED SITE.
Type: IMPLANTABLE DEVICE | Site: ANKLE | Status: FUNCTIONAL
Brand: STIMULAN® RAPID CURE

## 2025-06-20 RX ORDER — BISACODYL 10 MG
10 SUPPOSITORY, RECTAL RECTAL DAILY PRN
Status: DISCONTINUED | OUTPATIENT
Start: 2025-06-20 | End: 2025-06-23 | Stop reason: HOSPADM

## 2025-06-20 RX ORDER — AMLODIPINE BESYLATE 10 MG/1
10 TABLET ORAL DAILY
Status: DISCONTINUED | OUTPATIENT
Start: 2025-06-20 | End: 2025-06-23 | Stop reason: HOSPADM

## 2025-06-20 RX ORDER — IPRATROPIUM BROMIDE AND ALBUTEROL SULFATE 2.5; .5 MG/3ML; MG/3ML
3 SOLUTION RESPIRATORY (INHALATION) ONCE AS NEEDED
Status: DISCONTINUED | OUTPATIENT
Start: 2025-06-20 | End: 2025-06-20 | Stop reason: HOSPADM

## 2025-06-20 RX ORDER — BUPIVACAINE HCL/0.9 % NACL/PF 0.125 %
PLASTIC BAG, INJECTION (ML) EPIDURAL AS NEEDED
Status: DISCONTINUED | OUTPATIENT
Start: 2025-06-20 | End: 2025-06-20 | Stop reason: SURG

## 2025-06-20 RX ORDER — LIDOCAINE HYDROCHLORIDE 10 MG/ML
INJECTION, SOLUTION EPIDURAL; INFILTRATION; INTRACAUDAL; PERINEURAL AS NEEDED
Status: DISCONTINUED | OUTPATIENT
Start: 2025-06-20 | End: 2025-06-20 | Stop reason: SURG

## 2025-06-20 RX ORDER — ACETAMINOPHEN 325 MG/1
650 TABLET ORAL EVERY 4 HOURS PRN
Status: DISCONTINUED | OUTPATIENT
Start: 2025-06-20 | End: 2025-06-23 | Stop reason: HOSPADM

## 2025-06-20 RX ORDER — FAMOTIDINE 20 MG/1
20 TABLET, FILM COATED ORAL ONCE
Status: COMPLETED | OUTPATIENT
Start: 2025-06-20 | End: 2025-06-20

## 2025-06-20 RX ORDER — HYDROCODONE BITARTRATE AND ACETAMINOPHEN 5; 325 MG/1; MG/1
1 TABLET ORAL ONCE AS NEEDED
Status: DISCONTINUED | OUTPATIENT
Start: 2025-06-20 | End: 2025-06-20 | Stop reason: HOSPADM

## 2025-06-20 RX ORDER — HYDROMORPHONE HYDROCHLORIDE 1 MG/ML
0.5 INJECTION, SOLUTION INTRAMUSCULAR; INTRAVENOUS; SUBCUTANEOUS
Status: DISCONTINUED | OUTPATIENT
Start: 2025-06-20 | End: 2025-06-23 | Stop reason: HOSPADM

## 2025-06-20 RX ORDER — HYDROCODONE BITARTRATE AND ACETAMINOPHEN 7.5; 325 MG/1; MG/1
1 TABLET ORAL EVERY 4 HOURS PRN
Status: DISCONTINUED | OUTPATIENT
Start: 2025-06-20 | End: 2025-06-20 | Stop reason: HOSPADM

## 2025-06-20 RX ORDER — LABETALOL HYDROCHLORIDE 5 MG/ML
10 INJECTION, SOLUTION INTRAVENOUS EVERY 4 HOURS PRN
Status: DISCONTINUED | OUTPATIENT
Start: 2025-06-20 | End: 2025-06-23 | Stop reason: HOSPADM

## 2025-06-20 RX ORDER — QUETIAPINE FUMARATE 25 MG/1
50 TABLET, FILM COATED ORAL NIGHTLY
Status: DISCONTINUED | OUTPATIENT
Start: 2025-06-20 | End: 2025-06-23 | Stop reason: HOSPADM

## 2025-06-20 RX ORDER — NALOXONE HCL 0.4 MG/ML
0.1 VIAL (ML) INJECTION
Status: DISCONTINUED | OUTPATIENT
Start: 2025-06-20 | End: 2025-06-23 | Stop reason: HOSPADM

## 2025-06-20 RX ORDER — OXYBUTYNIN CHLORIDE 10 MG/1
10 TABLET, EXTENDED RELEASE ORAL DAILY
Status: DISCONTINUED | OUTPATIENT
Start: 2025-06-21 | End: 2025-06-23 | Stop reason: HOSPADM

## 2025-06-20 RX ORDER — LABETALOL HYDROCHLORIDE 5 MG/ML
5 INJECTION, SOLUTION INTRAVENOUS
Status: DISCONTINUED | OUTPATIENT
Start: 2025-06-20 | End: 2025-06-20 | Stop reason: HOSPADM

## 2025-06-20 RX ORDER — FAMOTIDINE 10 MG/ML
20 INJECTION, SOLUTION INTRAVENOUS ONCE
Status: COMPLETED | OUTPATIENT
Start: 2025-06-20 | End: 2025-06-20

## 2025-06-20 RX ORDER — EPHEDRINE SULFATE 50 MG/ML
INJECTION INTRAVENOUS AS NEEDED
Status: DISCONTINUED | OUTPATIENT
Start: 2025-06-20 | End: 2025-06-20 | Stop reason: SURG

## 2025-06-20 RX ORDER — PROMETHAZINE HYDROCHLORIDE 25 MG/1
25 SUPPOSITORY RECTAL ONCE AS NEEDED
Status: DISCONTINUED | OUTPATIENT
Start: 2025-06-20 | End: 2025-06-20 | Stop reason: HOSPADM

## 2025-06-20 RX ORDER — HYDRALAZINE HYDROCHLORIDE 20 MG/ML
5 INJECTION INTRAMUSCULAR; INTRAVENOUS
Status: DISCONTINUED | OUTPATIENT
Start: 2025-06-20 | End: 2025-06-20 | Stop reason: HOSPADM

## 2025-06-20 RX ORDER — AMOXICILLIN 250 MG
2 CAPSULE ORAL 2 TIMES DAILY PRN
Status: DISCONTINUED | OUTPATIENT
Start: 2025-06-20 | End: 2025-06-22

## 2025-06-20 RX ORDER — SODIUM CHLORIDE 0.9 % (FLUSH) 0.9 %
3-10 SYRINGE (ML) INJECTION AS NEEDED
Status: DISCONTINUED | OUTPATIENT
Start: 2025-06-20 | End: 2025-06-20 | Stop reason: HOSPADM

## 2025-06-20 RX ORDER — POLYETHYLENE GLYCOL 3350 17 G/17G
17 POWDER, FOR SOLUTION ORAL AS NEEDED
Status: DISCONTINUED | OUTPATIENT
Start: 2025-06-20 | End: 2025-06-22

## 2025-06-20 RX ORDER — SODIUM CHLORIDE 0.9 % (FLUSH) 0.9 %
10 SYRINGE (ML) INJECTION AS NEEDED
Status: DISCONTINUED | OUTPATIENT
Start: 2025-06-20 | End: 2025-06-23 | Stop reason: HOSPADM

## 2025-06-20 RX ORDER — DROPERIDOL 2.5 MG/ML
0.62 INJECTION, SOLUTION INTRAMUSCULAR; INTRAVENOUS ONCE AS NEEDED
Status: DISCONTINUED | OUTPATIENT
Start: 2025-06-20 | End: 2025-06-20 | Stop reason: HOSPADM

## 2025-06-20 RX ORDER — DEXMEDETOMIDINE HYDROCHLORIDE 100 UG/ML
INJECTION, SOLUTION INTRAVENOUS AS NEEDED
Status: DISCONTINUED | OUTPATIENT
Start: 2025-06-20 | End: 2025-06-20 | Stop reason: SURG

## 2025-06-20 RX ORDER — DEXAMETHASONE SODIUM PHOSPHATE 4 MG/ML
INJECTION, SOLUTION INTRA-ARTICULAR; INTRALESIONAL; INTRAMUSCULAR; INTRAVENOUS; SOFT TISSUE AS NEEDED
Status: DISCONTINUED | OUTPATIENT
Start: 2025-06-20 | End: 2025-06-20 | Stop reason: SURG

## 2025-06-20 RX ORDER — OXYCODONE HYDROCHLORIDE 5 MG/1
5 TABLET ORAL EVERY 4 HOURS PRN
Status: DISCONTINUED | OUTPATIENT
Start: 2025-06-20 | End: 2025-06-23 | Stop reason: HOSPADM

## 2025-06-20 RX ORDER — SODIUM CHLORIDE, SODIUM LACTATE, POTASSIUM CHLORIDE, CALCIUM CHLORIDE 600; 310; 30; 20 MG/100ML; MG/100ML; MG/100ML; MG/100ML
9 INJECTION, SOLUTION INTRAVENOUS CONTINUOUS
Status: ACTIVE | OUTPATIENT
Start: 2025-06-20 | End: 2025-06-21

## 2025-06-20 RX ORDER — SODIUM CHLORIDE 0.9 % (FLUSH) 0.9 %
10 SYRINGE (ML) INJECTION EVERY 12 HOURS SCHEDULED
Status: DISCONTINUED | OUTPATIENT
Start: 2025-06-20 | End: 2025-06-23 | Stop reason: HOSPADM

## 2025-06-20 RX ORDER — HYDROMORPHONE HYDROCHLORIDE 1 MG/ML
0.5 INJECTION, SOLUTION INTRAMUSCULAR; INTRAVENOUS; SUBCUTANEOUS
Status: DISCONTINUED | OUTPATIENT
Start: 2025-06-20 | End: 2025-06-20 | Stop reason: HOSPADM

## 2025-06-20 RX ORDER — PROPOFOL 10 MG/ML
VIAL (ML) INTRAVENOUS AS NEEDED
Status: DISCONTINUED | OUTPATIENT
Start: 2025-06-20 | End: 2025-06-20 | Stop reason: SURG

## 2025-06-20 RX ORDER — GABAPENTIN 100 MG/1
1 CAPSULE ORAL EVERY 12 HOURS SCHEDULED
COMMUNITY
Start: 2025-06-10

## 2025-06-20 RX ORDER — PROMETHAZINE HYDROCHLORIDE 25 MG/1
25 TABLET ORAL ONCE AS NEEDED
Status: DISCONTINUED | OUTPATIENT
Start: 2025-06-20 | End: 2025-06-20 | Stop reason: HOSPADM

## 2025-06-20 RX ORDER — SODIUM CHLORIDE, SODIUM LACTATE, POTASSIUM CHLORIDE, CALCIUM CHLORIDE 600; 310; 30; 20 MG/100ML; MG/100ML; MG/100ML; MG/100ML
INJECTION, SOLUTION INTRAVENOUS CONTINUOUS PRN
Status: DISCONTINUED | OUTPATIENT
Start: 2025-06-20 | End: 2025-06-20 | Stop reason: SURG

## 2025-06-20 RX ORDER — CEFAZOLIN SODIUM 1 G/3ML
INJECTION, POWDER, FOR SOLUTION INTRAMUSCULAR; INTRAVENOUS AS NEEDED
Status: DISCONTINUED | OUTPATIENT
Start: 2025-06-20 | End: 2025-06-20 | Stop reason: SURG

## 2025-06-20 RX ORDER — SERTRALINE HYDROCHLORIDE 100 MG/1
100 TABLET, FILM COATED ORAL DAILY
Status: DISCONTINUED | OUTPATIENT
Start: 2025-06-21 | End: 2025-06-23 | Stop reason: HOSPADM

## 2025-06-20 RX ORDER — SODIUM CHLORIDE, SODIUM LACTATE, POTASSIUM CHLORIDE, CALCIUM CHLORIDE 600; 310; 30; 20 MG/100ML; MG/100ML; MG/100ML; MG/100ML
9 INJECTION, SOLUTION INTRAVENOUS ONCE
Status: COMPLETED | OUTPATIENT
Start: 2025-06-20 | End: 2025-06-20

## 2025-06-20 RX ORDER — CLOPIDOGREL BISULFATE 75 MG/1
75 TABLET ORAL DAILY
Status: DISCONTINUED | OUTPATIENT
Start: 2025-06-21 | End: 2025-06-23 | Stop reason: HOSPADM

## 2025-06-20 RX ORDER — SODIUM CHLORIDE 0.9 % (FLUSH) 0.9 %
3 SYRINGE (ML) INJECTION EVERY 12 HOURS SCHEDULED
Status: DISCONTINUED | OUTPATIENT
Start: 2025-06-20 | End: 2025-06-20 | Stop reason: HOSPADM

## 2025-06-20 RX ORDER — ONDANSETRON 2 MG/ML
INJECTION INTRAMUSCULAR; INTRAVENOUS AS NEEDED
Status: DISCONTINUED | OUTPATIENT
Start: 2025-06-20 | End: 2025-06-20 | Stop reason: SURG

## 2025-06-20 RX ORDER — NALOXONE HCL 0.4 MG/ML
0.4 VIAL (ML) INJECTION AS NEEDED
Status: DISCONTINUED | OUTPATIENT
Start: 2025-06-20 | End: 2025-06-20 | Stop reason: HOSPADM

## 2025-06-20 RX ORDER — ONDANSETRON 2 MG/ML
4 INJECTION INTRAMUSCULAR; INTRAVENOUS ONCE AS NEEDED
Status: DISCONTINUED | OUTPATIENT
Start: 2025-06-20 | End: 2025-06-20 | Stop reason: HOSPADM

## 2025-06-20 RX ORDER — FENTANYL CITRATE 50 UG/ML
50 INJECTION, SOLUTION INTRAMUSCULAR; INTRAVENOUS
Status: DISCONTINUED | OUTPATIENT
Start: 2025-06-20 | End: 2025-06-20 | Stop reason: HOSPADM

## 2025-06-20 RX ORDER — BUPIVACAINE HYDROCHLORIDE 2.5 MG/ML
INJECTION, SOLUTION EPIDURAL; INFILTRATION; INTRACAUDAL; PERINEURAL
Status: COMPLETED | OUTPATIENT
Start: 2025-06-20 | End: 2025-06-20

## 2025-06-20 RX ORDER — ATORVASTATIN CALCIUM 20 MG/1
20 TABLET, FILM COATED ORAL NIGHTLY
Status: DISCONTINUED | OUTPATIENT
Start: 2025-06-20 | End: 2025-06-23 | Stop reason: HOSPADM

## 2025-06-20 RX ORDER — GABAPENTIN 100 MG/1
100 CAPSULE ORAL EVERY 12 HOURS SCHEDULED
Status: DISCONTINUED | OUTPATIENT
Start: 2025-06-20 | End: 2025-06-23 | Stop reason: HOSPADM

## 2025-06-20 RX ORDER — METOPROLOL SUCCINATE 100 MG/1
200 TABLET, EXTENDED RELEASE ORAL DAILY
Status: DISCONTINUED | OUTPATIENT
Start: 2025-06-21 | End: 2025-06-23 | Stop reason: HOSPADM

## 2025-06-20 RX ORDER — SODIUM CHLORIDE 9 MG/ML
40 INJECTION, SOLUTION INTRAVENOUS AS NEEDED
Status: DISCONTINUED | OUTPATIENT
Start: 2025-06-20 | End: 2025-06-23 | Stop reason: HOSPADM

## 2025-06-20 RX ORDER — SODIUM CHLORIDE 9 MG/ML
100 INJECTION, SOLUTION INTRAVENOUS CONTINUOUS
Status: ACTIVE | OUTPATIENT
Start: 2025-06-20 | End: 2025-06-21

## 2025-06-20 RX ORDER — SODIUM CHLORIDE 9 MG/ML
9 INJECTION, SOLUTION INTRAVENOUS AS NEEDED
Status: DISCONTINUED | OUTPATIENT
Start: 2025-06-20 | End: 2025-06-20 | Stop reason: HOSPADM

## 2025-06-20 RX ORDER — ACETAMINOPHEN 650 MG/1
650 SUPPOSITORY RECTAL EVERY 4 HOURS PRN
Status: DISCONTINUED | OUTPATIENT
Start: 2025-06-20 | End: 2025-06-23 | Stop reason: HOSPADM

## 2025-06-20 RX ORDER — DROPERIDOL 2.5 MG/ML
0.62 INJECTION, SOLUTION INTRAMUSCULAR; INTRAVENOUS
Status: DISCONTINUED | OUTPATIENT
Start: 2025-06-20 | End: 2025-06-20 | Stop reason: HOSPADM

## 2025-06-20 RX ADMIN — QUETIAPINE FUMARATE 50 MG: 25 TABLET ORAL at 19:58

## 2025-06-20 RX ADMIN — CEFAZOLIN 2 G: 1 INJECTION, POWDER, FOR SOLUTION INTRAMUSCULAR; INTRAVENOUS at 15:06

## 2025-06-20 RX ADMIN — SODIUM CHLORIDE, SODIUM LACTATE, POTASSIUM CHLORIDE, CALCIUM CHLORIDE 9 ML/HR: 20; 30; 600; 310 INJECTION, SOLUTION INTRAVENOUS at 13:38

## 2025-06-20 RX ADMIN — Medication 10 ML: at 19:58

## 2025-06-20 RX ADMIN — LIDOCAINE HYDROCHLORIDE 50 MG: 10 INJECTION, SOLUTION EPIDURAL; INFILTRATION; INTRACAUDAL; PERINEURAL at 14:18

## 2025-06-20 RX ADMIN — EPHEDRINE SULFATE 10 MG: 50 INJECTION INTRAVENOUS at 14:51

## 2025-06-20 RX ADMIN — DEXMEDETOMIDINE HYDROCHLORIDE 40 MCG: 100 INJECTION, SOLUTION INTRAVENOUS at 14:18

## 2025-06-20 RX ADMIN — ATORVASTATIN CALCIUM 20 MG: 20 TABLET, FILM COATED ORAL at 19:58

## 2025-06-20 RX ADMIN — PROPOFOL 200 MG: 10 INJECTION, EMULSION INTRAVENOUS at 14:18

## 2025-06-20 RX ADMIN — AMLODIPINE BESYLATE 10 MG: 10 TABLET ORAL at 18:25

## 2025-06-20 RX ADMIN — SODIUM CHLORIDE 100 ML/HR: 9 INJECTION, SOLUTION INTRAVENOUS at 18:25

## 2025-06-20 RX ADMIN — GABAPENTIN 100 MG: 100 CAPSULE ORAL at 19:58

## 2025-06-20 RX ADMIN — EPHEDRINE SULFATE 5 MG: 50 INJECTION INTRAVENOUS at 14:23

## 2025-06-20 RX ADMIN — FAMOTIDINE 20 MG: 10 INJECTION, SOLUTION INTRAVENOUS at 13:38

## 2025-06-20 RX ADMIN — ONDANSETRON 4 MG: 2 INJECTION INTRAMUSCULAR; INTRAVENOUS at 14:24

## 2025-06-20 RX ADMIN — SODIUM CHLORIDE, POTASSIUM CHLORIDE, SODIUM LACTATE AND CALCIUM CHLORIDE: 600; 310; 30; 20 INJECTION, SOLUTION INTRAVENOUS at 14:13

## 2025-06-20 RX ADMIN — Medication 100 MCG: at 14:23

## 2025-06-20 RX ADMIN — DEXAMETHASONE SODIUM PHOSPHATE 4 MG: 4 INJECTION, SOLUTION INTRA-ARTICULAR; INTRALESIONAL; INTRAMUSCULAR; INTRAVENOUS; SOFT TISSUE at 14:24

## 2025-06-20 RX ADMIN — SODIUM CHLORIDE, POTASSIUM CHLORIDE, SODIUM LACTATE AND CALCIUM CHLORIDE: 600; 310; 30; 20 INJECTION, SOLUTION INTRAVENOUS at 15:19

## 2025-06-20 RX ADMIN — Medication 100 MCG: at 14:51

## 2025-06-20 RX ADMIN — SODIUM CHLORIDE 2 G: 900 INJECTION INTRAVENOUS at 23:19

## 2025-06-20 RX ADMIN — BUPIVACAINE HYDROCHLORIDE 30 ML: 2.5 INJECTION, SOLUTION EPIDURAL; INFILTRATION; INTRACAUDAL; PERINEURAL at 14:09

## 2025-06-20 NOTE — PLAN OF CARE
Goal Outcome Evaluation:   Alert and oriented.On RA.Iv fluids on flow.NWB on LLE with Boot.Voiding spontaneously.ID to be consulted .

## 2025-06-20 NOTE — ANESTHESIA PREPROCEDURE EVALUATION
Anesthesia Evaluation     Patient summary reviewed and Nursing notes reviewed                Airway   Mallampati: II  TM distance: >3 FB  Neck ROM: full  No difficulty expected  Dental - normal exam     Pulmonary - normal exam   (+) a smoker (7/23) Former, COPD,  Cardiovascular - normal exam    (+) hypertension, PVD, hyperlipidemia    ROS comment:   Echo 7/21: normal EF, mild TR    Neuro/Psych- negative ROS  GI/Hepatic/Renal/Endo    (+) GERD, diabetes mellitus    Musculoskeletal (-) negative ROS    Abdominal  - normal exam    Bowel sounds: normal.   Substance History   (+) alcohol use     OB/GYN negative ob/gyn ROS         Other        ROS/Med Hx Other: Na+ 126; discussed with Dr. Trejo, pt with exposed hardware and urgent procedure; will proceed              Anesthesia Plan    ASA 4     general     intravenous induction     Anesthetic plan, risks, benefits, and alternatives have been provided, discussed and informed consent has been obtained with: patient.    Plan discussed with CRNA.    CODE STATUS:

## 2025-06-20 NOTE — TELEPHONE ENCOUNTER
Caller: Merary Vann    Relationship: Emergency Contact    Best call back number: [phone]     What is the medical concern/diagnosis: SODIUM LEVEL    What specialty or service is being requested: SODIUM LEVEL BLOOD TEST    What is the provider, practice or medical service name: PCP OFFICE    Any additional details: PLEASE CALL PATIENT BACK WHEN ORDER IS PLACED OR IF REQUEST IS DENIED

## 2025-06-20 NOTE — ANESTHESIA PROCEDURE NOTES
Peripheral Block      Patient reassessed immediately prior to procedure    Patient location during procedure: pre-op  Reason for block: at surgeon's request and post-op pain management  Performed by  CRNA/CAA: Carmelo Goncalves CRNA  Assisted by: Amber Desai RN  Preanesthetic Checklist  Completed: patient identified, IV checked, site marked, risks and benefits discussed, surgical consent, monitors and equipment checked, pre-op evaluation and timeout performed  Prep:  Pt Position: right lateral decubitus  Sterile barriers:cap, gloves, mask and washed/disinfected hands  Prep: ChloraPrep  Patient monitoring: blood pressure monitoring, continuous pulse oximetry and EKG  Procedure    Sedation: yes  Performed under: local infiltration  Guidance:ultrasound guided    ULTRASOUND INTERPRETATION.  Using ultrasound guidance a 20 G gauge needle was placed in close proximity to the nerve, at which point, under ultrasound guidance anesthetic was injected in the area of the nerve and spread of the anesthesia was seen on ultrasound in close proximity thereto.  There were no abnormalities seen on ultrasound; a digital image was taken; and the patient tolerated the procedure with no complications. Images:still images obtained, printed/placed on chart    Laterality:left  Block Type:popliteal  Injection Technique:single-shot  Needle Type:echogenic and short-bevel  Needle Gauge:20 G  Resistance on Injection: none    Medications Used: bupivacaine PF (MARCAINE) 0.25 % injection - Injection   30 mL - 6/20/2025 2:09:00 PM      Medications  Preservative Free Saline:5ml    Post Assessment  Injection Assessment: negative aspiration for heme, no paresthesia on injection and incremental injection  Patient Tolerance:comfortable throughout block  Complications:no  Additional Notes  SINGLE shot    A high-frequency linear transducer, with sterile cover, was placed in the popliteal fossa to identify the popliteal artery and vein, Tibial nerve  "(TN) and Common Peroneal nerve (CP). The transducer was then moved in a cephalad fashion to observe the TN and CP nerve bifurcation to form the Sciatic Nerve. The insertion site was prepped and draped in sterile fashion. Skin and cutaneous tissue was infiltrated with 2-5 ml of 1% Lidocaine. Using ultrasound-guidance, a 20-gauge B-Gibbons 4\" Ultraplex 360 non-stimulating echogenic needle was then inserted and advanced in plane from lateral to medial. Preservative-free normal saline was utilized for hydro-dissection of tissue, advancement of Touhy, and to confirm final needle placement posterior to the nerves. Local anesthetic injection spread, in incremental 3-5 ml injections, to surround both nerve structures. Aspiration every 5 ml to prevent intravascular injection. Injection was completed with negative aspiration of blood and negative intravascular injection. Injection pressures were normal with minimal resistance    Performed by: Robe Alfaro CRNA            "

## 2025-06-20 NOTE — OP NOTE
ORTHOPAEDIC SURGERY OPERATIVE REPORT    Location of Surgery: [facility]    Patient Name:  Ernesto Vann  YOB: 1945    Date of Surgery:  6/20/2025     Pre-op Diagnosis:   Left ankle surgical site infection     Post-op Diagnosis:      Same    Procedure/CPT® Codes:  1.  Ankle deep hardware removal, 20680  2.  Ankle placement of antibiotics impregnated matrix, 20702    Staff:  Surgeon(s):  Oliver Trejo MD       Anesthesia: Choice    Estimated Blood Loss: minimal    Specimen:          None    Complications:   None    CLINICAL HISTORY:  Ernesto Vann is a 79 y.o. male with the above condition. Discussed operative and non-operative treatment options and risks including but not limited to pain, infection, bleeding, damage to surrounding structures, and need for additional procedures.  After all questions were fully answered, Ernesto Vann understood the risks and elected to proceed, and informed consent was obtained. The patient was marked with indelible marking pen after verifying correct side, site, and procedure.      DESCRIPTION OF PROCEDURE:   The patient was identified in the pre-operative area where correct procedure, site, and patient were verified. The patient was brought to the operating theater in stable condition and was transferred to the operative table where they remained in the supine position for the entirety of the case. Preoperative timeout was taken to ensure the correct identity, operative procedure, and location. General anesthesia was then administered. The patient received appropriate weight based IV antibiotics within 30 minutes of skin incision.  All bony prominences well-padded. The left leg was then prepped and draped in the standard sterile fashion. After the leg was elevated, the tourniquet was inflated.    I started by making an incision at the previous incision over the lateral ankle.  I used sharp dissection with scalpel through the skin and  then used blunt dissection down to the bone.  At this point the plate could be visualized.  There was clearly infected tissue.  Soft tissue and bone samples were placed in specimen cups and cultures were taken.  These were then sent onto the laboratory for testing.  After culture samples had been taking, the patient was given IV antibiotics.  I used blunt dissection to uncover the lateral plate.  Next a screwdriver was used to remove the screws and the plate was removed.  The interfragmentary screw was also removed.  At this point I used a curette to debride all of the screw holes and areas of clearly infected bone, area debrided was less than 20 cm² .  I used a rongeur and a curette.  At this point the wound was copiously irrigated with Irrisept followed by 2 L of saline irrigation.  Next I elected to place a hydroxyapatite and calcium sulfate bone filler (cerament) (with gentamicin and Vanco powder added per  specifications).  The mixture was then placed into all of the bone voids and screw holes and allowed to harden.  The tourniquet was released at this time.  Hemostasis was achieved with electrocautery.  The wound was then loosely closed with a combination of 2-0 Monocryl suture deep and 3-0 nylon suture in the skin.  Final x-rays were taken confirming removal of all hardware as well as confirming the placement of cerament which is radiopaque.  A sterile dressing was then placed and then the patient's leg was placed in a tall cam walking boot.    The patient tolerated the procedure well no with acute complications identified.  All sponge & needle counts were correct x2 prior to procedure conclusion.  Patient was awoken from anesthesia and transferred back to the PACU without complication.     Counts:    Sponge: Correct    Needle: Correct    Sharp: Correct    Instrument: Correct     POSTOPERATIVE PLAN:   -NWB operative extremity in boot  -Advance diet as tolerated  -Keep operative dressing clean and  dry  -DVT prophylaxis, mechanical and plavix, hold plavix for now  -Abx  -Consult ID, appreciate recs  -Pain control  -PT/OT  -Follow-up Intra-Op cultures  -Elevate operative extremity  -Consult medicine appreciate recs  -Dispo pending    Implants:    Implant Name Type Inv. Item Serial No.  Lot No. LRB No. Used Action   BONE FILLER VOID RAPD CURE STIMULAN 5CC - [id number] Implant BONE FILLER VOID RAPD CURE STIMULAN 5CC  [employer] [id number] Left 1 Implanted   BONE FILLER VOID CERAMENT 5ML - [id number] Implant BONE FILLER VOID CERAMENT 5ML  BONE SUPPORT [id number] Left 1 Implanted         Oliver Trejo MD     Date: 6/20/2025  Time: 16:37 EDT  Electronically signed by Oliver Trejo MD, 06/20/25, 4:37 PM EDT.

## 2025-06-20 NOTE — ANESTHESIA POSTPROCEDURE EVALUATION
Patient: Ernesto Vann    Procedure Summary       Date: 06/20/25 [facility]    Anesthesia Start: 1413 Anesthesia Stop: 1613    Procedure: LEFT ANKLE INCISION AND DRAINAGE (Left: Ankle) Diagnosis:     Surgeons: Oliver Trejo MD Provider: Oliver Ayala MD    Anesthesia Type: general ASA Status: 4            Anesthesia Type: general    Vitals  Vitals Value Taken Time   /75 06/20/25 16:13   Temp 97.5 °F (36.4 °C) 06/20/25 16:13   Pulse 62 06/20/25 16:13   Resp 16 06/20/25 16:13   SpO2 100 % 06/20/25 16:13           Post Anesthesia Care and Evaluation    Patient location during evaluation: PACU  Patient participation: complete - patient participated  Level of consciousness: awake and alert  Pain management: adequate    Airway patency: patent  Anesthetic complications: No anesthetic complications  PONV Status: none  Cardiovascular status: hemodynamically stable and acceptable  Respiratory status: nonlabored ventilation, acceptable, nasal cannula and spontaneous ventilation  Hydration status: acceptable  No anesthesia care post op

## 2025-06-20 NOTE — H&P (VIEW-ONLY)
[facility] [place]opaedic Surgery Office Follow Up     Office Follow Up Visit     Date: 06/20/2025   Patient Name: Ernesto Vann  MRN: [id number]  YOB: 1945  Chief Complaint:   Chief Complaint   Patient presents with    Follow-up     1 month follow up -- 6 weeks S/P Open Reduction Internal Fixation Of Left Ankle, Syndesmotic Fixation (DOS 5/9/25)     History of Present Illness:   Ernesto Vann is a 79 y.o. male who is here today for follow-up status post left ankle ORIF.  Previous surgery was May 9.  Here today for routine follow-up.  Denies fevers chills or feeling ill.  States did not like wearing the cast.  Denies pain.  No new complaints.    Subjective   I reviewed the patient's chief complaint, history of present illness, review of systems, past medical history, surgical history, family history, social history, medications and allergy list   Objective    Vital Signs: There were no vitals filed for this visit.  There is no height or weight on file to calculate BMI.    Ortho Exam:  left LE Foot and Ankle Exam:   Cast removed for exam.  Leg compartments soft and compressible.  Incisions clean dry and intact medially, sutures in place.  Area of wound dehiscence with exposed hardware left ankle over lateral malleolus some serous drainage.  No visible purulence.  Able to actively plantarflex and dorsiflex ankle and all toes.  Appropriate swelling for this stage of healing about the foot and ankle.  Toes warm and well-perfused.  Brisk cap refill in all toes.     Results Review:  XR Ankle 3+ View Left  Left Ankle X-Ray 06/20/25   Indication: Pain  Views: 3 simulated weight bearing , comparison to previous  Findings: xrays reviewed by me today in the office and show, hardware in   place with proper alignment, no signs of hardware failure, ankle mortise   well-maintained        Assessment / Plan    Assessment/Plan:   Diagnoses and all orders for this visit:    1. Surgery  follow-up (Primary)  -     XR Ankle 3+ View Left      Returns to clinic for follow-up status post ankle ORIF.  Unfortunately has developed an area of wound dehiscence concerning for infection with exposed hardware.  Due to the nature of the exposed hardware I think this is an urgent surgical situation.  I discussed with patient my recommendation of returning to the OR for I&D and possible hardware removal.  Will consider placement of antibiotic impregnated calcium sulfate.  I may also elect to leave the hardware in place pending surgical exploration.  Will likely plan for admittance following I&D for infectious disease as well as possible PT wound care assistance in his management.  The case is scheduled for 2 PM today.    The risks and benefits of the procedure were discussed with the patient, which include but are not limited to the risk of bleeding, recurrent infection, neurovascular damage, post-operative stiffness, continued/chronic pain, need for further revision surgeries in the future, deep venous thrombosis, deformity, malunion, nonunion, hardware failure, need for further surgery, amputation, etc. We also discussed the general risks from anesthesia including death, strokes, heart attacks, blood clots, etc. We also discussed the post-operative rehabilitation, the need for physical therapy, and the overall expected outcomes from the procedure. We also discussed what would happen if we do nothing.  I allowed proper time and answered the patient's questions regarding the procedure. The patient expressed understanding. Knowing what the risks are and what the conservative treatment is, the patient elected to forgo any further conservative treatment options and proceed with the surgical intervention.     Follow Up:   Return in about 1 week for F/U after Surgery.      Oliver Trejo MD  [facility] [place] Orthopedic Surgeon

## 2025-06-20 NOTE — DISCHARGE INSTRUCTIONS
Oliver Trejo MD  Orthopedic Foot & Ankle Surgeon  [facility]    Diagnosis: No chief complaint on file.       Procedure Performed: Procedure(s) (LRB):  LEFT ANKLE INCISION AND DRAINAGE (Left)    What to Expect After Surgery  Diet after surgery:  Resume your diet gradually.  Begin with clear liquids and gradually progress as you feel ready.  Surgical Site Care:  Please remain in your post-operative dressing/splint until your first post op appointment with Dr. Trejo.  Please make sure to keep your post-operative dressing clean and dry.  Please use a shower bag (e.g., [email or web address]) when showering or bathing to keep things dry. Wet padding can cause skin breakdown.  Do not stick anything down your cast or splint.  If you sustain a scratch, you are at higher risk for infection as casts and splints harbor more bacteria.  Follow Up Appointment: Please call the [facility] at [phone] or Dr. Trejo's schedulers within two (2) days of your discharge to /confirm/verify your follow-up appointment date/time with Dr. Trejo.  Typically, Dr. Trejo will want to see you 14-21 days after surgery for a post-operative follow-up appointment - before 14 days, the sutures may have to stay in and you will need to return in the 14-21 day window again.  Please arrive ~15 minutes prior to your appointment time to take care of any paperwork.      Activity Precautions:  Elevate the leg above your heart as much as possible for the first two weeks after surgery.  If the leg is higher than your heart, gravity helps decrease swelling, decrease pain, and improve blood flow.  If the leg is below your heart, swelling increases, pain can worsen, and your wound is at greater risk of infection.  Keep all weight off of your surgical leg until cleared by your physician.  Use ice packs intermittently (20 minutes on, 20 minutes off) to reduce pain and swelling, however, use extreme caution  with icing if your block is still in effect.  Make sure to have a barrier between your skin and the ice pack to avoid frost bite.   If you are in a post-operative splint, you can wiggle your toes to help push swelling to your lymph ducts, but do not try to move your ankle.      Pain Management  Nerve Blocks:  to help control pain after surgery, you may have received a nerve block where the anesthesiologist injected numbing medication around the nerves that send pain signals from your foot or ankle to your brain.  This helps you feel little to no pain.   The time a nerve block lasts varies from person to person.  Often, they will last between 12 and 24 hours but could last days.  You may not be able to move your foot and/or ankle during this time.  As the block medication wears off, you may feel a tingling sensation as the nerves start to wake up.  Keep your foot and ankle safe while it is numb.  Avoid excessive heating,  scratching, etc. until the block has completely worn off.  If icing the ankle or foot, watch the toes closely to ensure that they do not become discolored (i.e., white, red or blue)  Even if you still feel no pain in your leg before you go to bed, take a dose of your narcotic medication before you go to sleep.  You do not want to wake up with the block having worn off and being behind on pain control - it is quite difficult to 'catch up' again.  Pain Medication:    Acetaminophen (Tylenol) 500 mg tablets are typically your baseline pain medication.  Take this tablet up to once every 4 hours. Do not exceed 3,000 mg of acetaminophen daily.  You have been provided Oxycodone immediate release tablets as your initial narcotic pain reliever. Take doses of this medication if you are having severe breakthrough pain uncontrolled by the Tylenol alone. Typically, patients are taking it every 4 hours for the first day or two after surgery.  Actively wean down your use of this medication after the third day after  surgery.  Note that it takes about 30-45 minutes for oral pain medication to take effect.  DO NOT drink alcoholic beverages, use recreational drugs, or use prescription sedative medications when taking pain medication.  DO NOT operate heavy machinery/drive while taking opioids.  To avoid the risk of nausea, please make sure that you are taking your medication with food.  You may experience light headedness while taking your pain medication.  Make sure you drink plenty of water while taking narcotic pain medication to stay well hydrated and help avoid constipation.    You have been provided a Docusate prescription to help with constipation that can be a side effect of taking narcotics.  Take that medication as needed.  You have been provided a Zofran prescription to help with nausea that you can take as needed.  Itching is a common side effect to pain medication usage.  If you have an associated rash with the itching, please contact your provider.       When to Call Your Physician  Common or Normal Post-Operative Reactions:  Low grade fever (approximately 100.5 degrees) for up to one week.  Small amount of blood or fluid leaking from the surgical site or dressing  Bruising along the surgical extremity  Swelling around the surgical site and surrounding area  The reactions listed above are NORMAL, but you want to call your surgeon if any of these reactions persist.  Symptoms to Report:  Please report any of the following signs to your surgeon:  Signs of infection:  Redness or pain around your incision (if you cannot see your incision, red streaking up your extremity should be reported).  Thick, dark yellow or foul-smelling drainage at the incision site or from your dressing.  Temperature measured over 101.5 degrees for more than 24 hours despite Tylenol.  Signs of Decreased Circulation to the Ankle and Foot:  Coldness of ankle and foot  Foot or leg turning pale  Tingling/numbness (after the block has fully  resolved)  Sustained increases in pain uncontrolled by medication  Toenail bed turns blue in color  Blood Clots:  Although rare, please be aware and utilize the recommendations below to avoid getting a blood clot.  Prevention of deep vein thrombus DVT (blood clots):  Get up 4-5 times during the daytime and walk/crutch/walker across the room to keep the blood circulating in your legs. (Maintain any weightbearing restrictions, of course)  Wiggle your toes frequently if you are in a splint or case  Notify your physician if you are a nicotine user, on hormone replacement therapy medications, are taking birth control, or have a history of blood clots as these can increase your risk of developing a blood clot.  Notify your provider if you develop pain or tenderness in your calf muscle    If you have any additional questions, please contact Dr. Trejo's staff the [facility] at [phone]    IF YOU HAVE AN EMERGENCY, i.e., SHORTNESS OF BREATH, CHEST PAIN, OR SYMPTOMS SEVERE IN NATURE, PLEASE CALL 911 OR VISIT YOUR LOCAL EMERGENCY ROOM

## 2025-06-20 NOTE — ANESTHESIA PROCEDURE NOTES
Airway  Reason: elective    Date/Time: 6/20/2025 2:20 PM  Airway not difficult    General Information and Staff    Patient location during procedure: OR  CRNA/CAA: Connor Story CRNA    Indications and Patient Condition  Indications for airway management: airway protection    Preoxygenated: yes    Mask difficulty assessment: 1 - vent by mask    Final Airway Details    Final airway type: supraglottic airway      Successful airway: I-gel  Size: 4   Number of attempts at approach: 1  Assessment: lips, teeth, and gum same as pre-op    Additional Comments  LMA placed without difficulty, ventilation with assist, equal breath sounds and symmetric chest rise and fall

## 2025-06-20 NOTE — INTERVAL H&P NOTE
H&P reviewed. The patient was examined and there are no changes to the H&P.    Oliver Trejo MD  [facility]

## 2025-06-20 NOTE — PROGRESS NOTES
[facility] [place] [place] Up     Office Follow Up Visit     Date: 06/20/2025   Patient Name: Ernesto Vann  MRN: [id number]  YOB: 1945  Chief Complaint:   Chief Complaint   Patient presents with    Follow-up     1 month follow up -- 6 weeks S/P Open Reduction Internal Fixation Of Left Ankle, Syndesmotic Fixation (DOS 5/9/25)     History of Present Illness:   Ernesto Vann is a 79 y.o. male who is here today for follow-up status post left ankle ORIF.  Previous surgery was May 9.  Here today for routine follow-up.  Denies fevers chills or feeling ill.  States did not like wearing the cast.  Denies pain.  No new complaints.    Subjective   I reviewed the patient's chief complaint, history of present illness, review of systems, past medical history, surgical history, family history, social history, medications and allergy list   Objective    Vital Signs: There were no vitals filed for this visit.  There is no height or weight on file to calculate BMI.    Ortho Exam:  left LE Foot and Ankle Exam:   Cast removed for exam.  Leg compartments soft and compressible.  Incisions clean dry and intact medially, sutures in place.  Area of wound dehiscence with exposed hardware left ankle over lateral malleolus some serous drainage.  No visible purulence.  Able to actively plantarflex and dorsiflex ankle and all toes.  Appropriate swelling for this stage of healing about the foot and ankle.  Toes warm and well-perfused.  Brisk cap refill in all toes.     Results Review:  XR Ankle 3+ View Left  Left Ankle X-Ray 06/20/25   Indication: Pain  Views: 3 simulated weight bearing , comparison to previous  Findings: xrays reviewed by me today in the office and show, hardware in   place with proper alignment, no signs of hardware failure, ankle mortise   well-maintained        Assessment / Plan    Assessment/Plan:   Diagnoses and all orders for this visit:    1. Surgery  follow-up (Primary)  -     XR Ankle 3+ View Left      Returns to clinic for follow-up status post ankle ORIF.  Unfortunately has developed an area of wound dehiscence concerning for infection with exposed hardware.  Due to the nature of the exposed hardware I think this is an urgent surgical situation.  I discussed with patient my recommendation of returning to the OR for I&D and possible hardware removal.  Will consider placement of antibiotic impregnated calcium sulfate.  I may also elect to leave the hardware in place pending surgical exploration.  Will likely plan for admittance following I&D for infectious disease as well as possible PT wound care assistance in his management.  The case is scheduled for 2 PM today.    The risks and benefits of the procedure were discussed with the patient, which include but are not limited to the risk of bleeding, recurrent infection, neurovascular damage, post-operative stiffness, continued/chronic pain, need for further revision surgeries in the future, deep venous thrombosis, deformity, malunion, nonunion, hardware failure, need for further surgery, amputation, etc. We also discussed the general risks from anesthesia including death, strokes, heart attacks, blood clots, etc. We also discussed the post-operative rehabilitation, the need for physical therapy, and the overall expected outcomes from the procedure. We also discussed what would happen if we do nothing.  I allowed proper time and answered the patient's questions regarding the procedure. The patient expressed understanding. Knowing what the risks are and what the conservative treatment is, the patient elected to forgo any further conservative treatment options and proceed with the surgical intervention.     Follow Up:   Return in about 1 week for F/U after Surgery.      Oliver Trejo MD  [facility] [place] Orthopedic Surgeon

## 2025-06-21 LAB
CK SERPL-CCNC: 65 U/L (ref 20–200)
DEPRECATED RDW RBC AUTO: 48.7 FL (ref 37–54)
ERYTHROCYTE [DISTWIDTH] IN BLOOD BY AUTOMATED COUNT: 14.9 % (ref 12.3–15.4)
ERYTHROCYTE [SEDIMENTATION RATE] IN BLOOD: 30 MM/HR (ref 0–20)
HCT VFR BLD AUTO: 33.9 % (ref 37.5–51)
HGB BLD-MCNC: 11.8 G/DL (ref 13–17.7)
MCH RBC QN AUTO: 31.1 PG (ref 26.6–33)
MCHC RBC AUTO-ENTMCNC: 34.8 G/DL (ref 31.5–35.7)
MCV RBC AUTO: 89.2 FL (ref 79–97)
PLATELET # BLD AUTO: 320 10*3/MM3 (ref 140–450)
PMV BLD AUTO: 8.4 FL (ref 6–12)
RBC # BLD AUTO: 3.8 10*6/MM3 (ref 4.14–5.8)
WBC NRBC COR # BLD AUTO: 6.98 10*3/MM3 (ref 3.4–10.8)

## 2025-06-21 PROCEDURE — 25010000002 CEFAZOLIN PER 500 MG: Performed by: ORTHOPAEDIC SURGERY

## 2025-06-21 PROCEDURE — G0378 HOSPITAL OBSERVATION PER HR: HCPCS

## 2025-06-21 PROCEDURE — 97165 OT EVAL LOW COMPLEX 30 MIN: CPT

## 2025-06-21 PROCEDURE — 63710000001 SERTRALINE 100 MG TABLET: Performed by: ORTHOPAEDIC SURGERY

## 2025-06-21 PROCEDURE — 85652 RBC SED RATE AUTOMATED: CPT | Performed by: ORTHOPAEDIC SURGERY

## 2025-06-21 PROCEDURE — A9270 NON-COVERED ITEM OR SERVICE: HCPCS | Performed by: ORTHOPAEDIC SURGERY

## 2025-06-21 PROCEDURE — 63710000001 GABAPENTIN 100 MG CAPSULE: Performed by: ORTHOPAEDIC SURGERY

## 2025-06-21 PROCEDURE — 25010000002 PIPERACILLIN SOD-TAZOBACTAM PER 1 G: Performed by: NURSE PRACTITIONER

## 2025-06-21 PROCEDURE — 97162 PT EVAL MOD COMPLEX 30 MIN: CPT

## 2025-06-21 PROCEDURE — 63710000001 METOPROLOL SUCCINATE XL 100 MG TABLET SUSTAINED-RELEASE 24 HOUR: Performed by: ORTHOPAEDIC SURGERY

## 2025-06-21 PROCEDURE — 63710000001 OXYBUTYNIN XL 10 MG TABLET SUSTAINED-RELEASE 24 HOUR: Performed by: ORTHOPAEDIC SURGERY

## 2025-06-21 PROCEDURE — 99024 POSTOP FOLLOW-UP VISIT: CPT | Performed by: ORTHOPAEDIC SURGERY

## 2025-06-21 PROCEDURE — 63710000001 AMLODIPINE 10 MG TABLET: Performed by: ORTHOPAEDIC SURGERY

## 2025-06-21 PROCEDURE — 63710000001 ATORVASTATIN 20 MG TABLET: Performed by: ORTHOPAEDIC SURGERY

## 2025-06-21 PROCEDURE — 25010000002 DAPTOMYCIN PER 1 MG: Performed by: NURSE PRACTITIONER

## 2025-06-21 PROCEDURE — 85027 COMPLETE CBC AUTOMATED: CPT | Performed by: ORTHOPAEDIC SURGERY

## 2025-06-21 PROCEDURE — 97116 GAIT TRAINING THERAPY: CPT

## 2025-06-21 PROCEDURE — 82550 ASSAY OF CK (CPK): CPT | Performed by: NURSE PRACTITIONER

## 2025-06-21 PROCEDURE — 63710000001 SERTRALINE 50 MG TABLET: Performed by: ORTHOPAEDIC SURGERY

## 2025-06-21 PROCEDURE — 63710000001 ACETAMINOPHEN 325 MG TABLET: Performed by: ORTHOPAEDIC SURGERY

## 2025-06-21 PROCEDURE — 63710000001 QUETIAPINE 25 MG TABLET: Performed by: ORTHOPAEDIC SURGERY

## 2025-06-21 RX ADMIN — PIPERACILLIN AND TAZOBACTAM 4.5 G: 4; .5 INJECTION, POWDER, FOR SOLUTION INTRAVENOUS at 23:01

## 2025-06-21 RX ADMIN — SERTRALINE 100 MG: 100 TABLET, FILM COATED ORAL at 09:20

## 2025-06-21 RX ADMIN — OXYBUTYNIN CHLORIDE 10 MG: 10 TABLET, EXTENDED RELEASE ORAL at 09:20

## 2025-06-21 RX ADMIN — Medication 10 ML: at 19:49

## 2025-06-21 RX ADMIN — GABAPENTIN 100 MG: 100 CAPSULE ORAL at 19:48

## 2025-06-21 RX ADMIN — DAPTOMYCIN 400 MG: 500 INJECTION, POWDER, LYOPHILIZED, FOR SOLUTION INTRAVENOUS at 12:51

## 2025-06-21 RX ADMIN — SERTRALINE 50 MG: 50 TABLET, FILM COATED ORAL at 09:33

## 2025-06-21 RX ADMIN — ACETAMINOPHEN 650 MG: 325 TABLET ORAL at 04:06

## 2025-06-21 RX ADMIN — QUETIAPINE FUMARATE 50 MG: 25 TABLET ORAL at 19:49

## 2025-06-21 RX ADMIN — ATORVASTATIN CALCIUM 20 MG: 20 TABLET, FILM COATED ORAL at 19:48

## 2025-06-21 RX ADMIN — PIPERACILLIN AND TAZOBACTAM 4.5 G: 4; .5 INJECTION, POWDER, FOR SOLUTION INTRAVENOUS at 17:22

## 2025-06-21 RX ADMIN — SODIUM CHLORIDE 2 G: 900 INJECTION INTRAVENOUS at 05:51

## 2025-06-21 RX ADMIN — ACETAMINOPHEN 650 MG: 325 TABLET ORAL at 15:25

## 2025-06-21 RX ADMIN — ACETAMINOPHEN 650 MG: 325 TABLET ORAL at 09:20

## 2025-06-21 RX ADMIN — GABAPENTIN 100 MG: 100 CAPSULE ORAL at 09:20

## 2025-06-21 RX ADMIN — AMLODIPINE BESYLATE 10 MG: 10 TABLET ORAL at 09:20

## 2025-06-21 RX ADMIN — Medication 10 ML: at 09:33

## 2025-06-21 RX ADMIN — PIPERACILLIN AND TAZOBACTAM 4.5 G: 4; .5 INJECTION, POWDER, FOR SOLUTION INTRAVENOUS at 09:19

## 2025-06-21 RX ADMIN — ACETAMINOPHEN 650 MG: 325 TABLET ORAL at 21:40

## 2025-06-21 RX ADMIN — METOPROLOL SUCCINATE 200 MG: 100 TABLET, EXTENDED RELEASE ORAL at 09:20

## 2025-06-21 NOTE — PLAN OF CARE
Goal Outcome Evaluation:  Plan of Care Reviewed With: patient        Progress: no change  Outcome Evaluation: OT eval complete. Pt presents below baseline with limited endurance, decreased safety awareness, ADL performance, and balance deficits. Pt needing VC at beginning of session as reminders for NWB status. SBA for bed mobility. Pt stating that he did not maintain NWB status at home. Recommend IPOT POC and IRF at D/C.    Anticipated Discharge Disposition (OT): inpatient rehabilitation facility

## 2025-06-21 NOTE — PLAN OF CARE
Oriented x 4 with forgetfulness, patient follows commands. LLE elevated to the level of heart per order. Elastic bandage CDI. Henge immobilizer in place. SBP remains elevated. On RA. Patient cont to deny incisional pain / discomfort, c/o intermittent right shoulder aches.  Voids independently without difficulty. NWB status maintained. Call light in reach.

## 2025-06-21 NOTE — PROGRESS NOTES
"          Orthopaedic Surgery Progress Note    LOS: 0 days   Patient Care Team:  Mali Galeana MD as PCP - General (Internal Medicine)  Victorino Emerson Jr., MD as Referring Physician (Urology)  Huey Zuleta MD as Consulting Physician (Radiation Oncology)  Lex Nicolas MD as Consulting Physician (Gastroenterology)  Veronica Vázquez APRN as Nurse Practitioner (Nurse Practitioner)  Siva Franec MD as Consulting Physician (Interventional Cardiology)  Catrachito Sena MD as Consulting Physician (Cardiothoracic Surgery)  1 Day Post-Op   Procedure(s):  LEFT ANKLE INCISION AND DRAINAGE  Subjective     Interval History:   Resting in bed, denies pain. Denies fevers/chills/feeling ill. No acute events. No new complaints. Asking about going home.     Objective     Vital Signs:  Temp (24hrs), Av.5 °F (36.4 °C), Min:97 °F (36.1 °C), Max:97.9 °F (36.6 °C)    /81 (BP Location: Left arm, Patient Position: Lying)   Pulse 66   Temp 97.7 °F (36.5 °C) (Oral)   Resp 16   Ht 177.8 cm (70\")   Wt 69.4 kg (153 lb)   SpO2 96%   BMI 21.95 kg/m²   Body mass index is 21.95 kg/m².    Labs:  Lab Results (last 24 hours)       Procedure Component Value Units Date/Time    Sedimentation Rate [[id number]]  (Abnormal) Collected: 25 0720    Specimen: Blood Updated: 25 0835     Sed Rate 30 mm/hr     CBC (No Diff) [[id number]]  (Abnormal) Collected: 25 0720    Specimen: Blood Updated: 25 0754     WBC 6.98 10*3/mm3      RBC 3.80 10*6/mm3      Hemoglobin 11.8 g/dL      Hematocrit 33.9 %      MCV 89.2 fL      MCH 31.1 pg      MCHC 34.8 g/dL      RDW 14.9 %      RDW-SD 48.7 fl      MPV 8.4 fL      Platelets 320 10*3/mm3     Tissue / Bone Culture - Bone, Ankle, Left [[id number]] Collected: 25 1616    Specimen: Bone from Ankle, Left Updated: 25 0712     Tissue Culture No growth     Gram Stain Moderate (3+) WBCs per low power field      No organisms seen    Wound Culture - " Surgical Site, Ankle, Left [[id number]] Collected: 06/20/25 1618    Specimen: Surgical Site from Ankle, Left Updated: 06/21/25 0718     Wound Culture No growth     Gram Stain Rare (1+) WBCs per low power field      No epithelial cells seen      No organisms seen    Anaerobic Culture 10 Day Incubation - Bone, Ankle, Left [[id number]] Collected: 06/20/25 1616    Specimen: Bone from Ankle, Left Updated: 06/20/25 1944    Fungus Culture - Bone, Ankle, Left [[id number]] Collected: 06/20/25 1616    Specimen: Bone from Ankle, Left Updated: 06/20/25 1944    AFB Culture - Bone, Ankle, Left [[id number]] Collected: 06/20/25 1616    Specimen: Bone from Ankle, Left Updated: 06/20/25 1944    Anaerobic Culture - Surgical Site, Ankle, Left [[id number]] Collected: 06/20/25 1618    Specimen: Surgical Site from Ankle, Left Updated: 06/20/25 1931    Fungus Culture - Surgical Site, Ankle, Left [[id number]] Collected: 06/20/25 1618    Specimen: Surgical Site from Ankle, Left Updated: 06/20/25 1931    AFB Culture - Surgical Site, Ankle, Left [[id number]] Collected: 06/20/25 1618    Specimen: Surgical Site from Ankle, Left Updated: 06/20/25 1931    Basic Metabolic Panel [[id number]]  (Abnormal) Collected: 06/20/25 1725    Specimen: Blood Updated: 06/20/25 1812     Glucose 125 mg/dL      BUN 6.6 mg/dL      Creatinine 0.54 mg/dL      Sodium 125 mmol/L      Potassium 3.9 mmol/L      Chloride 89 mmol/L      CO2 23.0 mmol/L      Calcium 8.8 mg/dL      BUN/Creatinine Ratio 12.2     Anion Gap 13.0 mmol/L      eGFR 102.0 mL/min/1.73     Narrative:      GFR Categories in Chronic Kidney Disease (CKD)              GFR Category          GFR (mL/min/1.73)    Interpretation  G1                    90 or greater        Normal or high (1)  G2                    60-89                Mild decrease (1)  G3a                   45-59                Mild to moderate decrease  G3b                   30-44                Moderate to severe decrease  G4                     15-29                Severe decrease  G5                    14 or less           Kidney failure    (1)In the absence of evidence of kidney disease, neither GFR category G1 or G2 fulfill the criteria for CKD.    eGFR calculation 2021 CKD-EPI creatinine equation, which does not include race as a factor    C-reactive Protein [[id number]]  (Abnormal) Collected: 06/20/25 1725    Specimen: Blood Updated: 06/20/25 1812     C-Reactive Protein 4.12 mg/dL     POC Glucose Once [[id number]]  (Normal) Collected: 06/20/25 1618    Specimen: Blood Updated: 06/20/25 1619     Glucose 120 mg/dL     POC Surgery Labs [[id number]]  (Abnormal) Collected: 06/20/25 1400    Specimen: Blood Updated: 06/20/25 1553     Ionized Calcium 1.14 mmol/L      POC Potassium 4.0 mmol/L      Sodium 123 mmol/L      Total CO2 26 mmol/L      Hemoglobin 13.9 g/dL      Hematocrit 41 %      pCO2, Arterial 41.3 mm Hg      pO2, Arterial 33 mmHg      Comment: Serial Number: [id number]:  [id number]        Base Excess -1.0000 mmol/L      O2 Saturation, Arterial 63 %      pH, Arterial 7.38 pH units      HCO3, Arterial 24.5 mmol/L      Glucose 122 mg/dL     POC Glucose Once [[id number]]  (Normal) Collected: 06/20/25 1325    Specimen: Blood Updated: 06/20/25 1328     Glucose 128 mg/dL             Physical Exam:  left LE: Boot and dressing removed for exam, SS on dressing,   Lateral ankle incision clean and intact with sutures in place   compartments soft/compressible let   +EHL/FHL/GSC/TA   SILT DP/SP/SN/Saph/Tib   Palpable DP, CR brisk in all toes    Assessment/Plan:  1 Day Post-Op status post:  Procedure(s):  LEFT ANKLE INCISION AND DRAINAGE    -NWB operative extremity in boot  -Advance diet as tolerated  -Keep operative dressing clean and dry  -DVT prophylaxis, mechanical and plavix, hold plavix for now, plan to restart 6/22  -Consult ID, appreciate recs, abx per ID  -Pain control  -PT/OT  -Follow-up Intra-Op cultures NGTD  -Elevate operative extremity  -Consult  medicine appreciate recs, chronic hyponatremia  -Dispo pending    Oliver Trejo MD  06/21/25  08:44 EDT

## 2025-06-21 NOTE — PLAN OF CARE
Goal Outcome Evaluation:                   Pt is alert and oriented x 4; On room air; VSS; IV is new, old IV was leaking; IV is currently infusing; Pt is forget at times, but still remains very oriented and alert; Left leg is to be elevated at all times above heart level; Patient is pleasant.

## 2025-06-21 NOTE — CONSULTS
INFECTIOUS DISEASE CONSULT/INITIAL HOSPITAL VISIT    Ernesto Vann  1945  [id number]    Date of Consult: 6/21/2025    Admission Date: 6/20/2025      Requesting Provider: Oliver Trejo MD  Evaluating Physician: LUCY Trent    Reason for Consultation: surgical site infection/osteomyelitis     History of present illness:    Patient is a 79 y.o. male, with PMH alcohol abuse, prostate cancer, COPD, DDD, DM, GERD, PAD, seen today for a surgical site infection/osteomyelitis.  Recent admission here, for left ankle fracture after a mechanical fall, undergoing an ORIF 5/9/25. He was seen yesterday in followup by Dr. Trejo, cast removed, with noted area of wound dehiscence and exposed hardware of left ankle. Admitted and underwent deep hardware removal, debridement. He has been afebrile.  Admitting labs with SCr 0.54, CRP 4.12, WBC 6.98, .  Surgical culture no growth so far.    We were consulted for evaluation and treatment.   He denies fever, chills, sweats.     Past Medical History:   Diagnosis Date    Alcohol abuse     Allergic     Anxiety     Arthritis     Benign colon polyp     Cancer prostate    PROSTATE CANCER    Cataract     Chronic obstructive pulmonary disease 06/17/2016    COPD (chronic obstructive pulmonary disease)     DDD (degenerative disc disease), lumbar     Depression     Diabetes mellitus     Dyslipidemia     Elevated PSA 12/2018    GERD (gastroesophageal reflux disease)     Head injury     Hyperlipidemia     Hypertension     Inguinal hernia     Irritable bowel syndrome with both constipation and diarrhea 05/15/2024    PAD (peripheral artery disease)     Peripheral neuropathy     Prostate cancer     SAH (subarachnoid hemorrhage) 06/30/2021    Tobacco abuse        Past Surgical History:   Procedure Laterality Date    ANKLE OPEN REDUCTION INTERNAL FIXATION Left 05/09/2025    Procedure: OPEN REDUCTION INTERNAL FIXATION OF LEFT ANKLE, SYNDESMOTIC FIXATION;  Surgeon: Maya  MD Oliver;  Location:  [facility];  Service: Orthopedics;  Laterality: Left;    BELPHAROPTOSIS REPAIR Bilateral 2019    COLONOSCOPY      EYE SURGERY  Cataract    cataract    FEMORAL ARTERY - FEMORAL ARTERY BYPASS GRAFT      FEMORAL ARTERY STENT      INGUINAL HERNIA REPAIR Right     LUMBAR SYMPATHETIC NERVE BLOCK      PROSTATECTOMY N/A 2016    Procedure: PROSTATECTOMY LAPAROSCOPIC WITH DAVINCI ROBOT WITH NODES;  Surgeon: Victorino Emerson Jr., MD;  Location:  [facility];  Service:     TONSILLECTOMY      TONSILLECTOMY AND ADENOIDECTOMY      VASCULAR SURGERY      VASECTOMY      VEIN SURGERY         Family History   Problem Relation Age of Onset    Cancer Mother     Hypertension Mother     Dementia Mother     Heart disease Father     Heart attack Father     Breast cancer Sister     Brain cancer Sister     Lung cancer Sister     Dementia Maternal Grandmother     Cancer Sister     Cancer Sister        Social History     Socioeconomic History    Marital status:     Number of children: 1   Tobacco Use    Smoking status: Former     Current packs/day: 0.00     Average packs/day: 1 pack/day for 30.0 years (30.0 ttl pk-yrs)     Types: Cigarettes     Start date: 2000     Quit date: 2023     Years since quittin.9     Passive exposure: Past    Smokeless tobacco: Never    Tobacco comments:     HAS SMOKED 1PPD IN THE PAST   Vaping Use    Vaping status: Never Used   Substance and Sexual Activity    Alcohol use: Not Currently     Alcohol/week: 30.0 - 35.0 standard drinks of alcohol     Types: 30 - 35 Glasses of wine per week     Comment: admits to 6 daily    Drug use: No    Sexual activity: Defer       Allergies   Allergen Reactions    Diclofenac GI Bleeding and Unknown (See Comments)    Tofranil [Imipramine Hcl] Other (See Comments)     Syncope, falls    Lisinopril Cough         Medication:    Current Facility-Administered Medications:     acetaminophen (TYLENOL) tablet 650 mg, 650 mg, Oral, Q4H PRN,  650 mg at 06/21/25 0406 **OR** acetaminophen (TYLENOL) suppository 650 mg, 650 mg, Rectal, Q4H PRN, Oliver Trejo MD    amLODIPine (NORVASC) tablet 10 mg, 10 mg, Oral, Daily, Oliver Trejo MD, 10 mg at 06/20/25 1825    atorvastatin (LIPITOR) tablet 20 mg, 20 mg, Oral, Nightly, Oliver Trejo MD, 20 mg at 06/20/25 1958    bisacodyl (DULCOLAX) suppository 10 mg, 10 mg, Rectal, Daily PRN, Oliver Trejo MD    ceFAZolin 2000 mg IVPB in 100 mL NS (MBP), 2 g, Intravenous, Q8H, Oliver Trejo MD, 2 g at 06/21/25 0551    [Held by provider] clopidogrel (PLAVIX) tablet 75 mg, 75 mg, Oral, Daily, Oliver Trejo MD    gabapentin (NEURONTIN) capsule 100 mg, 100 mg, Oral, Q12H, Oliver Trejo MD, 100 mg at 06/20/25 1958    HYDROmorphone (DILAUDID) injection 0.5 mg, 0.5 mg, Intravenous, Q2H PRN **AND** naloxone (NARCAN) injection 0.1 mg, 0.1 mg, Intravenous, Q5 Min PRN, Oliver Trejo MD    labetalol (NORMODYNE,TRANDATE) injection 10 mg, 10 mg, Intravenous, Q4H PRN, Sapna Aguilar MD    lactated ringers infusion, 9 mL/hr, Intravenous, Continuous, Eryn Azar CRNA    melatonin tablet 5 mg, 5 mg, Oral, Nightly PRN, Oliver Trejo MD    metoprolol succinate XL (TOPROL-XL) 24 hr tablet 200 mg, 200 mg, Oral, Daily, Oliver Trejo MD    oxybutynin XL (DITROPAN-XL) 24 hr tablet 10 mg, 10 mg, Oral, Daily, Oliver Trejo MD    oxyCODONE (ROXICODONE) immediate release tablet 5 mg, 5 mg, Oral, Q4H PRN, Oliver Trejo MD    polyethylene glycol (MIRALAX) packet 17 g, 17 g, Oral, PRN, Oliver Trejo MD    QUEtiapine (SEROquel) tablet 50 mg, 50 mg, Oral, Nightly, Oliver Trejo MD, 50 mg at 06/20/25 1958    sennosides-docusate (PERICOLACE) 8.6-50 MG per tablet 2 tablet, 2 tablet, Oral, BID PRN, Oliver Trejo MD    sertraline (ZOLOFT) tablet 100 mg, 100 mg, Oral, Daily, Oliver Trejo MD    sertraline (ZOLOFT) tablet 50 mg, 50 mg, Oral, Daily, Oliver Trejo MD    sodium chloride 0.9 % bolus 500 mL, 500 mL, Intravenous, TID PRN,  Sapna Aguilar MD    sodium chloride 0.9 % flush 10 mL, 10 mL, Intravenous, Q12H, Oliver Trejo MD, 10 mL at 25    sodium chloride 0.9 % flush 10 mL, 10 mL, Intravenous, PRN, Oliver Trejo MD    sodium chloride 0.9 % infusion 40 mL, 40 mL, Intravenous, PRN, Oliver Trejo MD    sodium chloride 0.9 % infusion, 100 mL/hr, Intravenous, Continuous, Oliver Trejo MD, Last Rate: 100 mL/hr at 25 182, 100 mL/hr at 25 182    Antibiotics:  Anti-Infectives (From admission, onward)      Ordered     Dose/Rate Route Frequency Start Stop    25 1724  ceFAZolin 2000 mg IVPB in 100 mL NS (MBP)        Ordering Provider: Oliver Trejo MD    2 g  over 30 Minutes Intravenous Every 8 Hours 25 2300 25 2257              Review of Systems:  Constitutional-- No Fever, chills or sweats.  Appetite good, and no malaise. No fatigue.  HEENT-- No new vision, hearing or throat complaints.  No epistaxis or oral sores.  Denies odynophagia or dysphagia. No headache, photophobia or neck stiffness.  CV-- No chest pain, palpitation or syncope  Resp-- No SOB/cough/Hemoptysis  GI- No nausea, vomiting, or diarrhea.  No hematochezia, melena, or hematemesis. Denies jaundice or chronic liver disease.  -- No dysuria, hematuria, or flank pain.  Denies hesitancy, urgency or flank pain.  Heme- No active bruising or bleeding; no Hx of DVT or PE.  MS-- no swelling or pain in the bones or joints of arms/legs.  No new back pain.  Neuro-- No acute focal weakness or numbness in the arms or legs.  No seizures.  Skin--No rashes  Left foot was casted prior to admisison      Physical Exam:   Vital Signs  Temp (24hrs), Av.5 °F (36.4 °C), Min:97 °F (36.1 °C), Max:97.9 °F (36.6 °C)    Temp  Min: 97 °F (36.1 °C)  Max: 97.9 °F (36.6 °C)  BP  Min: 118/71  Max: 190/90  Pulse  Min: 60  Max: 74  Resp  Min: 12  Max: 18  SpO2  Min: 94 %  Max: 100 %    GENERAL: Awake and alert, in no acute distress.   HEENT: Normocephalic,  "atraumatic.  PERRL. EOMI. No conjunctival injection. No icterus. Oropharynx clear without evidence of thrush or exudate. No evidence of periodontal disease.      HEART: RRR; No murmur, rubs, gallops.   LUNGS: Clear to auscultation bilaterally   ABDOMEN: Soft, nontender, nondistended.   EXT:  No cyanosis, clubbing or edema.   Right foot in boot/surgical dressing in place   :  Without Hunter catheter.  MSK: No joint effusions or erythema  SKIN: Warm and dry without cutaneous eruptions on Inspection/palpation.    NEURO: Oriented to PPT.  Motor 5/5 strength  PSYCHIATRIC: Normal insight and judgment. Cooperative with PE    Laboratory Data    Results from last 7 days   Lab Units 06/21/25  0720 06/20/25  1400   WBC 10*3/mm3 6.98  --    HEMOGLOBIN g/dL 11.8*  --    HEMOGLOBIN, POC g/dL  --  13.9   HEMATOCRIT % 33.9*  --    HEMATOCRIT POC %  --  41   PLATELETS 10*3/mm3 320  --      Results from last 7 days   Lab Units 06/20/25  1725   SODIUM mmol/L 125*   POTASSIUM mmol/L 3.9   CHLORIDE mmol/L 89*   CO2 mmol/L 23.0   BUN mg/dL 6.6*   CREATININE mg/dL 0.54*   GLUCOSE mg/dL 125*   CALCIUM mg/dL 8.8             Results from last 7 days   Lab Units 06/20/25  1725   CRP mg/dL 4.12*                 Estimated Creatinine Clearance: 110.7 mL/min (A) (by C-G formula based on SCr of 0.54 mg/dL (L)).      Microbiology:  No results found for: \"ACANTHNAEG\", \"AFBCX\", \"BPERTUSSISCX\", \"BLOODCX\"  No results found for: \"BCIDPCR\", \"CXREFLEX\", \"CSFCX\", \"CULTURETIS\"  No results found for: \"CULTURES\", \"HSVCX\", \"URCX\"  No results found for: \"EYECULTURE\", \"GCCX\", \"HSVCULTURE\", \"LABHSV\"  No results found for: \"LEGIONELLA\", \"MRSACX\", \"MUMPSCX\", \"MYCOPLASCX\"  No results found for: \"NOCARDIACX\", \"STOOLCX\"  No results found for: \"THROATCX\", \"UNSTIMCULT\", \"URINECX\", \"CULTURE\", \"VZVCULTUR\"  Wound Culture   Date Value Ref Range Status   06/20/2025 No growth  Preliminary           Radiology:  Imaging Results (Last 72 Hours)       Procedure Component Value " Units Date/Time    FL C Arm During Surgery [[id number]] Resulted: 06/20/25 1540     Updated: 06/20/25 1540    Narrative:      This procedure was auto-finalized with no dictation required.              Impression:   - Left ankle wound dehiscence/osteomyelitis- surgical incision dehiscence/exposed hardware  - Left ankle fracture/ORIF 5/9/25  - s/p hardware removal/I & D left ankle- cultures negative to date   - History of alcohol abuse   - COPD      PLAN/RECOMMENDATIONS:   Thank you for asking us to see Ernesto Vann, I recommend the following:  - Daptomycin 6mg/kg IV daily  - CK  - Zosyn 4.5g  IV q 8hrs  - follow cultures    Dr. Salmeron has obtained the history, performed the PE, formulated the above treatment plan   Dehiscence and exposure of hardware is concerning for hardware infection.    Patient was no systemically ill prior to the cast being taken down we need to consider coagulase-negative staph, Propionibacterium acne as potential pathogens    Reasonable to cover with daptomycin    DC Zosyn start ceftriaxone 2 g IV daily    I have discussed the case with family at length patient is wanting to go home soon it may be an option to arrange for outpatient antibiotics through our office however we need to consider patient's strength, ability to transfer.  I will inquire from orthopedics if patient can be treated as an outpatient or if rehabilitation is required  This visit included the following complex service elements:  Complex medical decision-making associated with antimicrobial prescribing.  Counseled patients, family members, and/or caregivers regarding antimicrobial stewardship and antibiotic resistance.    No role for isolation currently       LUCY Trent  6/21/2025  08:33 EDT

## 2025-06-21 NOTE — THERAPY EVALUATION
Patient Name: Ernesto Vann  : 1945    MRN: [id number]                              Today's Date: 2025       Admit Date: 2025    Visit Dx:     ICD-10-CM ICD-9-CM   1. Infection of bone of left ankle  M86.9 730.97     Patient Active Problem List   Diagnosis    Chronic obstructive pulmonary disease    Essential hypertension    Alcohol abuse    History of tobacco abuse    DDD (degenerative disc disease), lumbar    Generalized anxiety disorder    Hyperlipidemia    PVD (peripheral vascular disease)    Neuropathy    Snoring    Dizziness    Fatigue    Weight loss    Hyponatremia    Prostate cancer    S/P prostatectomy, robotic assisted, radical with LNs    Raynaud's phenomenon without gangrene    Critical polytrauma    Hypokalemia    Orbital floor fracture    SAH (subarachnoid hemorrhage)    Seasonal allergies    Subdural hematoma    Syncope    Blurred vision    Irritable bowel syndrome with both constipation and diarrhea    Major depressive disorder, recurrent episode, moderate    Closed fracture of left ankle    Ankle fracture    S/P ORIF (open reduction internal fixation) fracture, left ankle, ORIF Synesmosis    Malignant neoplasm of prostate    Surgery follow-up    Surgical site infection    Postoperative wound infection, left ankle, status post incision and debridement, hardware removal, antibiotic matrix placement     Past Medical History:   Diagnosis Date    Alcohol abuse     Allergic     Anxiety     Arthritis     Benign colon polyp     Cancer prostate    PROSTATE CANCER    Cataract     Chronic obstructive pulmonary disease 2016    COPD (chronic obstructive pulmonary disease)     DDD (degenerative disc disease), lumbar     Depression     Diabetes mellitus     Dyslipidemia     Elevated PSA 2018    GERD (gastroesophageal reflux disease)     Head injury     Hyperlipidemia     Hypertension     Inguinal hernia     Irritable bowel syndrome with both constipation and diarrhea 05/15/2024     "PAD (peripheral artery disease)     Peripheral neuropathy     Prostate cancer     SAH (subarachnoid hemorrhage) 06/30/2021    Tobacco abuse      Past Surgical History:   Procedure Laterality Date    ANKLE OPEN REDUCTION INTERNAL FIXATION Left 05/09/2025    Procedure: OPEN REDUCTION INTERNAL FIXATION OF LEFT ANKLE, SYNDESMOTIC FIXATION;  Surgeon: Oliver Trejo MD;  Location:  [facility];  Service: Orthopedics;  Laterality: Left;    BELPHAROPTOSIS REPAIR Bilateral 09/01/2019    COLONOSCOPY      EYE SURGERY  Cataract    cataract    FEMORAL ARTERY - FEMORAL ARTERY BYPASS GRAFT      FEMORAL ARTERY STENT      INGUINAL HERNIA REPAIR Right     LUMBAR SYMPATHETIC NERVE BLOCK      PROSTATECTOMY N/A 11/22/2016    Procedure: PROSTATECTOMY LAPAROSCOPIC WITH [employer] ROBOT WITH NODES;  Surgeon: Victorino Emerson Jr., MD;  Location:  [facility];  Service:     TONSILLECTOMY      TONSILLECTOMY AND ADENOIDECTOMY      VASCULAR SURGERY      VASECTOMY      VEIN SURGERY        General Information       Row Name 06/21/25 1541          Physical Therapy Time and Intention    Document Type evaluation  -CD     Mode of Treatment physical therapy  -CD       Row Name 06/21/25 1541          General Information    Patient Profile Reviewed yes  -CD     Prior Level of Function independent:;all household mobility;community mobility;gait;transfer;bed mobility;ADL's;driving  PRIOR TO ANKLE SURGERY 5/9/25. PT WAS ADAMANT TO BE DISCHARGED HOME ON 5/10/25 BUT ENDORSES THAT HE DID NOT MAINTAIN STRICT NWB STATUS L LE ONCE HOME. PT HAS R WALKER, W/C AND SHOWER CHAIR.  -CD     Existing Precautions/Restrictions fall;non-weight bearing;left  NWB L LE IN CAM BOOT. PT IS S/P I&D L ANKLE WOUND  AND REMOVAL OF ANKLE HARDWARE.  -CD     Barriers to Rehab previous functional deficit;ineffective coping  WHEN REC FOR IRF MENTIONED, PT REPORTS HE WOULD HAVE A \"NERVOUS BREAKDOWN\" IF COULD NOT GO HOME. PT WAS ADAMANT TO GO HOME AT LAST ADMIT BUT DID NOT MAINTAIN NWB STATUS. "  -CD       Row Name 06/21/25 1541          Living Environment    Current Living Arrangements home  -CD     People in Home spouse  -CD       Row Name 06/21/25 1541          Home Main Entrance    Number of Stairs, Main Entrance none  -CD       Row Name 06/21/25 1541          Stairs Within Home, Primary    Number of Stairs, Within Home, Primary none  -CD       Row Name 06/21/25 1541          Cognition    Orientation Status (Cognition) oriented x 3  -CD       Row Name 06/21/25 1541          Safety Issues/Impairments Affecting Functional Mobility    Safety Issues Affecting Function (Mobility) safety precautions follow-through/compliance;sequencing abilities;safety precaution awareness;positioning of assistive device;insight into deficits/self-awareness;awareness of need for assistance;impulsivity;judgment;problem-solving;steps too close to assistive device  -CD     Impairments Affecting Function (Mobility) balance;coordination;endurance/activity tolerance;strength;pain;postural/trunk control  -CD     Comment, Safety Issues/Impairments (Mobility) PT IS IMPULSIVE AND NEEDS CUES FOR SAFETY AND SEQUENCING MOBILITY. HAS DIFFICULTY MAINTAINING NWB STATUS L LE AS FATIGUES WITH MOBILITY.  -CD               User Key  (r) = Recorded By, (t) = Taken By, (c) = Cosigned By      Initials Name Provider Type    CD Romelia Rodríguez, PT Physical Therapist                   Mobility       Row Name 06/21/25 1549          Bed Mobility    Bed Mobility supine-sit  -CD     Supine-Sit [place] (Bed Mobility) standby assist  -CD     Assistive Device (Bed Mobility) bed rails;head of bed elevated  -CD     Comment, (Bed Mobility) DENIED DIZZINESS.  -CD       Row Name 06/21/25 1541          Transfers    Comment, (Transfers) CUES FOR HAND PLACEMENT, NWB STATUS L LE. STS FROM EOB AND RECLINER, STAND PIVOT BED TO RECLINER.  -CD       Row Name 06/21/25 1544          Bed-Chair Transfer    Bed-Chair [place] (Transfers) moderate assist (50% patient  effort);2 person assist  -CD     Assistive Device (Bed-Chair Transfers) walker, front-wheeled  -CD       Row Name 06/21/25 1549          Sit-Stand Transfer    Sit-Stand [place] (Transfers) minimum assist (75% patient effort)  -CD     Assistive Device (Sit-Stand Transfers) walker, front-wheeled  -CD       Row Name 06/21/25 1549          Gait/Stairs (Locomotion)    [place] Level (Gait) moderate assist (50% patient effort);1 person to manage equipment  -CD     Assistive Device (Gait) walker, front-wheeled  -CD     Distance in Feet (Gait) 6  -CD     Deviations/Abnormal Patterns (Gait) gait speed decreased;liam decreased  -CD     Bilateral Gait Deviations forward flexed posture  -CD     Comment, (Gait/Stairs) HOP TO GAIT PATTERN, NWB L LE WITH R WALKER AND MAX CUES. REQUIRED CLOSE CHAIR FOLLOW. HAS DIFFICULTY MAINTAINING NWB STATUS L LE AS FATIGUES.  -CD               User Key  (r) = Recorded By, (t) = Taken By, (c) = Cosigned By      Initials Name Provider Type    CD Romelia Rodríguez, PT Physical Therapist                   Obj/Interventions       Row Name 06/21/25 1552          Range of Motion Comprehensive    General Range of Motion bilateral lower extremity ROM WFL  -CD     Comment, General Range of Motion L ANKLE NT.  -CD       Row Name 06/21/25 8389          Strength Comprehensive (MMT)    General Manual Muscle Testing (MMT) Assessment lower extremity strength deficits identified  -CD     Comment, General Manual Muscle Testing (MMT) Assessment B LE GROSSLY 4+/5. L ANKLE NT.  -CD       Row Name 06/21/25 1554          Motor Skills    Motor Skills functional endurance  -CD     Functional Endurance PT FATIGUES QUICKLY WITH ATTEMPTING NWB STATUS L LE.  -CD       Row Name 06/21/25 8475          Balance    Balance Assessment sitting static balance;standing static balance;standing dynamic balance;sitting dynamic balance  -CD     Static Sitting Balance independent  -CD     Dynamic Sitting Balance independent  -CD      Position, Sitting Balance unsupported;sitting in chair;sitting edge of bed  -CD     Static Standing Balance minimal assist  -CD     Dynamic Standing Balance moderate assist  -CD     Position/Device Used, Standing Balance walker, rolling  -CD     Balance Interventions sitting;standing;sit to stand;supported;static;dynamic  -CD     Comment, Balance PT IS UNSTEADY WITH ATTEMPTS AT HOP 2 GAIT PATTERN FOR NWB STATUS L LE DESPITE USE OF R WALKER.  -CD               User Key  (r) = Recorded By, (t) = Taken By, (c) = Cosigned By      Initials Name Provider Type    CD Romelia Rodríguez, PT Physical Therapist                   Goals/Plan       Row Name 06/21/25 1611          Transfer Goal 1 (PT)    Activity/Assistive Device (Transfer Goal 1, PT) sit-to-stand/stand-to-sit;bed-to-chair/chair-to-bed  -CD     [place] Level/Cues Needed (Transfer Goal 1, PT) contact guard required  NWB L LE  -CD     Time Frame (Transfer Goal 1, PT) short term goal (STG);1 week  -CD       Row Name 06/21/25 1611          Gait Training Goal 1 (PT)    Activity/Assistive Device (Gait Training Goal 1, PT) gait (walking locomotion);walker, rolling  ROLLING KNEE WALKER  -CD     [place] Level (Gait Training Goal 1, PT) contact guard required  -CD     Distance (Gait Training Goal 1, PT) 100 FEET  -CD     Time Frame (Gait Training Goal 1, PT) long term goal (LTG);2 weeks  -CD       Row Name 06/21/25 1611          Problem Specific Goal 1 (PT)    Problem Specific Goal 1 (PT) PT TO INDEPENDENTLY PROPEL W/C X 200 FEET WITH R LE AND B LE'S  -CD       Row Name 06/21/25 1611          Therapy Assessment/Plan (PT)    Planned Therapy Interventions (PT) balance training;gait training;home exercise program;transfer training;strengthening;wheelchair management/propulsion training  -CD               User Key  (r) = Recorded By, (t) = Taken By, (c) = Cosigned By      Initials Name Provider Type    CD Romelia Rodríguez PT Physical Therapist                    Clinical Impression       Row Name 06/21/25 1601          Pain    Pretreatment Pain Rating 2/10  -CD     Posttreatment Pain Rating 1/10  -CD     Pain Location ankle  -CD     Pain Side/Orientation left  -CD     Pain Management Interventions positioning techniques utilized;movement retraining implemented;exercise or physical activity utilized  -CD     Response to Pain Interventions activity participation with tolerable pain  -CD       Row Name 06/21/25 1601          Plan of Care Review    Plan of Care Reviewed With patient  -CD     Outcome Evaluation PT IS  S/P L ANKLE I&D AND REMOVAL OF HARDWARE AND PRESENTS WITH EVOLVING SYMPTOMS TO INCLUDE IMPAIRED BALANCE, GENERALIZED WEAKNESS, DECREASED ENDURANCE AND POOR SAFTEY AWARENESS. PT NEEDS MAX CUES TO MAINTAIN NWB STATUS L LE DURING TRANSFERS AND GAIT SHORT DISTANCE  AND IS LIMITED BY FATIGUE. PT WAS ADAMANT TO BE D/C'D HOME AFTER INITIAL ANKLE SURGERY ON 5/9/25 BUT ADMITS THAT HE DID NOT MAINTAIN STRICT NWB STATUS L LE ONCE HOME. WILL CONSIDER TRIAL OF KNEE WALKER NEXT SESSION BUT WILL LIKELY BE MORE APPROPRIATE TO FOCUS ON TRANSFERS TO W/C FOR MOBILITY. RECOMMEND IRF AT D/C FOR BEST OUTCOME.  -CD       Row Name 06/21/25 1601          Therapy Assessment/Plan (PT)    Patient/Family Therapy Goals Statement (PT) TO GO HOME.  -CD     Rehab Potential (PT) fair  -CD     Criteria for Skilled Interventions Met (PT) yes;meets criteria;skilled treatment is necessary  -CD     Therapy Frequency (PT) daily  -CD     Predicted Duration of Therapy Intervention (PT) 2 WEEKS  -CD       Row Name 06/21/25 1601          Vital Signs    Pre Systolic BP Rehab --  NSG CLEARED FOR P.T.  -CD     O2 Delivery Pre Treatment room air  -CD     O2 Delivery Intra Treatment room air  -CD     O2 Delivery Post Treatment room air  -CD     Pre Patient Position Supine  -CD     Intra Patient Position Standing  -CD     Post Patient Position Sitting  -CD       Row Name 06/21/25 1601          Positioning and  Restraints    Pre-Treatment Position in bed  -CD     Post Treatment Position chair  -CD     In Chair reclined;call light within reach;encouraged to call for assist;exit alarm on;legs elevated;notified nsg  IN CAM BOOT. NSG NOTIFIED OF CURRENT MOBILITY STATUS AND REC'S FOR ASSIST WITH TRANSFERS, NWB L LE IN BOOT.  -CD               User Key  (r) = Recorded By, (t) = Taken By, (c) = Cosigned By      Initials Name Provider Type    CD Romelia Rodríguez, PT Physical Therapist                   Outcome Measures       Row Name 06/21/25 1614          How much help from another person do you currently need...    Turning from your back to your side while in flat bed without using bedrails? 4  -CD     Moving from lying on back to sitting on the side of a flat bed without bedrails? 4  -CD     Moving to and from a bed to a chair (including a wheelchair)? 2  -CD     Standing up from a chair using your arms (e.g., wheelchair, bedside chair)? 2  -CD     Climbing 3-5 steps with a railing? 1  -CD     To walk in hospital room? 2  -CD     AM-PAC 6 Clicks Score (PT) 15  -CD       Row Name 06/21/25 1549          Functional Assessment    Outcome Measure Options AM-PAC 6 Clicks Daily Activity (OT)  -LR               User Key  (r) = Recorded By, (t) = Taken By, (c) = Cosigned By      Initials Name Provider Type    CD Romelia Rodríguez, PT Physical Therapist    LR Bekah Hernandez, OT Occupational Therapist                                 Physical Therapy Education       Title: PT OT SLP Therapies (In Progress)       Topic: Physical Therapy (Done)       Point: Mobility training (Done)       Learning Progress Summary            Patient Acceptance, E, VU,NR by CD at 6/21/2025 1614    Comment: BENEFITS OF OOB ACTIVITY, SAFETY WITH MOBILITY, PROGRESSION OF POC, D/C PLANNING, GAIT TRAINING/TRANSFER TRAINING, NWB STATUS L LE.                      Point: Home exercise program (Done)       Learning Progress Summary            Patient Acceptance, E, VU,NR  by CD at 6/21/2025 1614    Comment: BENEFITS OF OOB ACTIVITY, SAFETY WITH MOBILITY, PROGRESSION OF POC, D/C PLANNING, GAIT TRAINING/TRANSFER TRAINING, NWB STATUS L LE.                      Point: Body mechanics (Done)       Learning Progress Summary            Patient Acceptance, E, VU,NR by CD at 6/21/2025 1614    Comment: BENEFITS OF OOB ACTIVITY, SAFETY WITH MOBILITY, PROGRESSION OF POC, D/C PLANNING, GAIT TRAINING/TRANSFER TRAINING, NWB STATUS L LE.                      Point: Precautions (Done)       Learning Progress Summary            Patient Acceptance, E, VU,NR by CD at 6/21/2025 1614    Comment: BENEFITS OF OOB ACTIVITY, SAFETY WITH MOBILITY, PROGRESSION OF POC, D/C PLANNING, GAIT TRAINING/TRANSFER TRAINING, NWB STATUS L LE.                                      User Key       Initials Effective Dates Name Provider Type Discipline    CD 02/03/23 -  Romelia Rodríguez, PT Physical Therapist PT                  PT Recommendation and Plan  Planned Therapy Interventions (PT): balance training, gait training, home exercise program, transfer training, strengthening, wheelchair management/propulsion training  Outcome Evaluation: PT IS  S/P L ANKLE I&D AND REMOVAL OF HARDWARE AND PRESENTS WITH EVOLVING SYMPTOMS TO INCLUDE IMPAIRED BALANCE, GENERALIZED WEAKNESS, DECREASED ENDURANCE AND POOR SAFTEY AWARENESS. PT NEEDS MAX CUES TO MAINTAIN NWB STATUS L LE DURING TRANSFERS AND GAIT SHORT DISTANCE  AND IS LIMITED BY FATIGUE. PT WAS ADAMANT TO BE D/C'D HOME AFTER INITIAL ANKLE SURGERY ON 5/9/25 BUT ADMITS THAT HE DID NOT MAINTAIN STRICT NWB STATUS L LE ONCE HOME. WILL CONSIDER TRIAL OF KNEE WALKER NEXT SESSION BUT WILL LIKELY BE MORE APPROPRIATE TO FOCUS ON TRANSFERS TO W/C FOR MOBILITY. RECOMMEND IRF AT D/C FOR BEST OUTCOME.     Time Calculation:   PT Evaluation Complexity  History, PT Evaluation Complexity: 3 or more personal factors and/or comorbidities  Examination of Body Systems (PT Eval Complexity): total of 4 or more  elements  Clinical Presentation (PT Evaluation Complexity): evolving  Clinical Decision Making (PT Evaluation Complexity): moderate complexity  Overall Complexity (PT Evaluation Complexity): moderate complexity     PT Charges       Row Name 06/21/25 1619             Time Calculation    Start Time 1456  -CD      PT Received On 06/21/25  -CD      PT Goal Re-Cert Due Date 07/01/25  -CD         Time Calculation- PT    Total Timed Code Minutes- PT 8 minute(s)  -CD         Timed Charges    06130 - Gait Training Minutes  8  -CD         Untimed Charges    PT Eval/Re-eval Minutes 56  -CD         Total Minutes    Timed Charges Total Minutes 8  -CD      Untimed Charges Total Minutes 56  -CD       Total Minutes 64  -CD                User Key  (r) = Recorded By, (t) = Taken By, (c) = Cosigned By      Initials Name Provider Type    CD Romelia Rodríguez, PT Physical Therapist                  Therapy Charges for Today       Code Description Service Date Service Provider Modifiers Qty    61129233558 HC GAIT TRAINING EA 15 MIN 6/21/2025 Romelia Rodríguez, PT GP 1    33043017302 HC PT EVAL MOD COMPLEXITY 4 6/21/2025 Romelia Rodríguez, PT GP 1            PT G-Codes  Outcome Measure Options: AM-PAC 6 Clicks Daily Activity (OT)  AM-PAC 6 Clicks Score (PT): 15  AM-PAC 6 Clicks Score (OT): 16  PT Discharge Summary  Anticipated Discharge Disposition (PT): inpatient rehabilitation facility (CURRENTLY PT REFUSES. WANTS TO GO HOME WITH WIFE.)    Romelia Rodríguez, PT  6/21/2025

## 2025-06-21 NOTE — PLAN OF CARE
Goal Outcome Evaluation:  Plan of Care Reviewed With: patient           Outcome Evaluation: PT IS  S/P L ANKLE I&D AND REMOVAL OF HARDWARE AND PRESENTS WITH EVOLVING SYMPTOMS TO INCLUDE IMPAIRED BALANCE, GENERALIZED WEAKNESS, DECREASED ENDURANCE AND POOR SAFTEY AWARENESS. PT NEEDS MAX CUES TO MAINTAIN NWB STATUS L LE DURING TRANSFERS AND GAIT SHORT DISTANCE  AND IS LIMITED BY FATIGUE. PT WAS ADAMANT TO BE D/C'D HOME AFTER INITIAL ANKLE SURGERY ON 5/9/25 BUT ADMITS THAT HE DID NOT MAINTAIN STRICT NWB STATUS L LE ONCE HOME. WILL CONSIDER TRIAL OF KNEE WALKER NEXT SESSION BUT WILL LIKELY BE MORE APPROPRIATE TO FOCUS ON TRANSFERS TO W/C FOR MOBILITY. RECOMMEND IRF AT D/C FOR BEST OUTCOME.    Anticipated Discharge Disposition (PT): inpatient rehabilitation facility (CURRENTLY PT REFUSES. WANTS TO GO HOME WITH WIFE.)

## 2025-06-21 NOTE — THERAPY EVALUATION
Patient Name: Ernesto Vann  : 1945    MRN: [id number]                              Today's Date: 2025       Admit Date: 2025    Visit Dx:     ICD-10-CM ICD-9-CM   1. Infection of bone of left ankle  M86.9 730.97     Patient Active Problem List   Diagnosis    Chronic obstructive pulmonary disease    Essential hypertension    Alcohol abuse    History of tobacco abuse    DDD (degenerative disc disease), lumbar    Generalized anxiety disorder    Hyperlipidemia    PVD (peripheral vascular disease)    Neuropathy    Snoring    Dizziness    Fatigue    Weight loss    Hyponatremia    Prostate cancer    S/P prostatectomy, robotic assisted, radical with LNs    Raynaud's phenomenon without gangrene    Critical polytrauma    Hypokalemia    Orbital floor fracture    SAH (subarachnoid hemorrhage)    Seasonal allergies    Subdural hematoma    Syncope    Blurred vision    Irritable bowel syndrome with both constipation and diarrhea    Major depressive disorder, recurrent episode, moderate    Closed fracture of left ankle    Ankle fracture    S/P ORIF (open reduction internal fixation) fracture, left ankle, ORIF Synesmosis    Malignant neoplasm of prostate    Surgery follow-up    Surgical site infection    Postoperative wound infection, left ankle, status post incision and debridement, hardware removal, antibiotic matrix placement     Past Medical History:   Diagnosis Date    Alcohol abuse     Allergic     Anxiety     Arthritis     Benign colon polyp     Cancer prostate    PROSTATE CANCER    Cataract     Chronic obstructive pulmonary disease 2016    COPD (chronic obstructive pulmonary disease)     DDD (degenerative disc disease), lumbar     Depression     Diabetes mellitus     Dyslipidemia     Elevated PSA 2018    GERD (gastroesophageal reflux disease)     Head injury     Hyperlipidemia     Hypertension     Inguinal hernia     Irritable bowel syndrome with both constipation and diarrhea 05/15/2024     "PAD (peripheral artery disease)     Peripheral neuropathy     Prostate cancer     SAH (subarachnoid hemorrhage) 06/30/2021    Tobacco abuse      Past Surgical History:   Procedure Laterality Date    ANKLE OPEN REDUCTION INTERNAL FIXATION Left 05/09/2025    Procedure: OPEN REDUCTION INTERNAL FIXATION OF LEFT ANKLE, SYNDESMOTIC FIXATION;  Surgeon: Oliver Trejo MD;  Location:  [facility];  Service: Orthopedics;  Laterality: Left;    BELPHAROPTOSIS REPAIR Bilateral 09/01/2019    COLONOSCOPY      EYE SURGERY  Cataract    cataract    FEMORAL ARTERY - FEMORAL ARTERY BYPASS GRAFT      FEMORAL ARTERY STENT      INGUINAL HERNIA REPAIR Right     LUMBAR SYMPATHETIC NERVE BLOCK      PROSTATECTOMY N/A 11/22/2016    Procedure: PROSTATECTOMY LAPAROSCOPIC WITH QiroINCI ROBOT WITH NODES;  Surgeon: Victorino Emerson Jr., MD;  Location:  [facility];  Service:     TONSILLECTOMY      TONSILLECTOMY AND ADENOIDECTOMY      VASCULAR SURGERY      VASECTOMY      VEIN SURGERY        General Information       Row Name 06/21/25 1549          OT Time and Intention    Document Type evaluation  -LR     Mode of Treatment occupational therapy  -LR       Row Name 06/21/25 1549          General Information    Patient Profile Reviewed yes  -LR     Prior Level of Function independent:;gait;transfer;bed mobility;ADL's  PRIOR TO ANKLE SURGERY 5/9/25. PT WAS ADAMANT TO BE DISCHARGED HOME ON 5/10/25 BUT ENDORSES THAT HE DID NOT MAINTAIN STRICT NWB STATUS L LE ONCE HOME. PT HAS R WALKER, W/C AND SHOWER CHAIR.  -LR     Existing Precautions/Restrictions fall;non-weight bearing;left;other (see comments)  NWB L LE IN CAM BOOT. PT IS S/P I&D L ANKLE WOUND AND REMOVAL OF ANKLE HARDWARE.  -LR     Barriers to Rehab medically complex;previous functional deficit;ineffective coping  WHEN REC FOR IRF MENTIONED, PT REPORTS HE WOULD HAVE A \"NERVOUS BREAKDOWN\" IF COULD NOT GO HOME. PT WAS ADAMANT TO GO HOME AT LAST ADMIT BUT DID NOT MAINTAIN NWB STATUS.  -LR       Row Name " 06/21/25 1549          Living Environment    Current Living Arrangements home  -LR     People in Home spouse  -LR       Row Name 06/21/25 1549          Home Main Entrance    Number of Stairs, Main Entrance none  -LR       Row Name 06/21/25 1549          Stairs Within Home, Primary    Number of Stairs, Within Home, Primary none  -LR       Row Name 06/21/25 1549          Cognition    Orientation Status (Cognition) oriented x 3  -LR       Row Name 06/21/25 1549          Safety Issues/Impairments Affecting Functional Mobility    Safety Issues Affecting Function (Mobility) safety precautions follow-through/compliance;awareness of need for assistance;safety precaution awareness;ability to follow commands;problem-solving;sequencing abilities;insight into deficits/self-awareness;judgment  -LR     Impairments Affecting Function (Mobility) balance;coordination;endurance/activity tolerance;strength;pain;postural/trunk control;other (see comments)  pt needing increased encouragement and VC  -LR               User Key  (r) = Recorded By, (t) = Taken By, (c) = Cosigned By      Initials Name Provider Type    LR Bekah Hernandez, OT Occupational Therapist                     Mobility/ADL's       Row Name 06/21/25 1551          Bed Mobility    Bed Mobility supine-sit  -LR     Supine-Sit [place] (Bed Mobility) standby assist;verbal cues  -LR     Assistive Device (Bed Mobility) bed rails;head of bed elevated  -LR       Row Name 06/21/25 155          Transfers    Transfers sit-stand transfer;bed-chair transfer  -LR       Row Name 06/21/25 1559          Bed-Chair Transfer    Bed-Chair [place] (Transfers) moderate assist (50% patient effort);2 person assist  -LR     Assistive Device (Bed-Chair Transfers) walker, front-wheeled  -LR     Comment, (Bed-Chair Transfer) VC for safety awareness and to maintain NWB precautions  -LR       Row Name 06/21/25 1550          Sit-Stand Transfer    Sit-Stand [place] (Transfers) minimum  assist (75% patient effort)  -LR     Assistive Device (Sit-Stand Transfers) walker, front-wheeled  -LR       Row Name 06/21/25 1554          Functional Mobility    Functional Mobility- Ind. Level minimum assist (75% patient effort);1 person + 1 person to manage equipment;verbal cues required;other (see comments)  chair follow  -LR     Functional Mobility- Device walker, front-wheeled  -LR     Functional Mobility-Distance (Feet) --  <HH distance  -LR     Patient was able to Ambulate yes  -LR       Row Name 06/21/25 1554          Activities of Daily Living    BADL Assessment/Intervention upper body dressing;grooming  -LR       Row Name 06/21/25 1554          Upper Body Dressing Assessment/Training    [place] Level (Upper Body Dressing) don;front opening garment;minimum assist (75% patient effort)  -LR     Position (Upper Body Dressing) edge of bed sitting  -LR       Row Name 06/21/25 1554          Grooming Assessment/Training    [place] Level (Grooming) wash face, hands;set up  -LR     Position (Grooming) supported sitting  -LR               User Key  (r) = Recorded By, (t) = Taken By, (c) = Cosigned By      Initials Name Provider Type    LR Bekah Hernandez, OT Occupational Therapist                   Obj/Interventions       Row Name 06/21/25 1601          Sensory Assessment (Somatosensory)    Sensory Assessment (Somatosensory) UE sensation intact  -       Row Name 06/21/25 1601          Vision Assessment/Intervention    Visual Impairment/Limitations WFL  -LR       Row Name 06/21/25 1601          Range of Motion Comprehensive    General Range of Motion bilateral upper extremity ROM WFL  -       Row Name 06/21/25 1601          Strength Comprehensive (MMT)    General Manual Muscle Testing (MMT) Assessment upper extremity strength deficits identified  -LR     Comment, General Manual Muscle Testing (MMT) Assessment BUE grossly 4-/5 MMT  -LR       Row Name 06/21/25 1601          Balance    Balance  Assessment sitting static balance;sitting dynamic balance;standing static balance;standing dynamic balance  -LR     Static Sitting Balance independent  -LR     Dynamic Sitting Balance standby assist  -LR     Position, Sitting Balance unsupported;sitting edge of bed  -LR     Static Standing Balance minimal assist  -LR     Dynamic Standing Balance moderate assist  -LR     Position/Device Used, Standing Balance supported;walker, front-wheeled  -LR     Balance Interventions sitting;standing;sit to stand;supported;static;dynamic;occupation based/functional task  -LR               User Key  (r) = Recorded By, (t) = Taken By, (c) = Cosigned By      Initials Name Provider Type    LR Bekah Hernandez OT Occupational Therapist                   Goals/Plan       Row Name 06/21/25 1547          Transfer Goal 1 (OT)    Activity/Assistive Device (Transfer Goal 1, OT) sit-to-stand/stand-to-sit;bed-to-chair/chair-to-bed  -LR     [place] Level/Cues Needed (Transfer Goal 1, OT) contact guard required  -LR     Time Frame (Transfer Goal 1, OT) long term goal (LTG);10 days  -LR     Progress/Outcome (Transfer Goal 1, OT) goal ongoing;new goal  -LR       Row Name 06/21/25 1540          Toileting Goal 1 (OT)    Activity/Device (Toileting Goal 1, OT) perform perineal hygiene;adjust/manage clothing;commode, bedside without drop arms  -LR     [place] Level/Cues Needed (Toileting Goal 1, OT) standby assist  -LR     Time Frame (Toileting Goal 1, OT) long term goal (LTG);1 week  -LR     Progress/Outcome (Toileting Goal 1, OT) new goal;goal ongoing  -LR       Row Name 06/21/25 1548          Grooming Goal 1 (OT)    Activity/Device (Grooming Goal 1, OT) hair care;oral care;wash face, hands  -LR     [place] (Grooming Goal 1, OT) standby assist  -LR     Time Frame (Grooming Goal 1, OT) short term goal (STG);4 days  -LR     Strategies/Barriers (Grooming Goal 1, OT) maintaining NWB precautions  -LR     Progress/Outcome (Grooming Goal  1, OT) goal ongoing;new goal  -LR       Row Name 06/21/25 1207          Therapy Assessment/Plan (OT)    Planned Therapy Interventions (OT) activity tolerance training;adaptive equipment training;occupation/activity based interventions;strengthening exercise;functional balance retraining;IADL retraining;passive ROM/stretching;transfer/mobility retraining;ROM/therapeutic exercise;patient/caregiver education/training;BADL retraining  -LR               User Key  (r) = Recorded By, (t) = Taken By, (c) = Cosigned By      Initials Name Provider Type    LR Bekah Hernandez, OT Occupational Therapist                   Clinical Impression       Row Name 06/21/25 4558          Pain Assessment    Pretreatment Pain Rating 2/10  -LR     Posttreatment Pain Rating 2/10  -LR     Pain Location ankle  -LR     Pain Side/Orientation left  -LR     Pain Management Interventions activity modification encouraged;exercise or physical activity utilized  -LR     Response to Pain Interventions activity level improved;activity participation with tolerable pain  -LR       Row Name 06/21/25 9509          Plan of Care Review    Plan of Care Reviewed With patient  -LR     Progress no change  -LR     Outcome Evaluation OT eval complete. Pt presents below baseline with limited endurance, decreased safety awareness, ADL performance, and balance deficits. Pt needing VC at beginning of session as reminders for NWB status. SBA for bed mobility. Pt stating that he did not maintain NWB status at home. Recommend IPOT POC and IRF at D/C.  -LR       Row Name 06/21/25 1977          Therapy Assessment/Plan (OT)    Patient/Family Therapy Goal Statement (OT) PLOF  -LR     Rehab Potential (OT) good  -LR     Criteria for Skilled Therapeutic Interventions Met (OT) yes;skilled treatment is necessary  -LR     Therapy Frequency (OT) daily  -LR     Predicted Duration of Therapy Intervention (OT) 10 days  -LR       Row Name 06/21/25 6907          Therapy Plan  Review/Discharge Plan (OT)    Anticipated Discharge Disposition (OT) inpatient rehabilitation facility  -LR       Row Name 06/21/25 1555          Vital Signs    O2 Delivery Pre Treatment room air  -LR     O2 Delivery Intra Treatment room air  -LR     O2 Delivery Post Treatment room air  -LR     Pre Patient Position Supine  -LR     Intra Patient Position Standing  -LR     Post Patient Position Sitting  -LR       Row Name 06/21/25 1553          Positioning and Restraints    Pre-Treatment Position in bed  -LR     Post Treatment Position chair  -LR     In Chair reclined;waffle cushion;legs elevated;with PT  -LR               User Key  (r) = Recorded By, (t) = Taken By, (c) = Cosigned By      Initials Name Provider Type    Bekah Kay, MARIBELL Occupational Therapist                   Outcome Measures       Row Name 06/21/25 2885          How much help from another is currently needed...    Putting on and taking off regular lower body clothing? 2  -LR     Bathing (including washing, rinsing, and drying) 2  -LR     Toileting (which includes using toilet bed pan or urinal) 2  -LR     Putting on and taking off regular upper body clothing 3  -LR     Taking care of personal grooming (such as brushing teeth) 3  -LR     Eating meals 4  -LR     AM-PAC 6 Clicks Score (OT) 16  -LR       Row Name 06/21/25 1544          Functional Assessment    Outcome Measure Options AM-PAC 6 Clicks Daily Activity (OT)  -LR               User Key  (r) = Recorded By, (t) = Taken By, (c) = Cosigned By      Initials Name Provider Type    Bekah Kay OT Occupational Therapist                      OT Recommendation and Plan  Planned Therapy Interventions (OT): activity tolerance training, adaptive equipment training, occupation/activity based interventions, strengthening exercise, functional balance retraining, IADL retraining, passive ROM/stretching, transfer/mobility retraining, ROM/therapeutic exercise, patient/caregiver  education/training, BADL retraining  Therapy Frequency (OT): daily  Plan of Care Review  Plan of Care Reviewed With: patient  Progress: no change  Outcome Evaluation: OT eval complete. Pt presents below baseline with limited endurance, decreased safety awareness, ADL performance, and balance deficits. Pt needing VC at beginning of session as reminders for NWB status. SBA for bed mobility. Pt stating that he did not maintain NWB status at home. Recommend IPOT POC and IRF at D/C.     Time Calculation:   Evaluation Complexity (OT)  Review Occupational Profile/Medical/Therapy History Complexity: brief/low complexity  Assessment, Occupational Performance/Identification of Deficit Complexity: 1-3 performance deficits  Clinical Decision Making Complexity (OT): problem focused assessment/low complexity  Overall Complexity of Evaluation (OT): low complexity     Time Calculation- OT       Row Name 06/21/25 1600             Time Calculation- OT    OT Start Time 1440  -LR      OT Received On 06/21/25  -LR      OT Goal Re-Cert Due Date 07/01/25  -LR         Untimed Charges    OT Eval/Re-eval Minutes 46  -LR         Total Minutes    Untimed Charges Total Minutes 46  -LR       Total Minutes 46  -LR                User Key  (r) = Recorded By, (t) = Taken By, (c) = Cosigned By      Initials Name Provider Type    LR Bekah Hernandez OT Occupational Therapist                  Therapy Charges for Today       Code Description Service Date Service Provider Modifiers Qty    59457063317 HC OT EVAL LOW COMPLEXITY 4 6/21/2025 Bekah Hernandez OT GO 1                 Bekah Hernandez OT  6/21/2025

## 2025-06-21 NOTE — H&P
Patient Name: Ernesto Vann  MRN: [id number]  : 1945  DOS: 2025    Attending: Oliver Trejo MD    Primary Care Provider: Mali Galeana MD      Chief complaint: Postoperative wound infection    Subjective   Patient is a pleasant 79 y.o. male presented to Dr. Trejo's office for a follow-up postoperatively and was found to have dehisced wound with exposed hardware.    Patient is known to us from his hospitalization in May of this year when he had ORIF of his left ankle fracture.  He had a splint on since then and that was removed at the office by Dr. Trejo prior to patient being sent for surgery and admission.    He has no nausea or vomiting, no shortness of breath, no fever or chills.    He underwent incision and debridement with hardware removal and antibiotic matrix placement.  Tolerated surgery well and was admitted for further management.    No recent medical issues since his discharge on 5/10/2025     Allergies   Allergen Reactions    Diclofenac GI Bleeding and Unknown (See Comments)    Tofranil [Imipramine Hcl] Other (See Comments)     Syncope, falls    Lisinopril Cough       Meds:  Medications Prior to Admission   Medication Sig Dispense Refill Last Dose/Taking    clopidogrel (PLAVIX) 75 MG tablet TAKE 1 TABLET BY MOUTH ONCE DAILY 90 tablet 1 2025 at  6:30 AM    metoprolol succinate XL (TOPROL-XL) 100 MG 24 hr tablet Take 2 tablets by mouth Daily. 180 tablet 1 2025 at  6:30 AM    acetaminophen (TYLENOL) 500 MG tablet Take 1 tablet by mouth Every 6 (Six) Hours As Needed for Mild Pain. 30 tablet 0     amLODIPine (NORVASC) 10 MG tablet Take 1 tablet by mouth Daily. 90 tablet 1     atorvastatin (LIPITOR) 20 MG tablet Take 1 tablet by mouth Daily. 90 tablet 1     gabapentin (NEURONTIN) 100 MG capsule Take 1 capsule by mouth Every 12 (Twelve) Hours.       oxyCODONE (Roxicodone) 5 MG immediate release tablet Take 1 tablet by mouth Every 4 (Four) Hours As Needed (pain). 40 tablet 0      QUEtiapine (SEROquel) 50 MG tablet Take 1 tablet by mouth Every Night. 90 tablet 1     sertraline (Zoloft) 100 MG tablet Take one 50 mg tablet by mouth daily for two weeks, then take one 100 mg tablet by mouth daily for two weeks, after four weeks, increase to one 50 mg tablet by mouth daily along with one 100 mg tablet by mouth daily to equal 150 mg by mouth daily 120 tablet 0     sertraline (Zoloft) 50 MG tablet Take one 50 mg tablet by mouth daily for two weeks, then take one 100 mg tablet by mouth daily for two weeks, after four weeks, increase to one 50 mg tablet by mouth daily along with one 100 mg tablet by mouth daily to equal 150 mg by mouth daily 120 tablet 0     solifenacin (VESICARE) 10 MG tablet Take 1 tablet by mouth Daily.            History:   Past Medical History:   Diagnosis Date    Alcohol abuse     Allergic     Anxiety     Arthritis     Benign colon polyp     Cancer prostate    PROSTATE CANCER    Cataract     Chronic obstructive pulmonary disease 06/17/2016    COPD (chronic obstructive pulmonary disease)     DDD (degenerative disc disease), lumbar     Depression     Diabetes mellitus     Dyslipidemia     Elevated PSA 12/2018    GERD (gastroesophageal reflux disease)     Head injury     Hyperlipidemia     Hypertension     Inguinal hernia     Irritable bowel syndrome with both constipation and diarrhea 05/15/2024    PAD (peripheral artery disease)     Peripheral neuropathy     Prostate cancer     SAH (subarachnoid hemorrhage) 06/30/2021    Tobacco abuse      Past Surgical History:   Procedure Laterality Date    ANKLE OPEN REDUCTION INTERNAL FIXATION Left 05/09/2025    Procedure: OPEN REDUCTION INTERNAL FIXATION OF LEFT ANKLE, SYNDESMOTIC FIXATION;  Surgeon: Oliver Trejo MD;  Location: [facility];  Service: Orthopedics;  Laterality: Left;    BELPHAROPTOSIS REPAIR Bilateral 09/01/2019    COLONOSCOPY      EYE SURGERY  Cataract    cataract    FEMORAL ARTERY - FEMORAL ARTERY BYPASS GRAFT      FEMORAL  "ARTERY STENT      INGUINAL HERNIA REPAIR Right     LUMBAR SYMPATHETIC NERVE BLOCK      PROSTATECTOMY N/A 2016    Procedure: PROSTATECTOMY LAPAROSCOPIC WITH DAVINCI ROBOT WITH NODES;  Surgeon: Victorino Emerson Jr., MD;  Location: [facility];  Service:     TONSILLECTOMY      TONSILLECTOMY AND ADENOIDECTOMY      VASCULAR SURGERY      VASECTOMY      VEIN SURGERY       Family History   Problem Relation Age of Onset    Cancer Mother     Hypertension Mother     Dementia Mother     Heart disease Father     Heart attack Father     Breast cancer Sister     Brain cancer Sister     Lung cancer Sister     Dementia Maternal Grandmother     Cancer Sister     Cancer Sister      Social History     Tobacco Use    Smoking status: Former     Current packs/day: 0.00     Average packs/day: 1 pack/day for 30.0 years (30.0 ttl pk-yrs)     Types: Cigarettes     Start date: 2000     Quit date: 2023     Years since quittin.9     Passive exposure: Past    Smokeless tobacco: Never    Tobacco comments:     HAS SMOKED 1PPD IN THE PAST   Vaping Use    Vaping status: Never Used   Substance Use Topics    Alcohol use: Not Currently     Alcohol/week: 30.0 - 35.0 standard drinks of alcohol     Types: 30 - 35 Glasses of wine per week     Comment: admits to 6 daily    Drug use: No       Review of Systems  Pertinent items are noted in HPI    Vital Signs  /84 (BP Location: Left arm, Patient Position: Lying)   Pulse 66   Temp 97.4 °F (36.3 °C) (Oral)   Resp 18   Ht 177.8 cm (70\")   Wt 69.4 kg (153 lb)   SpO2 97%   BMI 21.95 kg/m²     Physical Exam:    General Appearance:    Alert, cooperative, in no acute distress   Head:    Normocephalic, without obvious abnormality, atraumatic   Eyes:            Lids and lashes normal, conjunctivae and sclerae normal, no   icterus, no pallor, corneas clear,    Ears:    Ears appear intact with no abnormalities noted   Throat:   No oral lesions, no thrush, oral mucosa moist   Neck:   No " adenopathy, supple, trachea midline, no thyromegaly         Lungs:     Clear to auscultation,respirations regular, even and                   unlabored    Heart:    Regular rhythm and normal rate, normal S1 and S2, no            murmur, no gallop   Abdomen:     Normal bowel sounds, no masses, no organomegaly, soft,        nontender, nondistended, no guarding, no rebound                 tenderness   Genitalia:    Deferred   Extremities: Tall postop boot with dressing intact on left lower extremity.   Pulses:   Pulses palpable and equal bilaterally   Skin:   No bleeding, bruising or rash           I reviewed the patient's new clinical results.       Results from last 7 days   Lab Units 06/20/25  1400   HEMOGLOBIN, POC g/dL 13.9   HEMATOCRIT POC % 41     Results from last 7 days   Lab Units 06/20/25  1725   SODIUM mmol/L 125*   POTASSIUM mmol/L 3.9   CHLORIDE mmol/L 89*   CO2 mmol/L 23.0   BUN mg/dL 6.6*   CREATININE mg/dL 0.54*   CALCIUM mg/dL 8.8   GLUCOSE mg/dL 125*     Lab Results   Component Value Date    HGBA1C 5.10 10/22/2024           Assessment and Plan:       Postoperative wound infection, left ankle, status post incision and debridement, hardware removal, antibiotic matrix placement    Chronic obstructive pulmonary disease    Essential hypertension    Generalized anxiety disorder    Hyperlipidemia    S/P ORIF (open reduction internal fixation) fracture, left ankle, ORIF Synesmosis    Surgical site infection  Hyponatremia, nocturnal problem    Plan    1. PT/OT, nonweightbearing left lower extremity  2. Pain control-prns . multimodal approach   3. IS-encourage  4. DVT proph- mechanicals antiplatelets resumption timing per Dr. Trejo   5. Bowel regimen  6. Resume home medications as appropriate  7. Monitor post-op labs  8. Discharge planning        - Hypertension, hyperlipidemia, peripheral vascular disease: Resume home regimen of antihypertensive medications with holding parameters, statin, and Plavix which  will likely resume postop day 2     -Anxiety: Resume Zoloft.     - Monitor operative cultures, antibiotics will be managed by infectious disease consultants.      Dragon disclaimer:  Part of this encounter note is an electronic transcription/translation of spoken language to printed text. The electronic translation of spoken language may permit erroneous, or at times, nonsensical words or phrases to be inadvertently transcribed; Although I have reviewed the note for such errors, some may still exist.    Sapna Aguilar MD  06/20/25  22:29 EDT

## 2025-06-21 NOTE — PROGRESS NOTES
"IM progress note      Ernesto Vann  [id number]  1945     LOS: 0 days     Attending: Oliver Trejo MD    Primary Care Provider: Mali Galeana MD      Chief Complaint/Reason for visit:  No chief complaint on file.      Subjective   Feels okay with good pain control.  Tolerating antibiotics.  No nausea, vomiting, or shortness of breath.  He tells me inpatient rehab has been recommended but he does not want to go there directly and he wants to go home for a few days.      Objective        Visit Vitals  /75 (BP Location: Left arm, Patient Position: Sitting)   Pulse 65   Temp 97.8 °F (36.6 °C) (Oral)   Resp 16   Ht 177.8 cm (70\")   Wt 69.4 kg (153 lb)   SpO2 96%   BMI 21.95 kg/m²     Temp (24hrs), Av.6 °F (36.4 °C), Min:97.4 °F (36.3 °C), Max:97.9 °F (36.6 °C)      Intake/Output:    Intake/Output Summary (Last 24 hours) at 2025 1555  Last data filed at 2025 1120  Gross per 24 hour   Intake 700 ml   Output 2600 ml   Net -1900 ml        Physical Therapy:    Date of Service: 25 1456  Creation Time: 25 1617     Signed         Goal Outcome Evaluation:  Plan of Care Reviewed With: patient  Outcome Evaluation: PT IS  S/P L ANKLE I&D AND REMOVAL OF HARDWARE AND PRESENTS WITH EVOLVING SYMPTOMS TO INCLUDE IMPAIRED BALANCE, GENERALIZED WEAKNESS, DECREASED ENDURANCE AND POOR SAFTEY AWARENESS. PT NEEDS MAX CUES TO MAINTAIN NWB STATUS L LE DURING TRANSFERS AND GAIT SHORT DISTANCE  AND IS LIMITED BY FATIGUE. PT WAS ADAMANT TO BE D/C'D HOME AFTER INITIAL ANKLE SURGERY ON 25 BUT ADMITS THAT HE DID NOT MAINTAIN STRICT NWB STATUS L LE ONCE HOME. WILL CONSIDER TRIAL OF KNEE WALKER NEXT SESSION BUT WILL LIKELY BE MORE APPROPRIATE TO FOCUS ON TRANSFERS TO W/C FOR MOBILITY. RECOMMEND IRF AT D/C FOR BEST OUTCOME.     Anticipated Discharge Disposition (PT): inpatient rehabilitation facility (CURRENTLY PT REFUSES. WANTS TO GO HOME WITH WIFE.)                 Physical Exam:     General Appearance:   "  Alert, cooperative, in no acute distress   Head:    Normocephalic, without obvious abnormality, atraumatic    Lungs:     Normal effort, symmetric chest rise,  clear to      auscultation bilaterally              Heart:    Regular rhythm and normal rate, normal S1 and S2    Abdomen:     Normal bowel sounds, no masses, no organomegaly, soft        nontender, nondistended, no guarding, no rebound                tenderness   Extremities: Tall boot on left lower extremity with intact dressing.  Able to wiggle toes.     Pulses:   Pulses palpable and equal bilaterally   Skin:   No bleeding, bruising or rash          Results Review:     I reviewed the patient's new clinical results.   Results from last 7 days   Lab Units 06/21/25  0720 06/20/25  1400   WBC 10*3/mm3 6.98  --    HEMOGLOBIN g/dL 11.8*  --    HEMOGLOBIN, POC g/dL  --  13.9   HEMATOCRIT % 33.9*  --    HEMATOCRIT POC %  --  41   PLATELETS 10*3/mm3 320  --      Results from last 7 days   Lab Units 06/20/25  1725   SODIUM mmol/L 125*   POTASSIUM mmol/L 3.9   CHLORIDE mmol/L 89*   CO2 mmol/L 23.0   BUN mg/dL 6.6*   CREATININE mg/dL 0.54*   CALCIUM mg/dL 8.8   GLUCOSE mg/dL 125*     I reviewed the patient's new imaging including images and reports.    All medications reviewed.   amLODIPine, 10 mg, Oral, Daily  atorvastatin, 20 mg, Oral, Nightly  [Held by provider] clopidogrel, 75 mg, Oral, Daily  DAPTOmycin, 6 mg/kg (Adjusted), Intravenous, Q24H  gabapentin, 100 mg, Oral, Q12H  metoprolol succinate XL, 200 mg, Oral, Daily  oxybutynin XL, 10 mg, Oral, Daily  piperacillin-tazobactam, 4.5 g, Intravenous, Q8H  QUEtiapine, 50 mg, Oral, Nightly  sertraline, 100 mg, Oral, Daily  sertraline, 50 mg, Oral, Daily  sodium chloride, 10 mL, Intravenous, Q12H      acetaminophen, 650 mg, Q4H PRN   Or  acetaminophen, 650 mg, Q4H PRN  bisacodyl, 10 mg, Daily PRN  HYDROmorphone, 0.5 mg, Q2H PRN   And  naloxone, 0.1 mg, Q5 Min PRN  labetalol, 10 mg, Q4H PRN  melatonin, 5 mg, Nightly  PRN  oxyCODONE, 5 mg, Q4H PRN  polyethylene glycol, 17 g, PRN  senna-docusate sodium, 2 tablet, BID PRN  sodium chloride, 500 mL, TID PRN  sodium chloride, 10 mL, PRN  sodium chloride, 40 mL, PRN        Assessment & Plan       Postoperative wound infection, left ankle, status post incision and debridement, hardware removal, antibiotic matrix placement    Chronic obstructive pulmonary disease    Essential hypertension    Generalized anxiety disorder    Hyperlipidemia    S/P ORIF (open reduction internal fixation) fracture, left ankle, ORIF Synesmosis    Surgical site infection         Plan  1. PT/OT, nonweightbearing left lower extremity  2. Pain control-prns . multimodal approach   3. IS-encourage  4. DVT proph- mechanicals, Plavix likely restart tomorrow   5. Bowel regimen  6. Resume home medications as appropriate  7. Monitor post-op labs  8. Discharge planning, inpatient rehab is recommended, patient does not want to go home and wants to go home.        - Hypertension, hyperlipidemia, peripheral vascular disease: Resume home regimen of antihypertensive medications with holding parameters, statin, and Plavix as noted above     -Anxiety: Resumed Zoloft.     - Monitor operative cultures, antibiotics will be managed by infectious disease consultants.    Sapna Aguilar MD  06/21/25  15:55 EDT

## 2025-06-22 LAB
ANION GAP SERPL CALCULATED.3IONS-SCNC: 13 MMOL/L (ref 5–15)
BACTERIA SPEC AEROBE CULT: ABNORMAL
BUN SERPL-MCNC: 7.5 MG/DL (ref 8–23)
BUN/CREAT SERPL: 13.4 (ref 7–25)
CALCIUM SPEC-SCNC: 8.9 MG/DL (ref 8.6–10.5)
CHLORIDE SERPL-SCNC: 89 MMOL/L (ref 98–107)
CO2 SERPL-SCNC: 25 MMOL/L (ref 22–29)
CREAT SERPL-MCNC: 0.56 MG/DL (ref 0.76–1.27)
DEPRECATED RDW RBC AUTO: 50 FL (ref 37–54)
EGFRCR SERPLBLD CKD-EPI 2021: 100.9 ML/MIN/1.73
ERYTHROCYTE [DISTWIDTH] IN BLOOD BY AUTOMATED COUNT: 15.3 % (ref 12.3–15.4)
GLUCOSE SERPL-MCNC: 92 MG/DL (ref 65–99)
GRAM STN SPEC: ABNORMAL
GRAM STN SPEC: ABNORMAL
HCT VFR BLD AUTO: 34.4 % (ref 37.5–51)
HGB BLD-MCNC: 11.9 G/DL (ref 13–17.7)
MCH RBC QN AUTO: 30.6 PG (ref 26.6–33)
MCHC RBC AUTO-ENTMCNC: 34.6 G/DL (ref 31.5–35.7)
MCV RBC AUTO: 88.4 FL (ref 79–97)
PLATELET # BLD AUTO: 315 10*3/MM3 (ref 140–450)
PMV BLD AUTO: 8.1 FL (ref 6–12)
POTASSIUM SERPL-SCNC: 3.6 MMOL/L (ref 3.5–5.2)
RBC # BLD AUTO: 3.89 10*6/MM3 (ref 4.14–5.8)
SODIUM SERPL-SCNC: 127 MMOL/L (ref 136–145)
WBC NRBC COR # BLD AUTO: 5.8 10*3/MM3 (ref 3.4–10.8)

## 2025-06-22 PROCEDURE — A9270 NON-COVERED ITEM OR SERVICE: HCPCS | Performed by: INTERNAL MEDICINE

## 2025-06-22 PROCEDURE — A9270 NON-COVERED ITEM OR SERVICE: HCPCS | Performed by: ORTHOPAEDIC SURGERY

## 2025-06-22 PROCEDURE — 97530 THERAPEUTIC ACTIVITIES: CPT

## 2025-06-22 PROCEDURE — 99024 POSTOP FOLLOW-UP VISIT: CPT | Performed by: ORTHOPAEDIC SURGERY

## 2025-06-22 PROCEDURE — 63710000001 METOPROLOL SUCCINATE XL 100 MG TABLET SUSTAINED-RELEASE 24 HOUR: Performed by: ORTHOPAEDIC SURGERY

## 2025-06-22 PROCEDURE — 63710000001 ATORVASTATIN 20 MG TABLET: Performed by: ORTHOPAEDIC SURGERY

## 2025-06-22 PROCEDURE — 63710000001 SERTRALINE 100 MG TABLET: Performed by: ORTHOPAEDIC SURGERY

## 2025-06-22 PROCEDURE — 63710000001 ACETAMINOPHEN 325 MG TABLET: Performed by: ORTHOPAEDIC SURGERY

## 2025-06-22 PROCEDURE — 63710000001 SENNOSIDES-DOCUSATE 8.6-50 MG TABLET: Performed by: INTERNAL MEDICINE

## 2025-06-22 PROCEDURE — 63710000001 OXYBUTYNIN XL 10 MG TABLET SUSTAINED-RELEASE 24 HOUR: Performed by: ORTHOPAEDIC SURGERY

## 2025-06-22 PROCEDURE — 85027 COMPLETE CBC AUTOMATED: CPT | Performed by: INTERNAL MEDICINE

## 2025-06-22 PROCEDURE — 25010000002 PIPERACILLIN SOD-TAZOBACTAM PER 1 G: Performed by: NURSE PRACTITIONER

## 2025-06-22 PROCEDURE — 63710000001 SERTRALINE 50 MG TABLET: Performed by: ORTHOPAEDIC SURGERY

## 2025-06-22 PROCEDURE — 80048 BASIC METABOLIC PNL TOTAL CA: CPT | Performed by: INTERNAL MEDICINE

## 2025-06-22 PROCEDURE — 63710000001 BISACODYL 5 MG TABLET DELAYED-RELEASE: Performed by: INTERNAL MEDICINE

## 2025-06-22 PROCEDURE — 25010000002 DAPTOMYCIN PER 1 MG: Performed by: NURSE PRACTITIONER

## 2025-06-22 PROCEDURE — 63710000001 QUETIAPINE 25 MG TABLET: Performed by: ORTHOPAEDIC SURGERY

## 2025-06-22 PROCEDURE — 63710000001 GABAPENTIN 100 MG CAPSULE: Performed by: ORTHOPAEDIC SURGERY

## 2025-06-22 PROCEDURE — 63710000001 OXYCODONE 5 MG TABLET: Performed by: ORTHOPAEDIC SURGERY

## 2025-06-22 PROCEDURE — 63710000001 POLYETHYLENE GLYCOL 17 G PACK: Performed by: INTERNAL MEDICINE

## 2025-06-22 PROCEDURE — 63710000001 AMLODIPINE 10 MG TABLET: Performed by: ORTHOPAEDIC SURGERY

## 2025-06-22 PROCEDURE — 25010000002 CEFTRIAXONE PER 250 MG: Performed by: NURSE PRACTITIONER

## 2025-06-22 PROCEDURE — 63710000001 MELATONIN 5 MG TABLET: Performed by: ORTHOPAEDIC SURGERY

## 2025-06-22 RX ORDER — BISACODYL 10 MG
10 SUPPOSITORY, RECTAL RECTAL DAILY PRN
Status: DISCONTINUED | OUTPATIENT
Start: 2025-06-22 | End: 2025-06-23 | Stop reason: HOSPADM

## 2025-06-22 RX ORDER — AMOXICILLIN 250 MG
2 CAPSULE ORAL 2 TIMES DAILY
Status: DISCONTINUED | OUTPATIENT
Start: 2025-06-22 | End: 2025-06-23 | Stop reason: HOSPADM

## 2025-06-22 RX ORDER — POLYETHYLENE GLYCOL 3350 17 G/17G
17 POWDER, FOR SOLUTION ORAL DAILY PRN
Status: DISCONTINUED | OUTPATIENT
Start: 2025-06-22 | End: 2025-06-23 | Stop reason: HOSPADM

## 2025-06-22 RX ORDER — BISACODYL 5 MG/1
5 TABLET, DELAYED RELEASE ORAL DAILY PRN
Status: DISCONTINUED | OUTPATIENT
Start: 2025-06-22 | End: 2025-06-23 | Stop reason: HOSPADM

## 2025-06-22 RX ADMIN — BISACODYL 5 MG: 5 TABLET, COATED ORAL at 20:15

## 2025-06-22 RX ADMIN — DAPTOMYCIN 400 MG: 500 INJECTION, POWDER, LYOPHILIZED, FOR SOLUTION INTRAVENOUS at 12:49

## 2025-06-22 RX ADMIN — Medication 5 MG: at 20:15

## 2025-06-22 RX ADMIN — OXYCODONE 5 MG: 5 TABLET ORAL at 13:08

## 2025-06-22 RX ADMIN — ACETAMINOPHEN 650 MG: 325 TABLET ORAL at 09:01

## 2025-06-22 RX ADMIN — SERTRALINE 50 MG: 50 TABLET, FILM COATED ORAL at 09:01

## 2025-06-22 RX ADMIN — DOCUSATE SODIUM 50 MG AND SENNOSIDES 8.6 MG 2 TABLET: 8.6; 5 TABLET, FILM COATED ORAL at 12:49

## 2025-06-22 RX ADMIN — PIPERACILLIN AND TAZOBACTAM 4.5 G: 4; .5 INJECTION, POWDER, FOR SOLUTION INTRAVENOUS at 09:01

## 2025-06-22 RX ADMIN — SODIUM CHLORIDE 2000 MG: 900 INJECTION INTRAVENOUS at 17:06

## 2025-06-22 RX ADMIN — GABAPENTIN 100 MG: 100 CAPSULE ORAL at 09:01

## 2025-06-22 RX ADMIN — POLYETHYLENE GLYCOL 3350 17 G: 17 POWDER, FOR SOLUTION ORAL at 12:49

## 2025-06-22 RX ADMIN — AMLODIPINE BESYLATE 10 MG: 10 TABLET ORAL at 09:01

## 2025-06-22 RX ADMIN — OXYBUTYNIN CHLORIDE 10 MG: 10 TABLET, EXTENDED RELEASE ORAL at 09:01

## 2025-06-22 RX ADMIN — ATORVASTATIN CALCIUM 20 MG: 20 TABLET, FILM COATED ORAL at 20:15

## 2025-06-22 RX ADMIN — Medication 10 ML: at 20:15

## 2025-06-22 RX ADMIN — METOPROLOL SUCCINATE 200 MG: 100 TABLET, EXTENDED RELEASE ORAL at 09:01

## 2025-06-22 RX ADMIN — GABAPENTIN 100 MG: 100 CAPSULE ORAL at 20:15

## 2025-06-22 RX ADMIN — OXYCODONE 5 MG: 5 TABLET ORAL at 20:15

## 2025-06-22 RX ADMIN — QUETIAPINE FUMARATE 50 MG: 25 TABLET ORAL at 20:15

## 2025-06-22 RX ADMIN — SERTRALINE 100 MG: 100 TABLET, FILM COATED ORAL at 09:01

## 2025-06-22 RX ADMIN — Medication 10 ML: at 09:02

## 2025-06-22 RX ADMIN — ACETAMINOPHEN 650 MG: 325 TABLET ORAL at 01:47

## 2025-06-22 RX ADMIN — DOCUSATE SODIUM 50 MG AND SENNOSIDES 8.6 MG 2 TABLET: 8.6; 5 TABLET, FILM COATED ORAL at 20:15

## 2025-06-22 NOTE — PROGRESS NOTES
"IM progress note      Ernesto Vann  [id number]  1945     LOS: 0 days     Attending: Oliver Trejo MD    Primary Care Provider: Mali Galeana MD      Chief Complaint/Reason for visit:  No chief complaint on file.      Subjective   25: Feels okay with good pain control.  Tolerating antibiotics.  No nausea, vomiting, or shortness of breath.  He tells me inpatient rehab has been recommended but he does not want to go there directly and he wants to go home for a few days.    25: feels ok. No new complaints, except tall boot being very uncomfortable. Has it off when seen.    Objective        Visit Vitals  /81 (BP Location: Left arm, Patient Position: Lying)   Pulse 58   Temp 98.1 °F (36.7 °C) (Axillary)   Resp 16   Ht 177.8 cm (70\")   Wt 69.4 kg (153 lb)   SpO2 98%   BMI 21.95 kg/m²     Temp (24hrs), Av.9 °F (36.6 °C), Min:97.8 °F (36.6 °C), Max:98.1 °F (36.7 °C)      Intake/Output:    Intake/Output Summary (Last 24 hours) at 2025 1452  Last data filed at 2025 0859  Gross per 24 hour   Intake --   Output 2150 ml   Net -2150 ml        Physical Therapy:    Date of Service: 25 1456  Creation Time: 25 1617     Signed         Goal Outcome Evaluation:  Plan of Care Reviewed With: patient  Outcome Evaluation: PT IS  S/P L ANKLE I&D AND REMOVAL OF HARDWARE AND PRESENTS WITH EVOLVING SYMPTOMS TO INCLUDE IMPAIRED BALANCE, GENERALIZED WEAKNESS, DECREASED ENDURANCE AND POOR SAFTEY AWARENESS. PT NEEDS MAX CUES TO MAINTAIN NWB STATUS L LE DURING TRANSFERS AND GAIT SHORT DISTANCE  AND IS LIMITED BY FATIGUE. PT WAS ADAMANT TO BE D/C'D HOME AFTER INITIAL ANKLE SURGERY ON 25 BUT ADMITS THAT HE DID NOT MAINTAIN STRICT NWB STATUS L LE ONCE HOME. WILL CONSIDER TRIAL OF KNEE WALKER NEXT SESSION BUT WILL LIKELY BE MORE APPROPRIATE TO FOCUS ON TRANSFERS TO W/C FOR MOBILITY. RECOMMEND IRF AT D/C FOR BEST OUTCOME.     Anticipated Discharge Disposition (PT): inpatient rehabilitation " facility (CURRENTLY PT REFUSES. WANTS TO GO HOME WITH WIFE.)                 Physical Exam:     General Appearance:    Alert, cooperative, in no acute distress   Head:    Normocephalic, without obvious abnormality, atraumatic    Lungs:     Normal effort, symmetric chest rise,  clear to      auscultation bilaterally              Heart:    Regular rhythm and normal rate, normal S1 and S2    Abdomen:     Normal bowel sounds, no masses, no organomegaly, soft        nontender, nondistended, no guarding, no rebound                tenderness   Extremities: Dressing on left lower extremity CDI .  Able to wiggle toes.     Pulses:   Pulses palpable and equal bilaterally   Skin:   No bleeding, bruising or rash          Results Review:     I reviewed the patient's new clinical results.   Results from last 7 days   Lab Units 06/22/25  0749 06/21/25  0720 06/20/25  1400   WBC 10*3/mm3 5.80 6.98  --    HEMOGLOBIN g/dL 11.9* 11.8*  --    HEMOGLOBIN, POC g/dL  --   --  13.9   HEMATOCRIT % 34.4* 33.9*  --    HEMATOCRIT POC %  --   --  41   PLATELETS 10*3/mm3 315 320  --      Results from last 7 days   Lab Units 06/22/25  0749 06/20/25  1725   SODIUM mmol/L 127* 125*   POTASSIUM mmol/L 3.6 3.9   CHLORIDE mmol/L 89* 89*   CO2 mmol/L 25.0 23.0   BUN mg/dL 7.5* 6.6*   CREATININE mg/dL 0.56* 0.54*   CALCIUM mg/dL 8.9 8.8   GLUCOSE mg/dL 92 125*     I reviewed the patient's new imaging including images and reports.    All medications reviewed.   amLODIPine, 10 mg, Oral, Daily  atorvastatin, 20 mg, Oral, Nightly  cefTRIAXone, 2,000 mg, Intravenous, Q24H  clopidogrel, 75 mg, Oral, Daily  DAPTOmycin, 6 mg/kg (Adjusted), Intravenous, Q24H  gabapentin, 100 mg, Oral, Q12H  metoprolol succinate XL, 200 mg, Oral, Daily  oxybutynin XL, 10 mg, Oral, Daily  QUEtiapine, 50 mg, Oral, Nightly  senna-docusate sodium, 2 tablet, Oral, BID  sertraline, 100 mg, Oral, Daily  sertraline, 50 mg, Oral, Daily  sodium chloride, 10 mL, Intravenous,  Q12H      acetaminophen, 650 mg, Q4H PRN   Or  acetaminophen, 650 mg, Q4H PRN  polyethylene glycol, 17 g, Daily PRN   And  bisacodyl, 5 mg, Daily PRN   And  bisacodyl, 10 mg, Daily PRN  bisacodyl, 10 mg, Daily PRN  HYDROmorphone, 0.5 mg, Q2H PRN   And  naloxone, 0.1 mg, Q5 Min PRN  labetalol, 10 mg, Q4H PRN  melatonin, 5 mg, Nightly PRN  oxyCODONE, 5 mg, Q4H PRN  sodium chloride, 500 mL, TID PRN  sodium chloride, 10 mL, PRN  sodium chloride, 40 mL, PRN        Assessment & Plan       Postoperative wound infection, left ankle, status post incision and debridement, hardware removal, antibiotic matrix placement    Chronic obstructive pulmonary disease    Essential hypertension    Generalized anxiety disorder    Hyperlipidemia    S/P ORIF (open reduction internal fixation) fracture, left ankle, ORIF Synesmosis    Surgical site infection         Plan  1. PT/OT, nonweightbearing left lower extremity  2. Pain control-prns . multimodal approach   3. IS-encourage  4. DVT proph- mechanicals, Plavix resumed.    5. Bowel regimen  6. Resume home medications as appropriate  7. Monitor post-op labs  8. Discharge planning, inpatient rehab is recommended, patient does not want that,  and wants to go home.        - Hypertension, hyperlipidemia, peripheral vascular disease: Resume home regimen of antihypertensive medications with holding parameters, statin, and Plavix as noted above     -Anxiety: Resumed Zoloft.     - Monitor operative cultures, antibiotics will be managed by infectious disease consultants. Discussed with .     Discussed with pt, and his son/daughter in law.    Sapna Aguilar MD  06/22/25  14:52 EDT

## 2025-06-22 NOTE — PLAN OF CARE
Oriented x 4 with forgetfulness. Patient cont to deny incisional pain, rather right shoulder pain is bothersome per patient. Adequately controlled with Acetaminophens. Elastic bandage CDI. Stands pivot with one assist. NWB status maintained. SBP moderately elevated. Call light in reach.

## 2025-06-22 NOTE — PROGRESS NOTES
"          Orthopaedic Surgery Progress Note    LOS: 0 days   Patient Care Team:  Mali Galeana MD as PCP - General (Internal Medicine)  Victorino Emerson Jr., MD as Referring Physician (Urology)  Huey Zuleta MD as Consulting Physician (Radiation Oncology)  Lex Nicolas MD as Consulting Physician (Gastroenterology)  Veronica Vázquez APRN as Nurse Practitioner (Nurse Practitioner)  Siva France MD as Consulting Physician (Interventional Cardiology)  Catrachito Sena MD as Consulting Physician (Cardiothoracic Surgery)  2 Days Post-Op   Procedure(s):  LEFT ANKLE INCISION AND DRAINAGE  Subjective     Interval History:   No acute events. Pain well controlled. No systemic symptoms. Asking again about going home.     Objective     Vital Signs:  Temp (24hrs), Av.9 °F (36.6 °C), Min:97.8 °F (36.6 °C), Max:98.1 °F (36.7 °C)    /81 (BP Location: Left arm, Patient Position: Lying)   Pulse 58   Temp 98.1 °F (36.7 °C) (Axillary)   Resp 16   Ht 177.8 cm (70\")   Wt 69.4 kg (153 lb)   SpO2 98%   BMI 21.95 kg/m²   Body mass index is 21.95 kg/m².    Labs:  Lab Results (last 24 hours)       Procedure Component Value Units Date/Time    Basic Metabolic Panel [[id number]]  (Abnormal) Collected: 25 0749    Specimen: Blood Updated: 25     Glucose 92 mg/dL      BUN 7.5 mg/dL      Creatinine 0.56 mg/dL      Sodium 127 mmol/L      Potassium 3.6 mmol/L      Chloride 89 mmol/L      CO2 25.0 mmol/L      Calcium 8.9 mg/dL      BUN/Creatinine Ratio 13.4     Anion Gap 13.0 mmol/L      eGFR 100.9 mL/min/1.73     Narrative:      GFR Categories in Chronic Kidney Disease (CKD)              GFR Category          GFR (mL/min/1.73)    Interpretation  G1                    90 or greater        Normal or high (1)  G2                    60-89                Mild decrease (1)  G3a                   45-59                Mild to moderate decrease  G3b                   30-44                Moderate " to severe decrease  G4                    15-29                Severe decrease  G5                    14 or less           Kidney failure    (1)In the absence of evidence of kidney disease, neither GFR category G1 or G2 fulfill the criteria for CKD.    eGFR calculation 2021 CKD-EPI creatinine equation, which does not include race as a factor    CBC (No Diff) [[id number]]  (Abnormal) Collected: 06/22/25 0749    Specimen: Blood Updated: 06/22/25 0854     WBC 5.80 10*3/mm3      RBC 3.89 10*6/mm3      Hemoglobin 11.9 g/dL      Hematocrit 34.4 %      MCV 88.4 fL      MCH 30.6 pg      MCHC 34.6 g/dL      RDW 15.3 %      RDW-SD 50.0 fl      MPV 8.1 fL      Platelets 315 10*3/mm3     Tissue / Bone Culture - Bone, Ankle, Left [[id number]]  (Abnormal) Collected: 06/20/25 1616    Specimen: Bone from Ankle, Left Updated: 06/22/25 0649     Tissue Culture Rare growth Corynebacterium species     Comment:   No definitive guidelines. All are susceptible to vancomycin. Resistance to penicillins & cephalosporins does occur.        Gram Stain Moderate (3+) WBCs per low power field      No organisms seen    Wound Culture - Surgical Site, Ankle, Left [[id number]] Collected: 06/20/25 1618    Specimen: Surgical Site from Ankle, Left Updated: 06/22/25 0645     Wound Culture No growth at 2 days     Gram Stain Rare (1+) WBCs per low power field      No epithelial cells seen      No organisms seen            Physical Exam:  left LE: dressing removed for exam, SS drainage on dressing,   Lateral ankle incision clean and intact with sutures in place   compartments soft/compressible leg   +wiggling toes   SILT in foot in all distributions   foot WWP    Assessment/Plan:  2 Days Post-Op status post:  Procedure(s):  LEFT ANKLE INCISION AND DRAINAGE    -NWB operative extremity in boot  -Advance diet as tolerated  -Keep dressing clean and dry  -DVT prophylaxis, mechanical and plavix  -Consult ID, appreciate recs, abx per ID  -Pain  control  -PT/OT  -Follow-up Intra-Op cultures bone corynebacterium  -Elevate operative extremity  -Consult medicine appreciate recs  -Dispo pending    Oliver Trejo MD  06/22/25  13:44 EDT

## 2025-06-22 NOTE — PLAN OF CARE
Goal Outcome Evaluation:  Plan of Care Reviewed With: patient           Outcome Evaluation: Pt continues to be a high fall risk and is unable to maintain NWB status LLE with transfers despite max cues and education. Highly recommend IRF placement at d/c for best outcome. Pt requires max assist of 2 for pivot transfers and fatigues quickly. Spoke at length with pt re: recommendations for IRF and pt is still reluctant to consider despite encouragement. I do not see that case management has seen pt yet.    Anticipated Discharge Disposition (PT): inpatient rehabilitation facility

## 2025-06-22 NOTE — THERAPY TREATMENT NOTE
Patient Name: Ernesto Vann  : 1945    MRN: [id number]                              Today's Date: 2025       Admit Date: 2025    Visit Dx:     ICD-10-CM ICD-9-CM   1. Infection of bone of left ankle  M86.9 730.97     Patient Active Problem List   Diagnosis    Chronic obstructive pulmonary disease    Essential hypertension    Alcohol abuse    History of tobacco abuse    DDD (degenerative disc disease), lumbar    Generalized anxiety disorder    Hyperlipidemia    PVD (peripheral vascular disease)    Neuropathy    Snoring    Dizziness    Fatigue    Weight loss    Hyponatremia    Prostate cancer    S/P prostatectomy, robotic assisted, radical with LNs    Raynaud's phenomenon without gangrene    Critical polytrauma    Hypokalemia    Orbital floor fracture    SAH (subarachnoid hemorrhage)    Seasonal allergies    Subdural hematoma    Syncope    Blurred vision    Irritable bowel syndrome with both constipation and diarrhea    Major depressive disorder, recurrent episode, moderate    Closed fracture of left ankle    Ankle fracture    S/P ORIF (open reduction internal fixation) fracture, left ankle, ORIF Synesmosis    Malignant neoplasm of prostate    Surgery follow-up    Surgical site infection    Postoperative wound infection, left ankle, status post incision and debridement, hardware removal, antibiotic matrix placement     Past Medical History:   Diagnosis Date    Alcohol abuse     Allergic     Anxiety     Arthritis     Benign colon polyp     Cancer prostate    PROSTATE CANCER    Cataract     Chronic obstructive pulmonary disease 2016    COPD (chronic obstructive pulmonary disease)     DDD (degenerative disc disease), lumbar     Depression     Diabetes mellitus     Dyslipidemia     Elevated PSA 2018    GERD (gastroesophageal reflux disease)     Head injury     Hyperlipidemia     Hypertension     Inguinal hernia     Irritable bowel syndrome with both constipation and diarrhea 05/15/2024     PAD (peripheral artery disease)     Peripheral neuropathy     Prostate cancer     SAH (subarachnoid hemorrhage) 06/30/2021    Tobacco abuse      Past Surgical History:   Procedure Laterality Date    ANKLE OPEN REDUCTION INTERNAL FIXATION Left 05/09/2025    Procedure: OPEN REDUCTION INTERNAL FIXATION OF LEFT ANKLE, SYNDESMOTIC FIXATION;  Surgeon: Oliver Trejo MD;  Location: [facility] OR;  Service: Orthopedics;  Laterality: Left;    BELPHAROPTOSIS REPAIR Bilateral 09/01/2019    COLONOSCOPY      EYE SURGERY  Cataract    cataract    FEMORAL ARTERY - FEMORAL ARTERY BYPASS GRAFT      FEMORAL ARTERY STENT      INGUINAL HERNIA REPAIR Right     LUMBAR SYMPATHETIC NERVE BLOCK      PROSTATECTOMY N/A 11/22/2016    Procedure: PROSTATECTOMY LAPAROSCOPIC WITH [employer] ROBOT WITH NODES;  Surgeon: Victorino Emerson Jr., MD;  Location: [facility] OR;  Service:     TONSILLECTOMY      TONSILLECTOMY AND ADENOIDECTOMY      VASCULAR SURGERY      VASECTOMY      VEIN SURGERY        General Information       Row Name 06/22/25 1624          Physical Therapy Time and Intention    Document Type therapy note (daily note)  -CD     Mode of Treatment physical therapy  -CD       Row Name 06/22/25 1629          General Information    Patient Profile Reviewed yes  -CD     Existing Precautions/Restrictions fall;non-weight bearing;left;other (see comments)  NWB L LE IN CAM BOOT. PT IS S/P I&D L ANKLE WOUND AND REMOVAL OF ANKLE HARDWARE.  -CD     Barriers to Rehab medically complex;previous functional deficit;ineffective coping  CONTINUES TO REFERENCE HIS MENTAL STATE AS REASON HE NEEDS TO GO BACK HOME VS REHAB. NSG NOTIFIED.  -CD       Row Name 06/22/25 1624          Cognition    Orientation Status (Cognition) oriented x 3  -CD       Row Name 06/22/25 1626          Safety Issues/Impairments Affecting Functional Mobility    Safety Issues Affecting Function (Mobility) insight into deficits/self-awareness;awareness of need for assistance;positioning of  assistive device;safety precaution awareness;safety precautions follow-through/compliance;sequencing abilities;unable to maintain weight-bearing restrictions  -CD     Impairments Affecting Function (Mobility) balance;coordination;endurance/activity tolerance;strength;pain;postural/trunk control  -CD     Comment, Safety Issues/Impairments (Mobility) PT IS FEARFUL OF FALLING AND UNABLE TO MAINTAIN NWB RESTRICTIONS L LE DESPITE MAX CUES. FATIGUES QUICKLY.  -CD               User Key  (r) = Recorded By, (t) = Taken By, (c) = Cosigned By      Initials Name Provider Type    CD Romelia Rodríguez, PT Physical Therapist                   Mobility       Row Name 06/22/25 1626          Bed Mobility    Supine-Sit [place] (Bed Mobility) standby assist  -CD     Assistive Device (Bed Mobility) bed rails;head of bed elevated  -CD     Comment, (Bed Mobility) INCREASED TIME AND EFFORT. DONNED CAM BOOT PRIOR TO SITTING UP AT EOB.  -CD       Row Name 06/22/25 1626          Transfers    Comment, (Transfers) PT INCONTINENT X 2 EPISODES UPON STANDING. DEMONSTRATED STAND PIVOT TRANSFER WITH R WALKER AND WITHOUT R WALKER TO W/C, NWB L LE. PT REQUIRED MAX CUES FOR SEQUENCING, HAND PLACEMENT AND NWB STATUS L LE. COMPLETED STAND PIVOT BED TO W/C WITH R WALKER AND MAX ASSIST, F/B STAND PIVOT VIA B UE SUPPORT W/C TO RECLINER WITH MAX ASSIST OF 2. PT UNABLE TO MAINTAIN NWB STATUS L LE DESPITE MAX VERBAL AND TACTILE CUES. PT IS FEARFUL OF FALLING.  -CD       Row Name 06/22/25 1626          Bed-Chair Transfer    Bed-Chair [place] (Transfers) maximum assist (25% patient effort);2 person assist  -CD     Assistive Device (Bed-Chair Transfers) walker, front-wheeled  -CD       Row Name 06/22/25 1626          Gait/Stairs (Locomotion)    [place]uster Level (Gait) not tested  -CD     Comment, (Gait/Stairs) DOES NOT HAVE SHOE HERE FOR R LE, UNABLE TO MAINTAIN NWB STATUS L LE WITH TRANSFERS  AND FATIGUED.  -CD               User Key  (r) = Recorded  By, (t) = Taken By, (c) = Cosigned By      Initials Name Provider Type    Romelia Ruiz PT Physical Therapist                   Obj/Interventions       Row Name 06/22/25 1630          Motor Skills    Functional Endurance FATIGUES QUICKLY IN STANDING, WHILE ATTEMPTING TO KEEP L LE NWB.  -CD       Row Name 06/22/25 1630          Balance    Static Sitting Balance independent  -CD     Dynamic Sitting Balance standby assist  -CD     Position, Sitting Balance unsupported;sitting edge of bed  -CD     Static Standing Balance moderate assist  -CD     Dynamic Standing Balance maximum assist;2-person assist  -CD     Position/Device Used, Standing Balance supported;walker, rolling  AND B UE SUPPORT.  -CD     Comment, Balance REQUIRES MAX ASSIST OF 2 AND R WALKER FOR BALANCE AND UNABLE TO MAINTAIN NWB STATUS L LE.  -CD               User Key  (r) = Recorded By, (t) = Taken By, (c) = Cosigned By      Initials Name Provider Type    Romelia Riuz, PT Physical Therapist                   Goals/Plan    No documentation.                  Clinical Impression       Row Name 06/22/25 1632          Pain    Pretreatment Pain Rating 0/10 - no pain  -CD     Posttreatment Pain Rating 0/10 - no pain  -CD     Pain Location ankle  -CD     Pre/Posttreatment Pain Comment PT DENIED PAIN PRE AND POST, DESPITE HAVING DIFFICULTY MAINTAINING NWB STATUS L LE.  -CD       Row Name 06/22/25 163          Plan of Care Review    Plan of Care Reviewed With patient  -CD     Outcome Evaluation Pt continues to be a high fall risk and is unable to maintain NWB status LLE with transfers despite max cues and education. Highly recommend IRF placement at d/c for best outcome. Pt requires max assist of 2 for pivot transfers and fatigues quickly. Spoke at length with pt re: recommendations for IRF and pt is still reluctant to consider despite encouragement. I do not see that case management has seen pt yet.  -CD       Row Name 06/22/25 1635          Therapy  Assessment/Plan (PT)    Patient/Family Therapy Goals Statement (PT) to go home.  -CD     Rehab Potential (PT) fair  -CD     Criteria for Skilled Interventions Met (PT) yes;meets criteria;skilled treatment is necessary  -CD     Therapy Frequency (PT) daily  -CD       Row Name 06/22/25 1632          Vital Signs    Pre Systolic BP Rehab 121  -CD     Pre Treatment Diastolic BP 69  -CD     Post Systolic BP Rehab 138  -CD     Post Treatment Diastolic BP 68  -CD     Pretreatment Heart Rate (beats/min) 95  -CD     Posttreatment Heart Rate (beats/min) 96  -CD     Pre SpO2 (%) 99  -CD     O2 Delivery Pre Treatment room air  -CD     O2 Delivery Intra Treatment room air  -CD     Post SpO2 (%) 99  -CD     O2 Delivery Post Treatment room air  -CD     Pre Patient Position Supine  -CD     Intra Patient Position Standing  -CD     Post Patient Position Sitting  -CD       Row Name 06/22/25 1632          Positioning and Restraints    Pre-Treatment Position in bed  -CD     Post Treatment Position chair  -CD     In Chair reclined;call light within reach;encouraged to call for assist;exit alarm on;legs elevated;notified nsg  iN CAM BOOT. NSG NOTIFIED OF CURRENT MOBILITY STATUS AND REC'S FOR ASSIST WITH TRANSFERS, NWB L LE IN BOOT. MAY WANT TO CONSIDER LIFT BACK TO BED DUE TO DIFFICULTY MAINTAINING NWB STATUS L LE.  -CD               User Key  (r) = Recorded By, (t) = Taken By, (c) = Cosigned By      Initials Name Provider Type    CD Romelia Rodríguez, PT Physical Therapist                   Outcome Measures       Row Name 06/22/25 1639          How much help from another person do you currently need...    Turning from your back to your side while in flat bed without using bedrails? 4  -CD     Moving from lying on back to sitting on the side of a flat bed without bedrails? 3  -CD     Moving to and from a bed to a chair (including a wheelchair)? 2  -CD     Standing up from a chair using your arms (e.g., wheelchair, bedside chair)? 2  -CD      Climbing 3-5 steps with a railing? 1  -CD     To walk in hospital room? 1  -CD     AM-PAC 6 Clicks Score (PT) 13  -CD               User Key  (r) = Recorded By, (t) = Taken By, (c) = Cosigned By      Initials Name Provider Type    Romelia Ruiz PT Physical Therapist                                 Physical Therapy Education       Title: PT OT SLP Therapies (Done)       Topic: Physical Therapy (Done)       Point: Mobility training (Done)       Learning Progress Summary            Patient Acceptance, E, VU,NR by CD at 6/22/2025 1639    Comment: SEE FLOWSHEET    Acceptance, E, VU,NR by CD at 6/21/2025 1614    Comment: BENEFITS OF OOB ACTIVITY, SAFETY WITH MOBILITY, PROGRESSION OF POC, D/C PLANNING, GAIT TRAINING/TRANSFER TRAINING, NWB STATUS L LE.                      Point: Home exercise program (Done)       Learning Progress Summary            Patient Acceptance, E, VU,NR by CD at 6/22/2025 1639    Comment: SEE FLOWSHEET    Acceptance, E, VU,NR by CD at 6/21/2025 1614    Comment: BENEFITS OF OOB ACTIVITY, SAFETY WITH MOBILITY, PROGRESSION OF POC, D/C PLANNING, GAIT TRAINING/TRANSFER TRAINING, NWB STATUS L LE.                      Point: Body mechanics (Done)       Learning Progress Summary            Patient Acceptance, E, VU,NR by CD at 6/22/2025 1639    Comment: SEE FLOWSHEET    Acceptance, E, VU,NR by CD at 6/21/2025 1614    Comment: BENEFITS OF OOB ACTIVITY, SAFETY WITH MOBILITY, PROGRESSION OF POC, D/C PLANNING, GAIT TRAINING/TRANSFER TRAINING, NWB STATUS L LE.                      Point: Precautions (Done)       Learning Progress Summary            Patient Acceptance, E, VU,NR by CD at 6/22/2025 1639    Comment: SEE FLOWSHEET    Acceptance, E, VU,NR by CD at 6/21/2025 1614    Comment: BENEFITS OF OOB ACTIVITY, SAFETY WITH MOBILITY, PROGRESSION OF POC, D/C PLANNING, GAIT TRAINING/TRANSFER TRAINING, NWB STATUS L LE.                                      User Key       Initials Effective Dates Name Provider  Type Discipline    CD 02/03/23 -  Romelia Rodríguez PT Physical Therapist PT                  PT Recommendation and Plan  Planned Therapy Interventions (PT): balance training, gait training, home exercise program, transfer training, strengthening, wheelchair management/propulsion training  Outcome Evaluation: Pt continues to be a high fall risk and is unable to maintain NWB status LLE with transfers despite max cues and education. Highly recommend IRF placement at d/c for best outcome. Pt requires max assist of 2 for pivot transfers and fatigues quickly. Spoke at length with pt re: recommendations for IRF and pt is still reluctant to consider despite encouragement. I do not see that case management has seen pt yet.     Time Calculation:   PT Evaluation Complexity  History, PT Evaluation Complexity: 3 or more personal factors and/or comorbidities  Examination of Body Systems (PT Eval Complexity): total of 4 or more elements  Clinical Presentation (PT Evaluation Complexity): evolving  Clinical Decision Making (PT Evaluation Complexity): moderate complexity  Overall Complexity (PT Evaluation Complexity): moderate complexity     PT Charges       Row Name 06/22/25 1640             Time Calculation    Start Time 1518  -CD      PT Received On 06/22/25  -CD         Time Calculation- PT    Total Timed Code Minutes- PT 62 minute(s)  -CD         Timed Charges    05148 - PT Therapeutic Activity Minutes 62  -CD         Total Minutes    Timed Charges Total Minutes 62  -CD       Total Minutes 62  -CD                User Key  (r) = Recorded By, (t) = Taken By, (c) = Cosigned By      Initials Name Provider Type    CD Romelia Rodríguez PT Physical Therapist                  Therapy Charges for Today       Code Description Service Date Service Provider Modifiers Qty    54758318118 HC GAIT TRAINING EA 15 MIN 6/21/2025 Romelia Rodríguez, PT GP 1    20388870839 HC PT EVAL MOD COMPLEXITY 4 6/21/2025 Romelia Rodríguez, PT GP 1    [id number]  PT  THERAPEUTIC ACT EA 15 MIN 6/22/2025 Romelia Rodríguez, PT GP 4            PT G-Codes  Outcome Measure Options: AM-PAC 6 Clicks Daily Activity (OT)  AM-PAC 6 Clicks Score (PT): 13  AM-PAC 6 Clicks Score (OT): 16  PT Discharge Summary  Anticipated Discharge Disposition (PT): inpatient rehabilitation facility    Romelia Rodríguez, PT  6/22/2025

## 2025-06-22 NOTE — PROGRESS NOTES
INFECTIOUS DISEASE Progress note     Ernesto Vann  1945  [id number]        Admission Date: 6/20/2025      Requesting Provider: Oliver Trejo MD  Evaluating Physician: LUCY Trent    Reason for Consultation: surgical site infection/osteomyelitis     History of present illness:    Patient is a 79 y.o. male, with PMH alcohol abuse, prostate cancer, COPD, DDD, DM, GERD, PAD, seen today for a surgical site infection/osteomyelitis.  Recent admission here, for left ankle fracture after a mechanical fall, undergoing an ORIF 5/9/25. He was seen yesterday in followup by Dr. Trejo, cast removed, with noted area of wound dehiscence and exposed hardware of left ankle. Admitted and underwent deep hardware removal, debridement. He has been afebrile.  Admitting labs with SCr 0.54, CRP 4.12, WBC 6.98, .  Surgical culture no growth so far.    We were consulted for evaluation and treatment.   He denies fever, chills, sweats.     6/22/25: He remains afebrile.  Surgical cultures with Corynebacterium.  His wife doesn't think she can bring him to the office everyday with the wheelchair,.  He would like to go home.         Past Medical History:   Diagnosis Date    Alcohol abuse     Allergic     Anxiety     Arthritis     Benign colon polyp     Cancer prostate    PROSTATE CANCER    Cataract     Chronic obstructive pulmonary disease 06/17/2016    COPD (chronic obstructive pulmonary disease)     DDD (degenerative disc disease), lumbar     Depression     Diabetes mellitus     Dyslipidemia     Elevated PSA 12/2018    GERD (gastroesophageal reflux disease)     Head injury     Hyperlipidemia     Hypertension     Inguinal hernia     Irritable bowel syndrome with both constipation and diarrhea 05/15/2024    PAD (peripheral artery disease)     Peripheral neuropathy     Prostate cancer     SAH (subarachnoid hemorrhage) 06/30/2021    Tobacco abuse        Past Surgical History:   Procedure Laterality Date    ANKLE OPEN  REDUCTION INTERNAL FIXATION Left 2025    Procedure: OPEN REDUCTION INTERNAL FIXATION OF LEFT ANKLE, SYNDESMOTIC FIXATION;  Surgeon: Oliver Trejo MD;  Location: [facility] OR;  Service: Orthopedics;  Laterality: Left;    BELPHAROPTOSIS REPAIR Bilateral 2019    COLONOSCOPY      EYE SURGERY  Cataract    cataract    FEMORAL ARTERY - FEMORAL ARTERY BYPASS GRAFT      FEMORAL ARTERY STENT      INGUINAL HERNIA REPAIR Right     LUMBAR SYMPATHETIC NERVE BLOCK      PROSTATECTOMY N/A 2016    Procedure: PROSTATECTOMY LAPAROSCOPIC WITH [employer] ROBOT WITH NODES;  Surgeon: Victorino Emerson Jr., MD;  Location: [facility] OR;  Service:     TONSILLECTOMY      TONSILLECTOMY AND ADENOIDECTOMY      VASCULAR SURGERY      VASECTOMY      VEIN SURGERY         Family History   Problem Relation Age of Onset    Cancer Mother     Hypertension Mother     Dementia Mother     Heart disease Father     Heart attack Father     Breast cancer Sister     Brain cancer Sister     Lung cancer Sister     Dementia Maternal Grandmother     Cancer Sister     Cancer Sister        Social History     Socioeconomic History    Marital status:     Number of children: 1   Tobacco Use    Smoking status: Former     Current packs/day: 0.00     Average packs/day: 1 pack/day for 30.0 years (30.0 ttl pk-yrs)     Types: Cigarettes     Start date: 2000     Quit date: 2023     Years since quittin.9     Passive exposure: Past    Smokeless tobacco: Never    Tobacco comments:     HAS SMOKED 1PPD IN THE PAST   Vaping Use    Vaping status: Never Used   Substance and Sexual Activity    Alcohol use: Not Currently     Alcohol/week: 30.0 - 35.0 standard drinks of alcohol     Types: 30 - 35 Glasses of wine per week     Comment: admits to 6 daily    Drug use: No    Sexual activity: Defer       Allergies   Allergen Reactions    Diclofenac GI Bleeding and Unknown (See Comments)    Tofranil [Imipramine Hcl] Other (See Comments)     Syncope, falls     Lisinopril Cough         Medication:    Current Facility-Administered Medications:     acetaminophen (TYLENOL) tablet 650 mg, 650 mg, Oral, Q4H PRN, 650 mg at 06/22/25 0901 **OR** acetaminophen (TYLENOL) suppository 650 mg, 650 mg, Rectal, Q4H PRN, Oliver Trejo MD    amLODIPine (NORVASC) tablet 10 mg, 10 mg, Oral, Daily, Oliver Trejo MD, 10 mg at 06/22/25 0901    atorvastatin (LIPITOR) tablet 20 mg, 20 mg, Oral, Nightly, Oliver Trejo MD, 20 mg at 06/21/25 1948    sennosides-docusate (PERICOLACE) 8.6-50 MG per tablet 2 tablet, 2 tablet, Oral, BID **AND** polyethylene glycol (MIRALAX) packet 17 g, 17 g, Oral, Daily PRN **AND** bisacodyl (DULCOLAX) EC tablet 5 mg, 5 mg, Oral, Daily PRN **AND** bisacodyl (DULCOLAX) suppository 10 mg, 10 mg, Rectal, Daily PRN, Sapna Aguilar MD    bisacodyl (DULCOLAX) suppository 10 mg, 10 mg, Rectal, Daily PRN, Oliver Trejo MD    [Held by provider] clopidogrel (PLAVIX) tablet 75 mg, 75 mg, Oral, Daily, Oliver Trejo MD    DAPTOmycin (CUBICIN) 400 mg in sodium chloride 0.9 % 50 mL IVPB, 6 mg/kg (Adjusted), Intravenous, Q24H, Sherley Fontanez, LUCY, Last Rate: 100 mL/hr at 06/21/25 1251, 400 mg at 06/21/25 1251    gabapentin (NEURONTIN) capsule 100 mg, 100 mg, Oral, Q12H, Oliver Trejo MD, 100 mg at 06/22/25 0901    HYDROmorphone (DILAUDID) injection 0.5 mg, 0.5 mg, Intravenous, Q2H PRN **AND** naloxone (NARCAN) injection 0.1 mg, 0.1 mg, Intravenous, Q5 Min PRN, Oliver Trejo MD    labetalol (NORMODYNE,TRANDATE) injection 10 mg, 10 mg, Intravenous, Q4H PRN, Sapna Aguilar MD    melatonin tablet 5 mg, 5 mg, Oral, Nightly PRN, Oliver Trejo MD    metoprolol succinate XL (TOPROL-XL) 24 hr tablet 200 mg, 200 mg, Oral, Daily, Oliver Trejo MD, 200 mg at 06/22/25 0901    oxybutynin XL (DITROPAN-XL) 24 hr tablet 10 mg, 10 mg, Oral, Daily, Oliver Trejo MD, 10 mg at 06/22/25 0901    oxyCODONE (ROXICODONE) immediate release tablet 5 mg, 5 mg, Oral, Q4H PRN, Maya  MD Oliver    piperacillin-tazobactam (ZOSYN) 4.5 g IVPB in 100 mL NS MBP (CD), 4.5 g, Intravenous, Q8H, Sherley Fontanez APRN, 4.5 g at 25 0901    QUEtiapine (SEROquel) tablet 50 mg, 50 mg, Oral, Nightly, Oliver Trejo MD, 50 mg at 25 1949    sertraline (ZOLOFT) tablet 100 mg, 100 mg, Oral, Daily, Oliver Trejo MD, 100 mg at 25 0901    sertraline (ZOLOFT) tablet 50 mg, 50 mg, Oral, Daily, Oliver Trejo MD, 50 mg at 25 0901    sodium chloride 0.9 % bolus 500 mL, 500 mL, Intravenous, TID PRN, Sapna Aguilar MD    sodium chloride 0.9 % flush 10 mL, 10 mL, Intravenous, Q12H, Oliver Trejo MD, 10 mL at 25 0902    sodium chloride 0.9 % flush 10 mL, 10 mL, Intravenous, PRN, Oliver Trejo MD    sodium chloride 0.9 % infusion 40 mL, 40 mL, Intravenous, PRN, Oliver Trejo MD    Antibiotics:  Anti-Infectives (From admission, onward)      Ordered     Dose/Rate Route Frequency Start Stop    25 0909  piperacillin-tazobactam (ZOSYN) 4.5 g IVPB in 100 mL NS MBP (CD)        Ordering Provider: Sherley Fontanez APRN    4.5 g  over 4 Hours Intravenous Every 8 Hours 25 1600 25 1559    25 0909  DAPTOmycin (CUBICIN) 400 mg in sodium chloride 0.9 % 50 mL IVPB        Ordering Provider: Sherley Fontanez APRN    6 mg/kg × 69.4 kg (Adjusted)  100 mL/hr over 30 Minutes Intravenous Every 24 Hours 25 1100 25 1059    25 0909  piperacillin-tazobactam (ZOSYN) 4.5 g IVPB in 100 mL NS MBP (CD)        Ordering Provider: Sherley Fontanez APRN    4.5 g  over 30 Minutes Intravenous Once 25 1000 25 0949    25 1724  ceFAZolin 2000 mg IVPB in 100 mL NS (MBP)  Status:  Discontinued        Ordering Provider: Oliver Trejo MD    2 g  over 30 Minutes Intravenous Every 8 Hours 25 2300 25 0906                  Physical Exam:   Vital Signs  Temp (24hrs), Av.9 °F (36.6 °C), Min:97.8 °F (36.6 °C), Max:98.1 °F (36.7 °C)    Temp  Min: 97.8  "°F (36.6 °C)  Max: 98.1 °F (36.7 °C)  BP  Min: 146/68  Max: 169/81  Pulse  Min: 55  Max: 65  Resp  Min: 16  Max: 16  SpO2  Min: 96 %  Max: 98 %    GENERAL: Awake and alert, in no acute distress.   HEENT: Normocephalic, atraumatic.  PERRL. EOMI. No conjunctival injection. No icterus. Oropharynx clear without evidence of thrush or exudate. No evidence of periodontal disease.      HEART: RRR; No murmur, rubs, gallops.   LUNGS: Clear to auscultation bilaterally   ABDOMEN: Soft, nontender,   EXT:  No cyanosis, clubbing or edema.   Right foot in ace wrap   :  Without Hunter catheter.  MSK: No joint effusions or erythema  SKIN: Warm and dry without cutaneous eruptions on Inspection/palpation.    NEURO: Oriented to PPT.  Motor 5/5 strength  PSYCHIATRIC: Normal insight and judgment. Cooperative with PE    Laboratory Data    Results from last 7 days   Lab Units 06/22/25  0749 06/21/25  0720 06/20/25  1400   WBC 10*3/mm3 5.80 6.98  --    HEMOGLOBIN g/dL 11.9* 11.8*  --    HEMOGLOBIN, POC g/dL  --   --  13.9   HEMATOCRIT % 34.4* 33.9*  --    HEMATOCRIT POC %  --   --  41   PLATELETS 10*3/mm3 315 320  --      Results from last 7 days   Lab Units 06/22/25  0749   SODIUM mmol/L 127*   POTASSIUM mmol/L 3.6   CHLORIDE mmol/L 89*   CO2 mmol/L 25.0   BUN mg/dL 7.5*   CREATININE mg/dL 0.56*   GLUCOSE mg/dL 92   CALCIUM mg/dL 8.9         Results from last 7 days   Lab Units 06/21/25  0720   SED RATE mm/hr 30*     Results from last 7 days   Lab Units 06/20/25  1725   CRP mg/dL 4.12*         Results from last 7 days   Lab Units 06/21/25  1023   CK TOTAL U/L 65         Estimated Creatinine Clearance: 106.7 mL/min (A) (by C-G formula based on SCr of 0.56 mg/dL (L)).      Microbiology:  No results found for: \"ACANTHNAEG\", \"AFBCX\", \"BPERTUSSISCX\", \"BLOODCX\"  No results found for: \"BCIDPCR\", \"CXREFLEX\", \"CSFCX\", \"CULTURETIS\"  No results found for: \"CULTURES\", \"HSVCX\", \"URCX\"  No results found for: \"EYECULTURE\", \"GCCX\", \"HSVCULTURE\", " "\"LABHSV\"  No results found for: \"LEGIONELLA\", \"MRSACX\", \"MUMPSCX\", \"MYCOPLASCX\"  No results found for: \"NOCARDIACX\", \"STOOLCX\"  No results found for: \"THROATCX\", \"UNSTIMCULT\", \"URINECX\", \"CULTURE\", \"VZVCULTUR\"  Wound Culture   Date Value Ref Range Status   06/20/2025 No growth  Preliminary           Radiology:  Imaging Results (Last 72 Hours)       Procedure Component Value Units Date/Time    FL C Arm During Surgery [[id number]] Resulted: 06/20/25 1540     Updated: 06/20/25 1540    Narrative:      This procedure was auto-finalized with no dictation required.              Impression:   - Left ankle wound dehiscence/osteomyelitis- surgical incision dehiscence/exposed hardware- Corynebacterium on culture   - Left ankle fracture/ORIF 5/9/25  - s/p hardware removal/I & D left ankle  - History of alcohol abuse   - COPD      PLAN/RECOMMENDATIONS:     Continue daptomycin  Continue  ceftriaxone 2 g IV daily  - follow cultures    Dr. Salmeron has obtained the history, performed the PE, formulated the above treatment plan   Dehiscence and exposure of hardware is concerning for hardware infection.    Patient was no systemically ill prior to the cast being taken down we need to consider coagulase-negative staph, Propionibacterium acne as potential pathogens      I have discussed the case with family at length patient is wanting to go home soon it may be an option to arrange for outpatient antibiotics through our office however we need to consider patient's strength, ability to transfer.  I will inquire from orthopedics if patient can be treated as an outpatient or if rehabilitation is required  This visit included the following complex service elements:  Complex medical decision-making associated with antimicrobial prescribing.  Counseled patients, family members, and/or caregivers regarding antimicrobial stewardship and antibiotic resistance.    No role for isolation currently     See orders  1)rocephin 2 gram iv daily x 6 " weeks  1.5)daptomycin 500 mg iv daily x 6 weeks  2)weekly picc care  3)check cbc, cmp, esr, cpk q Monday  4)fax ID note to [facility] [id number]    Dc/cm time 60 minutes    Case discussed with Dr. THOMPSON, family patient wishes to go home soon as possible patient is willing to have a PICC line placed and understands that 6 weeks of IV antibiotics may be recommended.  We are going to check with  to see if infusion antibiotics are covered at home or in the office.  I suspect patient will be given daptomycin and ceftriaxone upon discharge    I will wait for 24 more hours of microbiology data before orchestrating a final plan    Copied text in this note has been reviewed and is accurate as of 06/22/25  LUCY Trent  6/22/2025  12:00 EDT

## 2025-06-23 ENCOUNTER — TELEPHONE (OUTPATIENT)
Dept: ORTHOPEDIC SURGERY | Facility: CLINIC | Age: 80
End: 2025-06-23
Payer: MEDICARE

## 2025-06-23 VITALS
WEIGHT: 153 LBS | TEMPERATURE: 98 F | SYSTOLIC BLOOD PRESSURE: 143 MMHG | RESPIRATION RATE: 16 BRPM | DIASTOLIC BLOOD PRESSURE: 75 MMHG | OXYGEN SATURATION: 98 % | HEIGHT: 70 IN | BODY MASS INDEX: 21.9 KG/M2 | HEART RATE: 61 BPM

## 2025-06-23 LAB
BACTERIA SPEC AEROBE CULT: NORMAL
GRAM STN SPEC: NORMAL

## 2025-06-23 PROCEDURE — 63710000001 GABAPENTIN 100 MG CAPSULE: Performed by: ORTHOPAEDIC SURGERY

## 2025-06-23 PROCEDURE — A9270 NON-COVERED ITEM OR SERVICE: HCPCS | Performed by: ORTHOPAEDIC SURGERY

## 2025-06-23 PROCEDURE — 63710000001 SENNOSIDES-DOCUSATE 8.6-50 MG TABLET: Performed by: INTERNAL MEDICINE

## 2025-06-23 PROCEDURE — 63710000001 AMLODIPINE 10 MG TABLET: Performed by: ORTHOPAEDIC SURGERY

## 2025-06-23 PROCEDURE — 63710000001 SERTRALINE 50 MG TABLET: Performed by: ORTHOPAEDIC SURGERY

## 2025-06-23 PROCEDURE — 97530 THERAPEUTIC ACTIVITIES: CPT

## 2025-06-23 PROCEDURE — 63710000001 OXYCODONE 5 MG TABLET: Performed by: ORTHOPAEDIC SURGERY

## 2025-06-23 PROCEDURE — A9270 NON-COVERED ITEM OR SERVICE: HCPCS | Performed by: INTERNAL MEDICINE

## 2025-06-23 PROCEDURE — 97110 THERAPEUTIC EXERCISES: CPT

## 2025-06-23 PROCEDURE — 63710000001 CLOPIDOGREL 75 MG TABLET: Performed by: ORTHOPAEDIC SURGERY

## 2025-06-23 PROCEDURE — 99024 POSTOP FOLLOW-UP VISIT: CPT | Performed by: ORTHOPAEDIC SURGERY

## 2025-06-23 PROCEDURE — 63710000001 METOPROLOL SUCCINATE XL 100 MG TABLET SUSTAINED-RELEASE 24 HOUR: Performed by: ORTHOPAEDIC SURGERY

## 2025-06-23 PROCEDURE — 63710000001 OXYBUTYNIN XL 10 MG TABLET SUSTAINED-RELEASE 24 HOUR: Performed by: ORTHOPAEDIC SURGERY

## 2025-06-23 PROCEDURE — C1751 CATH, INF, PER/CENT/MIDLINE: HCPCS

## 2025-06-23 PROCEDURE — C1894 INTRO/SHEATH, NON-LASER: HCPCS

## 2025-06-23 PROCEDURE — 63710000001 BISACODYL 10 MG SUPPOSITORY: Performed by: INTERNAL MEDICINE

## 2025-06-23 PROCEDURE — 63710000001 SERTRALINE 100 MG TABLET: Performed by: ORTHOPAEDIC SURGERY

## 2025-06-23 PROCEDURE — 63710000001 ACETAMINOPHEN 325 MG TABLET: Performed by: ORTHOPAEDIC SURGERY

## 2025-06-23 PROCEDURE — 25010000002 DAPTOMYCIN PER 1 MG: Performed by: NURSE PRACTITIONER

## 2025-06-23 RX ORDER — SODIUM CHLORIDE 0.9 % (FLUSH) 0.9 %
10 SYRINGE (ML) INJECTION EVERY 12 HOURS SCHEDULED
Status: DISCONTINUED | OUTPATIENT
Start: 2025-06-23 | End: 2025-06-23 | Stop reason: HOSPADM

## 2025-06-23 RX ORDER — POLYETHYLENE GLYCOL 3350 17 G/17G
17 POWDER, FOR SOLUTION ORAL DAILY PRN
Start: 2025-06-23

## 2025-06-23 RX ORDER — SODIUM CHLORIDE 9 MG/ML
40 INJECTION, SOLUTION INTRAVENOUS AS NEEDED
Status: DISCONTINUED | OUTPATIENT
Start: 2025-06-23 | End: 2025-06-23 | Stop reason: HOSPADM

## 2025-06-23 RX ORDER — AMOXICILLIN 250 MG
2 CAPSULE ORAL 2 TIMES DAILY
Start: 2025-06-23

## 2025-06-23 RX ORDER — SODIUM CHLORIDE 0.9 % (FLUSH) 0.9 %
20 SYRINGE (ML) INJECTION AS NEEDED
Status: DISCONTINUED | OUTPATIENT
Start: 2025-06-23 | End: 2025-06-23 | Stop reason: HOSPADM

## 2025-06-23 RX ORDER — SODIUM CHLORIDE 0.9 % (FLUSH) 0.9 %
10 SYRINGE (ML) INJECTION AS NEEDED
Status: DISCONTINUED | OUTPATIENT
Start: 2025-06-23 | End: 2025-06-23 | Stop reason: HOSPADM

## 2025-06-23 RX ADMIN — SERTRALINE 100 MG: 100 TABLET, FILM COATED ORAL at 09:06

## 2025-06-23 RX ADMIN — Medication 10 ML: at 10:43

## 2025-06-23 RX ADMIN — OXYBUTYNIN CHLORIDE 10 MG: 10 TABLET, EXTENDED RELEASE ORAL at 09:06

## 2025-06-23 RX ADMIN — AMLODIPINE BESYLATE 10 MG: 10 TABLET ORAL at 09:05

## 2025-06-23 RX ADMIN — DOCUSATE SODIUM 50 MG AND SENNOSIDES 8.6 MG 2 TABLET: 8.6; 5 TABLET, FILM COATED ORAL at 09:05

## 2025-06-23 RX ADMIN — ACETAMINOPHEN 650 MG: 325 TABLET ORAL at 09:06

## 2025-06-23 RX ADMIN — DAPTOMYCIN 400 MG: 500 INJECTION, POWDER, LYOPHILIZED, FOR SOLUTION INTRAVENOUS at 10:40

## 2025-06-23 RX ADMIN — Medication 10 ML: at 09:07

## 2025-06-23 RX ADMIN — OXYCODONE 5 MG: 5 TABLET ORAL at 15:02

## 2025-06-23 RX ADMIN — GABAPENTIN 100 MG: 100 CAPSULE ORAL at 09:05

## 2025-06-23 RX ADMIN — SERTRALINE 50 MG: 50 TABLET, FILM COATED ORAL at 09:05

## 2025-06-23 RX ADMIN — METOPROLOL SUCCINATE 200 MG: 100 TABLET, EXTENDED RELEASE ORAL at 09:05

## 2025-06-23 RX ADMIN — BISACODYL 10 MG: 10 SUPPOSITORY RECTAL at 12:08

## 2025-06-23 RX ADMIN — CLOPIDOGREL BISULFATE 75 MG: 75 TABLET, FILM COATED ORAL at 09:06

## 2025-06-23 NOTE — CASE MANAGEMENT/SOCIAL WORK
Continued Stay Note  [facility]     Patient Name: Ernesto Vann  MRN: [id number]  Today's Date: 6/23/2025    Admit Date: 6/20/2025    Plan: home vs rehab   Discharge Plan       Row Name 06/23/25 1035       Plan    Plan home vs rehab    Patient/Family in Agreement with Plan yes    Plan Comments Pt lives with his wife in [place]. He reports he was independent with ADLs prior to admit and used a knee scooter for mobility. He also has a wheelchair and walker at home. Pt is followed by his PCP and has drug coverage. Pt has reported he hopes to return home at [place] but is agreeable to a referral to [facility] for possible rehab needs. Pts wife is agreeable with this and reports if he can return home she can assist with his infusions. CM has asked [facility] to price out the cost of his infusion and have asked April with [facility] to evaluate as well .Cm to follow                   Discharge Codes    No documentation.                       Eryn Bell RN

## 2025-06-23 NOTE — TELEPHONE ENCOUNTER
1x called patient and left vm to schedule appt in ~1 week with Dr. Trejo.     *hub ok to schedule*

## 2025-06-23 NOTE — PLAN OF CARE
Problem: Adult Inpatient Plan of Care  Goal: Plan of Care Review  Outcome: Progressing  Flowsheets (Taken 6/23/2025 0429)  Progress: improving  Plan of Care Reviewed With: patient  Goal: Patient-Specific Goal (Individualized)  Outcome: Progressing  Goal: Absence of Hospital-Acquired Illness or Injury  Outcome: Progressing  Intervention: Identify and Manage Fall Risk  Recent Flowsheet Documentation  Taken 6/23/2025 0426 by Beth De La Paz RN  Safety Promotion/Fall Prevention:   activity supervised   assistive device/personal items within reach   safety round/check completed  Taken 6/23/2025 0211 by Beth De La Paz RN  Safety Promotion/Fall Prevention:   activity supervised   assistive device/personal items within reach   safety round/check completed  Taken 6/23/2025 0024 by Beth De La Paz RN  Safety Promotion/Fall Prevention:   activity supervised   assistive device/personal items within reach   safety round/check completed  Taken 6/22/2025 2200 by Beth De La Paz RN  Safety Promotion/Fall Prevention:   activity supervised   assistive device/personal items within reach   safety round/check completed  Taken 6/22/2025 2006 by Beth De La Paz RN  Safety Promotion/Fall Prevention:   activity supervised   assistive device/personal items within reach   clutter free environment maintained   fall prevention program maintained   lighting adjusted   muscle strengthening facilitated   nonskid shoes/slippers when out of bed   room organization consistent   safety round/check completed  Intervention: Prevent Skin Injury  Recent Flowsheet Documentation  Taken 6/23/2025 0426 by Beth De La Paz RN  Body Position: position changed independently  Taken 6/23/2025 0211 by Beth De La Paz RN  Body Position:   tilted   right  Taken 6/23/2025 0024 by Beth De La Paz RN  Body Position: position changed independently  Taken 6/22/2025 2200 by Beth De La Paz RN  Body Position: position changed independently  Taken 6/22/2025 2006  by Beth De La Paz RN  Body Position:   sitting up in bed   position changed independently  Skin Protection:   drying agents applied   incontinence pads utilized  Intervention: Prevent and Manage VTE (Venous Thromboembolism) Risk  Recent Flowsheet Documentation  Taken 6/23/2025 0024 by Beth De La Paz RN  VTE Prevention/Management: patient refused intervention  Taken 6/22/2025 2200 by Beth De La Paz RN  VTE Prevention/Management: patient refused intervention  Taken 6/22/2025 2006 by Beth De La Paz RN  VTE Prevention/Management:   right   SCDs (sequential compression devices) on  Intervention: Prevent Infection  Recent Flowsheet Documentation  Taken 6/23/2025 0426 by Beth De La Paz RN  Infection Prevention:   hand hygiene promoted   rest/sleep promoted  Taken 6/23/2025 0211 by Beth De La Paz RN  Infection Prevention:   hand hygiene promoted   rest/sleep promoted  Taken 6/23/2025 0024 by Beth De La Paz RN  Infection Prevention:   hand hygiene promoted   rest/sleep promoted  Taken 6/22/2025 2200 by Beth De La Paz RN  Infection Prevention:   hand hygiene promoted   rest/sleep promoted  Taken 6/22/2025 2006 by Beth De La Paz RN  Infection Prevention:   hand hygiene promoted   rest/sleep promoted  Goal: Optimal Comfort and Wellbeing  Outcome: Progressing  Intervention: Monitor Pain and Promote Comfort  Recent Flowsheet Documentation  Taken 6/22/2025 2006 by Beth De La Paz RN  Pain Management Interventions: pain medication given  Intervention: Provide Person-Centered Care  Recent Flowsheet Documentation  Taken 6/23/2025 0426 by Beth De La Paz RN  Trust Relationship/Rapport: care explained  Taken 6/23/2025 0211 by Beth De La Paz RN  Trust Relationship/Rapport: care explained  Taken 6/23/2025 0024 by Beth De La Paz RN  Trust Relationship/Rapport: care explained  Taken 6/22/2025 2200 by Beth De La Paz RN  Trust Relationship/Rapport: care explained  Taken 6/22/2025 2006 by Beth De La Paz RN Trust  Relationship/Rapport:   care explained   choices provided   questions answered   questions encouraged   reassurance provided  Goal: Readiness for Transition of Care  Outcome: Progressing     Problem: Skin Injury Risk Increased  Goal: Skin Health and Integrity  Outcome: Progressing  Intervention: Optimize Skin Protection  Recent Flowsheet Documentation  Taken 6/23/2025 0426 by Beth De La Paz RN  Activity Management: activity encouraged  Head of Bed (HOB) Positioning: HOB elevated  Taken 6/23/2025 0211 by Beth De La Paz RN  Activity Management: activity encouraged  Head of Bed (HOB) Positioning: HOB elevated  Taken 6/23/2025 0024 by Beth De La Paz RN  Activity Management: activity encouraged  Head of Bed (HOB) Positioning: HOB elevated  Taken 6/22/2025 2200 by Beth De La Paz RN  Activity Management: activity encouraged  Head of Bed (HOB) Positioning: HOB elevated  Taken 6/22/2025 2006 by Beth De La Paz RN  Activity Management: activity encouraged  Pressure Reduction Techniques: frequent weight shift encouraged  Head of Bed (HOB) Positioning: HOB elevated  Pressure Reduction Devices: pressure-redistributing mattress utilized  Skin Protection:   drying agents applied   incontinence pads utilized     Problem: Fall Injury Risk  Goal: Absence of Fall and Fall-Related Injury  Outcome: Progressing  Intervention: Identify and Manage Contributors  Recent Flowsheet Documentation  Taken 6/23/2025 0426 by Beth De La Paz RN  Self-Care Promotion: independence encouraged  Taken 6/23/2025 0211 by Beth De La Paz RN  Self-Care Promotion: independence encouraged  Taken 6/23/2025 0024 by Beth De La Paz RN  Self-Care Promotion: independence encouraged  Taken 6/22/2025 2200 by Beth De La Paz RN  Self-Care Promotion: independence encouraged  Taken 6/22/2025 2006 by Beth De La Paz RN  Medication Review/Management: medications reviewed  Self-Care Promotion: independence encouraged  Intervention: Promote Injury-Free  Environment  Recent Flowsheet Documentation  Taken 6/23/2025 0426 by Beth De La Paz RN  Safety Promotion/Fall Prevention:   activity supervised   assistive device/personal items within reach   safety round/check completed  Taken 6/23/2025 0211 by Beth De La Paz RN  Safety Promotion/Fall Prevention:   activity supervised   assistive device/personal items within reach   safety round/check completed  Taken 6/23/2025 0024 by Beth De La Paz RN  Safety Promotion/Fall Prevention:   activity supervised   assistive device/personal items within reach   safety round/check completed  Taken 6/22/2025 2200 by Beth De La Paz RN  Safety Promotion/Fall Prevention:   activity supervised   assistive device/personal items within reach   safety round/check completed  Taken 6/22/2025 2006 by Beth De La Paz RN  Safety Promotion/Fall Prevention:   activity supervised   assistive device/personal items within reach   clutter free environment maintained   fall prevention program maintained   lighting adjusted   muscle strengthening facilitated   nonskid shoes/slippers when out of bed   room organization consistent   safety round/check completed     Problem: Comorbidity Management  Goal: Blood Pressure in Desired Range  Outcome: Progressing  Intervention: Maintain Blood Pressure Management  Recent Flowsheet Documentation  Taken 6/22/2025 2006 by Beth De La Paz RN  Medication Review/Management: medications reviewed  Goal: Maintenance of COPD Symptom Control  Outcome: Progressing  Intervention: Maintain COPD (Chronic Obstructive Pulmonary Disease) Symptom Control  Recent Flowsheet Documentation  Taken 6/22/2025 2006 by Beth De La Paz RN  Medication Review/Management: medications reviewed     Problem: Mobility Impairment  Goal: Optimal Mobility  Outcome: Progressing  Intervention: Optimize Mobility  Recent Flowsheet Documentation  Taken 6/23/2025 0426 by Beth De La Paz RN  Activity Management: activity  encouraged  Positioning/Transfer Devices:   pillows   in use  Taken 6/23/2025 0211 by Beth De La Paz RN  Activity Management: activity encouraged  Positioning/Transfer Devices:   pillows   in use  Taken 6/23/2025 0024 by Beth De La Paz RN  Activity Management: activity encouraged  Positioning/Transfer Devices:   pillows   in use  Taken 6/22/2025 2200 by Beth De La Paz RN  Activity Management: activity encouraged  Positioning/Transfer Devices:   pillows   in use  Taken 6/22/2025 2006 by Beth De La Paz RN  Activity Management: activity encouraged  Positioning/Transfer Devices:   pillows   in use     Problem: Pain Acute  Goal: Optimal Pain Control and Function  Outcome: Progressing  Intervention: Optimize Psychosocial Wellbeing  Recent Flowsheet Documentation  Taken 6/22/2025 2006 by Beth De La Paz RN  Diversional Activities: television  Intervention: Develop Pain Management Plan  Recent Flowsheet Documentation  Taken 6/22/2025 2006 by Beth De La Paz RN  Pain Management Interventions: pain medication given  Intervention: Prevent or Manage Pain  Recent Flowsheet Documentation  Taken 6/22/2025 2006 by Beth De La Paz RN  Medication Review/Management: medications reviewed   Goal Outcome Evaluation:  Plan of Care Reviewed With: patient      Pt currently in bed resting quietly. No complaints of pain or discomfort at this time. NWB to LLE. ACE CDI. LLE elevated while in bed. VSS on RA. Neuro checks WNL. Plans for SNF when ready. No other observations at this time, call bell in reach.  Progress: improving

## 2025-06-23 NOTE — DISCHARGE INSTR - ACTIVITY
-NWB operative extremity in boot  -Keep dressing clean and dry, xeroform, 4x4s, ABD, secured with ACE wrap. Ever other day dressing changes  -Elevate operative extremity

## 2025-06-23 NOTE — DISCHARGE SUMMARY
Patient Name: Ernesto Vann  MRN: [id number]  : 1945  DOS: 2025    Attending: Oliver Trejo MD    Primary Care Provider: Mali Galeana MD    Date of Admission:.2025 12:39 PM    Date of Discharge:  2025    Discharge Diagnosis:   Postoperative wound infection, left ankle, status post incision and debridement, hardware removal, antibiotic matrix placement    Chronic obstructive pulmonary disease    Essential hypertension    Generalized anxiety disorder    Hyperlipidemia    S/P ORIF (open reduction internal fixation) fracture, left ankle, ORIF Synesmosis    Surgical site infection  Chronic hyponatremia    Hospital Course    At admit:      Patient is a pleasant 79 y.o. male presented to Dr. Trejo's office for a follow-up postoperatively and was found to have dehisced wound with exposed hardware.     Patient is known to us from his hospitalization in May of this year when he had ORIF of his left ankle fracture.  He had a splint on since then and that was removed at the office by Dr. Trejo prior to patient being sent for surgery and admission.     He has no nausea or vomiting, no shortness of breath, no fever or chills.     He underwent incision and debridement with hardware removal and antibiotic matrix placement.  Tolerated surgery well and was admitted for further management.     No recent medical issues since his discharge on 5/10/2025    After admit:    Patient was provided pain medications as needed for pain control, multimodal approach. Had a single shot block as well.    Adjustments were made to pain medications to optimize postop pain management.     Risks and benefits of opiate medications discussed with patient.  Juan Jose report in chart was reviewed prior to discharge.    He was seen by PT and OT and inpatient rehab was recommended.      Patient used an IS for atelectasis prophylaxis and aspirin along with mechanicals for DVT prophylaxis.    Home medications were resumed as  appropriate, and labs were monitored and remained fairly stable.     He received IV antibiotics under the direction of Talon Salmeron MD with [facility], a PICC line was placed in expectation of several weeks of IV antibiotics.  Patient will be followed by ID during his admit at [facility].    With the progress he has made, patient is ready for discharge to [facility] today.      Keep dressing clean and dry until follow up appointment with Dr. Trejo.    Patient will remain nonweightbearing on the operative foot.    Discussed with patient regarding plan and shows shows understanding and agreement.     Procedures Performed    YOB: 1945     Date of Surgery:  6/20/2025     Pre-op Diagnosis:   Left ankle surgical site infection      Post-op Diagnosis:      Same     Procedure/CPT® Codes:  1.  Ankle deep hardware removal, 20680  2.  Ankle debridement of bone, area debrided was less than 20 cm², 49707   3.  Ankle placement of antibiotics impregnated matrix, 20700     Staff:  Surgeon(s):  Oliver Trejo MD        Anesthesia: Choice     Estimated Blood Loss: minimal     Specimen:          None     Complications:   None    Pertinent Test Results:    I reviewed the patient's new clinical results.   Results from last 7 days   Lab Units 06/22/25  0749 06/21/25  0720 06/20/25  1400   WBC 10*3/mm3 5.80 6.98  --    HEMOGLOBIN g/dL 11.9* 11.8*  --    HEMOGLOBIN, POC g/dL  --   --  13.9   HEMATOCRIT % 34.4* 33.9*  --    HEMATOCRIT POC %  --   --  41   PLATELETS 10*3/mm3 315 320  --      Results from last 7 days   Lab Units 06/22/25  0749 06/20/25  1725   SODIUM mmol/L 127* 125*   POTASSIUM mmol/L 3.6 3.9   CHLORIDE mmol/L 89* 89*   CO2 mmol/L 25.0 23.0   BUN mg/dL 7.5* 6.6*   CREATININE mg/dL 0.56* 0.54*   CALCIUM mg/dL 8.9 8.8   GLUCOSE mg/dL 92 125*       I reviewed the patient's new imaging including images and reports.      Physical therapy:   Date of Service:  "25 1132  Creation Time: 25 1317     Signed         Goal Outcome Evaluation:  Plan of Care Reviewed With: patient  Progress: no change  Outcome Evaluation: Pt continues to present below his functional baseline with balance deficits, weakness, decreased endurance, and impaired safety awareness. Bed to chair transfer with max Ax2 and RW. Improved ability to maintain NWB status this session. Further IPPT is warrented. Pt is open to rehab after prolonged discussion of benefits. Rec IRF when medically appropriate.     Anticipated Discharge Disposition (PT): inpatient rehabilitation facility                Discharge Assessment:    Vital Signs  Visit Vitals  /75   Pulse 61   Temp 98 °F (36.7 °C) (Oral)   Resp 16   Ht 177.8 cm (70\")   Wt 69.4 kg (153 lb)   SpO2 98%   BMI 21.95 kg/m²     Temp (24hrs), Av.9 °F (36.6 °C), Min:97.7 °F (36.5 °C), Max:98 °F (36.7 °C)      General Appearance:    Alert, cooperative, in no acute distress   Lungs:     Clear to auscultation,respirations regular, even and unlabored    Heart:    Regular rhythm and normal rate, normal S1 and S2   Abdomen:     Normal bowel sounds, no masses, no organomegaly, soft non-tender, non-distended, no guarding, no rebound tenderness   Extremities: Clean dry and intact dressing on left foot/ankle.   Pulses:   Pulses palpable and equal bilaterally   Skin:   No bleeding, bruising or rash            Discharge Disposition: [facility]          Discharge Medications        New Medications        Instructions Start Date   cefTRIAXone 2,000 mg in sodium chloride 0.9 % 100 mL IVPB   2,000 mg, Intravenous, Every 24 Hours      DAPTOmycin 400 mg in sodium chloride 0.9 % 50 mL   6 mg/kg (400 mg), Intravenous, Every 24 Hours   Start Date: 2025     melatonin 5 MG tablet tablet   5 mg, Oral, Nightly PRN      polyethylene glycol 17 g packet  Commonly known as: MIRALAX   17 g, Oral, Daily PRN      sennosides-docusate 8.6-50 MG per tablet  Commonly known as: " PERICOLACE   2 tablets, Oral, 2 Times Daily             Continue These Medications        Instructions Start Date   acetaminophen 500 MG tablet  Commonly known as: TYLENOL   500 mg, Oral, Every 6 Hours PRN      amLODIPine 10 MG tablet  Commonly known as: NORVASC   10 mg, Oral, Daily      atorvastatin 20 MG tablet  Commonly known as: LIPITOR   20 mg, Oral, Daily      clopidogrel 75 MG tablet  Commonly known as: PLAVIX   TAKE 1 TABLET BY MOUTH ONCE DAILY      gabapentin 100 MG capsule  Commonly known as: NEURONTIN   1 capsule, Every 12 Hours Scheduled      metoprolol succinate  MG 24 hr tablet  Commonly known as: TOPROL-XL   200 mg, Oral, Daily      oxyCODONE 5 MG immediate release tablet  Commonly known as: Roxicodone   5 mg, Oral, Every 4 Hours PRN      QUEtiapine 50 MG tablet  Commonly known as: SEROquel   50 mg, Oral, Nightly      sertraline 100 MG tablet  Commonly known as: Zoloft   Take one 50 mg tablet by mouth daily for two weeks, then take one 100 mg tablet by mouth daily for two weeks, after four weeks, increase to one 50 mg tablet by mouth daily along with one 100 mg tablet by mouth daily to equal 150 mg by mouth daily      sertraline 50 MG tablet  Commonly known as: Zoloft   Take one 50 mg tablet by mouth daily for two weeks, then take one 100 mg tablet by mouth daily for two weeks, after four weeks, increase to one 50 mg tablet by mouth daily along with one 100 mg tablet by mouth daily to equal 150 mg by mouth daily      solifenacin 10 MG tablet  Commonly known as: VESICARE   1 tablet, Daily               Discharge Diet: Resume prior    Activity at Discharge: [facility].    Follow-up Appointments  Dr. Trejo  per his orders      Sapna Aguilar MD  06/23/25  14:21 EDT

## 2025-06-23 NOTE — PROGRESS NOTES
INFECTIOUS DISEASE Progress note     Ernesto Vann  1945  [id number]        Admission Date: 6/20/2025      Requesting Provider: Oliver Trejo MD  Evaluating Physician: Talon Salmeron MD    Reason for Consultation: surgical site infection/osteomyelitis     History of present illness:    Patient is a 79 y.o. male, with PMH alcohol abuse, prostate cancer, COPD, DDD, DM, GERD, PAD, seen today for a surgical site infection/osteomyelitis.  Recent admission here, for left ankle fracture after a mechanical fall, undergoing an ORIF 5/9/25. He was seen yesterday in followup by Dr. Trejo, cast removed, with noted area of wound dehiscence and exposed hardware of left ankle. Admitted and underwent deep hardware removal, debridement. He has been afebrile.  Admitting labs with SCr 0.54, CRP 4.12, WBC 6.98, .  Surgical culture no growth so far.    We were consulted for evaluation and treatment.   He denies fever, chills, sweats.     6/22/25: He remains afebrile.  Surgical cultures with Corynebacterium.  His wife doesn't think she can bring him to the office everyday with the wheelchair,.  He would like to go home.     6/23/25; doing well; no events overnight; no fever, rash, sore throat approved to go to [facility] today      Past Medical History:   Diagnosis Date    Alcohol abuse     Allergic     Anxiety     Arthritis     Benign colon polyp     Cancer prostate    PROSTATE CANCER    Cataract     Chronic obstructive pulmonary disease 06/17/2016    COPD (chronic obstructive pulmonary disease)     DDD (degenerative disc disease), lumbar     Depression     Diabetes mellitus     Dyslipidemia     Elevated PSA 12/2018    GERD (gastroesophageal reflux disease)     Head injury     Hyperlipidemia     Hypertension     Inguinal hernia     Irritable bowel syndrome with both constipation and diarrhea 05/15/2024    PAD (peripheral artery disease)     Peripheral neuropathy     Prostate cancer     SAH (subarachnoid  hemorrhage) 2021    Tobacco abuse        Past Surgical History:   Procedure Laterality Date    ANKLE OPEN REDUCTION INTERNAL FIXATION Left 2025    Procedure: OPEN REDUCTION INTERNAL FIXATION OF LEFT ANKLE, SYNDESMOTIC FIXATION;  Surgeon: Oliver Trejo MD;  Location: [facility] OR;  Service: Orthopedics;  Laterality: Left;    BELPHAROPTOSIS REPAIR Bilateral 2019    COLONOSCOPY      EYE SURGERY  Cataract    cataract    FEMORAL ARTERY - FEMORAL ARTERY BYPASS GRAFT      FEMORAL ARTERY STENT      INCISION AND DRAINAGE LEG Left 2025    Procedure: ANKLE INCISION AND DRAINAGE LEFT;  Surgeon: Oliver Trejo MD;  Location:  [facility] OR;  Service: Orthopedics;  Laterality: Left;    INGUINAL HERNIA REPAIR Right     LUMBAR SYMPATHETIC NERVE BLOCK      PROSTATECTOMY N/A 2016    Procedure: PROSTATECTOMY LAPAROSCOPIC WITH BEST Athlete ManagementINCI ROBOT WITH NODES;  Surgeon: Victorino Emerson Jr., MD;  Location: [facility] OR;  Service:     TONSILLECTOMY      TONSILLECTOMY AND ADENOIDECTOMY      VASCULAR SURGERY      VASECTOMY      VEIN SURGERY         Family History   Problem Relation Age of Onset    Cancer Mother     Hypertension Mother     Dementia Mother     Heart disease Father     Heart attack Father     Breast cancer Sister     Brain cancer Sister     Lung cancer Sister     Dementia Maternal Grandmother     Cancer Sister     Cancer Sister        Social History     Socioeconomic History    Marital status:     Number of children: 1   Tobacco Use    Smoking status: Former     Current packs/day: 0.00     Average packs/day: 1 pack/day for 30.0 years (30.0 ttl pk-yrs)     Types: Cigarettes     Start date: 2000     Quit date: 2023     Years since quittin.9     Passive exposure: Past    Smokeless tobacco: Never    Tobacco comments:     HAS SMOKED 1PPD IN THE PAST   Vaping Use    Vaping status: Never Used   Substance and Sexual Activity    Alcohol use: Not Currently     Alcohol/week: 30.0 - 35.0 standard  drinks of alcohol     Types: 30 - 35 Glasses of wine per week     Comment: admits to 6 daily    Drug use: No    Sexual activity: Defer       Allergies   Allergen Reactions    Diclofenac GI Bleeding and Unknown (See Comments)    Tofranil [Imipramine Hcl] Other (See Comments)     Syncope, falls    Lisinopril Cough         Medication:    Current Facility-Administered Medications:     acetaminophen (TYLENOL) tablet 650 mg, 650 mg, Oral, Q4H PRN, 650 mg at 06/23/25 0906 **OR** acetaminophen (TYLENOL) suppository 650 mg, 650 mg, Rectal, Q4H PRN, Oliver Trejo MD    amLODIPine (NORVASC) tablet 10 mg, 10 mg, Oral, Daily, Oliver Trejo MD, 10 mg at 06/23/25 0905    atorvastatin (LIPITOR) tablet 20 mg, 20 mg, Oral, Nightly, Oliver Trejo MD, 20 mg at 06/22/25 2015    sennosides-docusate (PERICOLACE) 8.6-50 MG per tablet 2 tablet, 2 tablet, Oral, BID, 2 tablet at 06/23/25 0905 **AND** polyethylene glycol (MIRALAX) packet 17 g, 17 g, Oral, Daily PRN, 17 g at 06/22/25 1249 **AND** bisacodyl (DULCOLAX) EC tablet 5 mg, 5 mg, Oral, Daily PRN, 5 mg at 06/22/25 2015 **AND** bisacodyl (DULCOLAX) suppository 10 mg, 10 mg, Rectal, Daily PRN, Sapna Aguilar MD, 10 mg at 06/23/25 1208    bisacodyl (DULCOLAX) suppository 10 mg, 10 mg, Rectal, Daily PRN, Oliver Trejo MD    cefTRIAXone (ROCEPHIN) 2,000 mg in sodium chloride 0.9 % 100 mL MBP, 2,000 mg, Intravenous, Q24H, Sherley Fontanez APRN, Last Rate: 200 mL/hr at 06/22/25 1706, 2,000 mg at 06/22/25 1706    clopidogrel (PLAVIX) tablet 75 mg, 75 mg, Oral, Daily, Oliver Trejo MD, 75 mg at 06/23/25 0906    DAPTOmycin (CUBICIN) 400 mg in sodium chloride 0.9 % 50 mL IVPB, 6 mg/kg (Adjusted), Intravenous, Q24H, Sherley Fontanez APRN, Stopped at 06/23/25 1208    gabapentin (NEURONTIN) capsule 100 mg, 100 mg, Oral, Q12H, Oliver Trejo MD, 100 mg at 06/23/25 0905    HYDROmorphone (DILAUDID) injection 0.5 mg, 0.5 mg, Intravenous, Q2H PRN **AND** naloxone (NARCAN) injection 0.1  mg, 0.1 mg, Intravenous, Q5 Min PRN, Oliver Trejo MD    labetalol (NORMODYNE,TRANDATE) injection 10 mg, 10 mg, Intravenous, Q4H PRN, Sapna Aguilar MD    melatonin tablet 5 mg, 5 mg, Oral, Nightly PRN, Oliver Trejo MD, 5 mg at 06/22/25 2015    metoprolol succinate XL (TOPROL-XL) 24 hr tablet 200 mg, 200 mg, Oral, Daily, Oliver Trejo MD, 200 mg at 06/23/25 0905    oxybutynin XL (DITROPAN-XL) 24 hr tablet 10 mg, 10 mg, Oral, Daily, Oliver Trejo MD, 10 mg at 06/23/25 0906    oxyCODONE (ROXICODONE) immediate release tablet 5 mg, 5 mg, Oral, Q4H PRN, Oliver Trejo MD, 5 mg at 06/23/25 1502    QUEtiapine (SEROquel) tablet 50 mg, 50 mg, Oral, Nightly, Oliver Trejo MD, 50 mg at 06/22/25 2015    sertraline (ZOLOFT) tablet 100 mg, 100 mg, Oral, Daily, Oliver Trejo MD, 100 mg at 06/23/25 0906    sertraline (ZOLOFT) tablet 50 mg, 50 mg, Oral, Daily, Oliver Trejo MD, 50 mg at 06/23/25 0905    sodium chloride 0.9 % bolus 500 mL, 500 mL, Intravenous, TID PRN, Sapna Aguilar MD    sodium chloride 0.9 % flush 10 mL, 10 mL, Intravenous, Q12H, Oliver Trejo MD, 10 mL at 06/23/25 0907    sodium chloride 0.9 % flush 10 mL, 10 mL, Intravenous, PRN, Oliver Trejo MD    sodium chloride 0.9 % flush 10 mL, 10 mL, Intravenous, Q12H, Talon Salmeron MD, 10 mL at 06/23/25 1043    sodium chloride 0.9 % flush 10 mL, 10 mL, Intravenous, PRN, Talon Salmeron MD    sodium chloride 0.9 % flush 20 mL, 20 mL, Intravenous, PRN, Talon Salmeron MD    sodium chloride 0.9 % infusion 40 mL, 40 mL, Intravenous, PRN, Oliver Trejo MD    sodium chloride 0.9 % infusion 40 mL, 40 mL, Intravenous, PRN, Talon Salmeron MD    Antibiotics:  Anti-Infectives (From admission, onward)      Ordered     Dose/Rate Route Frequency Start Stop    06/23/25 1421  DAPTOmycin 400 mg in sodium chloride 0.9 % 50 mL        Ordering Provider: Sapna Aguilar MD    6 mg/kg × 69.4 kg (Adjusted) Intravenous Every 24 Hours  25 0000 25 2359    25 1421  cefTRIAXone 2,000 mg in sodium chloride 0.9 % 100 mL IVPB        Ordering Provider: Sapna Aguilar MD    2,000 mg Intravenous Every 24 Hours 25 0000 25 2359    25 1215  cefTRIAXone (ROCEPHIN) 2,000 mg in sodium chloride 0.9 % 100 mL MBP        Ordering Provider: Sherley Fontanez APRN    2,000 mg  200 mL/hr over 30 Minutes Intravenous Every 24 Hours 25 1600 25 1559    25 0909  piperacillin-tazobactam (ZOSYN) 4.5 g IVPB in 100 mL NS MBP (CD)  Status:  Discontinued        Ordering Provider: Sherley Fontanez APRN    4.5 g  over 4 Hours Intravenous Every 8 Hours 25 1600 25 1215    25 0909  DAPTOmycin (CUBICIN) 400 mg in sodium chloride 0.9 % 50 mL IVPB        Ordering Provider: Sherley Fontanez APRN    6 mg/kg × 69.4 kg (Adjusted)  100 mL/hr over 30 Minutes Intravenous Every 24 Hours 25 1100 25 1059    25 0909  piperacillin-tazobactam (ZOSYN) 4.5 g IVPB in 100 mL NS MBP (CD)        Ordering Provider: Sherley Fontanez APRN    4.5 g  over 30 Minutes Intravenous Once 25 1000 25 0949    25 1724  ceFAZolin 2000 mg IVPB in 100 mL NS (MBP)  Status:  Discontinued        Ordering Provider: Oliver Trejo MD    2 g  over 30 Minutes Intravenous Every 8 Hours 25 2300 25 0906                  Physical Exam:   Vital Signs  Temp (24hrs), Av.9 °F (36.6 °C), Min:97.7 °F (36.5 °C), Max:98 °F (36.7 °C)    Temp  Min: 97.7 °F (36.5 °C)  Max: 98 °F (36.7 °C)  BP  Min: 143/75  Max: 161/80  Pulse  Min: 54  Max: 61  Resp  Min: 16  Max: 16  SpO2  Min: 92 %  Max: 98 %    GENERAL: Awake and alert, in no acute distress.   HEENT: Normocephalic, atraumatic.  PERRL. EOMI. No conjunctival injection. No icterus. No ext oral lesions      HEART: RRR; No murmur, rubs, gallops.   LUNGS: Clear to auscultation bilaterally   ABDOMEN: Soft, nontender,   EXT:  No cyanosis, clubbing or edema.    "Right foot in ace wrap   :  Without Hunter catheter.  MSK: No joint effusions or erythema  SKIN: Warm and dry without cutaneous eruptions on Inspection/palpation.    NEURO: no focal deficit    Laboratory Data    Results from last 7 days   Lab Units 06/22/25  0749 06/21/25  0720 06/20/25  1400   WBC 10*3/mm3 5.80 6.98  --    HEMOGLOBIN g/dL 11.9* 11.8*  --    HEMOGLOBIN, POC g/dL  --   --  13.9   HEMATOCRIT % 34.4* 33.9*  --    HEMATOCRIT POC %  --   --  41   PLATELETS 10*3/mm3 315 320  --      Results from last 7 days   Lab Units 06/22/25  0749   SODIUM mmol/L 127*   POTASSIUM mmol/L 3.6   CHLORIDE mmol/L 89*   CO2 mmol/L 25.0   BUN mg/dL 7.5*   CREATININE mg/dL 0.56*   GLUCOSE mg/dL 92   CALCIUM mg/dL 8.9         Results from last 7 days   Lab Units 06/21/25  0720   SED RATE mm/hr 30*     Results from last 7 days   Lab Units 06/20/25  1725   CRP mg/dL 4.12*         Results from last 7 days   Lab Units 06/21/25  1023   CK TOTAL U/L 65         Estimated Creatinine Clearance: 106.7 mL/min (A) (by C-G formula based on SCr of 0.56 mg/dL (L)).      Microbiology:  No results found for: \"ACANTHNAEG\", \"AFBCX\", \"BPERTUSSISCX\", \"BLOODCX\"  No results found for: \"BCIDPCR\", \"CXREFLEX\", \"CSFCX\", \"CULTURETIS\"  No results found for: \"CULTURES\", \"HSVCX\", \"URCX\"  No results found for: \"EYECULTURE\", \"GCCX\", \"HSVCULTURE\", \"LABHSV\"  No results found for: \"LEGIONELLA\", \"MRSACX\", \"MUMPSCX\", \"MYCOPLASCX\"  No results found for: \"NOCARDIACX\", \"STOOLCX\"  No results found for: \"THROATCX\", \"UNSTIMCULT\", \"URINECX\", \"CULTURE\", \"VZVCULTUR\"  Wound Culture   Date Value Ref Range Status   06/20/2025 No growth  Preliminary           Radiology:  Imaging Results (Last 72 Hours)       ** No results found for the last 72 hours. **              Impression:   - Left ankle wound dehiscence/osteomyelitis- surgical incision dehiscence/exposed hardware- Corynebacterium on culture   - Left ankle fracture/ORIF 5/9/25  - s/p hardware removal/I & D left ankle  - " History of alcohol abuse   - COPD      PLAN/RECOMMENDATIONS:     Continue daptomycin  Continue  ceftriaxone 2 g IV daily  - follow cultures    Dr. Salmeron has obtained the history, performed the PE, formulated the above treatment plan   Dehiscence and exposure of hardware is concerning for hardware infection.    Patient was no systemically ill prior to the cast being taken down we need to consider coagulase-negative staph, Propionibacterium acne as potential pathogens      I have discussed the case with family at length patient is wanting to go home soon it may be an option to arrange for outpatient antibiotics through our office however we need to consider patient's strength, ability to transfer.  I will inquire from orthopedics if patient can be treated as an outpatient or if rehabilitation is required  This visit included the following complex service elements:  Complex medical decision-making associated with antimicrobial prescribing.  Counseled patients, family members, and/or caregivers regarding antimicrobial stewardship and antibiotic resistance.    No role for isolation currently     See orders  1)rocephin 2 gram iv daily x 6 weeks  1.5)daptomycin 500 mg iv daily x 6 weeks  2)weekly picc care  3)check cbc, cmp, esr, cpk q Monday  4)fax ID note to [facility] [id number]    Dc/cm time 60 minutes    Ok to go to [facility]     06/23/25  Talon Salmeron MD  6/23/2025  16:21 EDT

## 2025-06-23 NOTE — PLAN OF CARE
Goal Outcome Evaluation:  Plan of Care Reviewed With: patient        Progress: no change  Outcome Evaluation: Pt continues to present below his functional baseline with balance deficits, weakness, decreased endurance, and impaired safety awareness. Bed to chair transfer with max Ax2 and RW. Improved ability to maintain NWB status this session. Further IPPT is warrented. Pt is open to rehab after prolonged discussion of benefits. Rec IRF when medically appropriate.    Anticipated Discharge Disposition (PT): inpatient rehabilitation facility

## 2025-06-23 NOTE — THERAPY TREATMENT NOTE
Patient Name: Ernesto Vann  : 1945    MRN: [id number]                              Today's Date: 2025       Admit Date: 2025    Visit Dx:     ICD-10-CM ICD-9-CM   1. Infection of bone of left ankle  M86.9 730.97     Patient Active Problem List   Diagnosis    Chronic obstructive pulmonary disease    Essential hypertension    Alcohol abuse    History of tobacco abuse    DDD (degenerative disc disease), lumbar    Generalized anxiety disorder    Hyperlipidemia    PVD (peripheral vascular disease)    Neuropathy    Snoring    Dizziness    Fatigue    Weight loss    Hyponatremia    Prostate cancer    S/P prostatectomy, robotic assisted, radical with LNs    Raynaud's phenomenon without gangrene    Critical polytrauma    Hypokalemia    Orbital floor fracture    SAH (subarachnoid hemorrhage)    Seasonal allergies    Subdural hematoma    Syncope    Blurred vision    Irritable bowel syndrome with both constipation and diarrhea    Major depressive disorder, recurrent episode, moderate    Closed fracture of left ankle    Ankle fracture    S/P ORIF (open reduction internal fixation) fracture, left ankle, ORIF Synesmosis    Malignant neoplasm of prostate    Surgery follow-up    Surgical site infection    Postoperative wound infection, left ankle, status post incision and debridement, hardware removal, antibiotic matrix placement     Past Medical History:   Diagnosis Date    Alcohol abuse     Allergic     Anxiety     Arthritis     Benign colon polyp     Cancer prostate    PROSTATE CANCER    Cataract     Chronic obstructive pulmonary disease 2016    COPD (chronic obstructive pulmonary disease)     DDD (degenerative disc disease), lumbar     Depression     Diabetes mellitus     Dyslipidemia     Elevated PSA 2018    GERD (gastroesophageal reflux disease)     Head injury     Hyperlipidemia     Hypertension     Inguinal hernia     Irritable bowel syndrome with both constipation and diarrhea 05/15/2024     PAD (peripheral artery disease)     Peripheral neuropathy     Prostate cancer     SAH (subarachnoid hemorrhage) 06/30/2021    Tobacco abuse      Past Surgical History:   Procedure Laterality Date    ANKLE OPEN REDUCTION INTERNAL FIXATION Left 05/09/2025    Procedure: OPEN REDUCTION INTERNAL FIXATION OF LEFT ANKLE, SYNDESMOTIC FIXATION;  Surgeon: Oliver Trejo MD;  Location: [facility];  Service: Orthopedics;  Laterality: Left;    BELPHAROPTOSIS REPAIR Bilateral 09/01/2019    COLONOSCOPY      EYE SURGERY  Cataract    cataract    FEMORAL ARTERY - FEMORAL ARTERY BYPASS GRAFT      FEMORAL ARTERY STENT      INCISION AND DRAINAGE LEG Left 6/20/2025    Procedure: ANKLE INCISION AND DRAINAGE LEFT;  Surgeon: Oliver Trejo MD;  Location:  [facility];  Service: Orthopedics;  Laterality: Left;    INGUINAL HERNIA REPAIR Right     LUMBAR SYMPATHETIC NERVE BLOCK      PROSTATECTOMY N/A 11/22/2016    Procedure: PROSTATECTOMY LAPAROSCOPIC WITH [employer] ROBOT WITH NODES;  Surgeon: Victorino Emerson Jr., MD;  Location:  [facility];  Service:     TONSILLECTOMY      TONSILLECTOMY AND ADENOIDECTOMY      VASCULAR SURGERY      VASECTOMY      VEIN SURGERY        General Information       Row Name 06/23/25 1309          Physical Therapy Time and Intention    Document Type therapy note (daily note)  -AB     Mode of Treatment physical therapy  -AB       Row Name 06/23/25 1309          General Information    Patient Profile Reviewed yes  -AB     Existing Precautions/Restrictions fall;non-weight bearing;left;other (see comments)  NWB L LE IN CAM BOOT. PT IS S/P I&D L ANKLE WOUND AND REMOVAL OF ANKLE HARDWARE  -AB     Barriers to Rehab previous functional deficit;medically complex;ineffective coping  -AB       Row Name 06/23/25 1309          Cognition    Orientation Status (Cognition) oriented x 3  -AB       Row Name 06/23/25 1309          Safety Issues/Impairments Affecting Functional Mobility    Safety Issues Affecting Function (Mobility)  awareness of need for assistance;insight into deficits/self-awareness;safety precaution awareness;safety precautions follow-through/compliance;sequencing abilities;judgment  -AB     Impairments Affecting Function (Mobility) balance;coordination;endurance/activity tolerance;strength;pain;postural/trunk control  -AB               User Key  (r) = Recorded By, (t) = Taken By, (c) = Cosigned By      Initials Name Provider Type    AB Amber Owens, PT Physical Therapist                   Mobility       Row Name 06/23/25 1310          Bed Mobility    Bed Mobility supine-sit;scooting/bridging  -AB     Scooting/Bridging [place] (Bed Mobility) standby assist  -AB     Supine-Sit [place] (Bed Mobility) standby assist  -AB     Assistive Device (Bed Mobility) head of bed elevated;bed rails  -AB     Comment, (Bed Mobility) increased time/effort. Cam boot donned.  -AB       Row Name 06/23/25 1310          Transfers    Comment, (Transfers) Cues for hand placement and sequencing. Re-education provided on NWB status of LLE. Pt required max Ax2 for bed>chair transfer d/t instability. Improved ability to maintain NWB status noted this session.  -AB       Row Name 06/23/25 1310          Bed-Chair Transfer    Bed-Chair [place] (Transfers) maximum assist (25% patient effort);2 person assist  -AB     Assistive Device (Bed-Chair Transfers) walker, front-wheeled  -AB     Comment, (Bed-Chair Transfer) standing pivot transfer  -AB       Row Name 06/23/25 1310          Sit-Stand Transfer    Sit-Stand [place] (Transfers) moderate assist (50% patient effort);2 person assist;verbal cues;nonverbal cues (demo/gesture)  -AB     Assistive Device (Sit-Stand Transfers) walker, front-wheeled  -AB     Comment, (Sit-Stand Transfer) Boost to stand. Posterior lean noted.  -AB       Row Name 06/23/25 1310          Gait/Stairs (Locomotion)    [place] Level (Gait) not tested  -AB       Row Name 06/23/25 1310          Mobility     Extremity Weight-bearing Status left lower extremity  -AB     Left Lower Extremity (Weight-bearing Status) non weight-bearing (NWB)   -AB               User Key  (r) = Recorded By, (t) = Taken By, (c) = Cosigned By      Initials Name Provider Type    AB Amber Owens, PT Physical Therapist                   Obj/Interventions       Row Name 06/23/25 1313          Motor Skills    Therapeutic Exercise knee;hip  -AB       Row Name 06/23/25 1313          Hip (Therapeutic Exercise)    Hip (Therapeutic Exercise) isometric exercises  -AB     Hip Isometrics (Therapeutic Exercise) bilateral;gluteal sets;10 repetitions  -AB       Row Name 06/23/25 1313          Knee (Therapeutic Exercise)    Knee (Therapeutic Exercise) isometric exercises;strengthening exercise  -AB     Knee Isometrics (Therapeutic Exercise) bilateral;quad sets;10 repetitions  -AB     Knee Strengthening (Therapeutic Exercise) bilateral;SLR (straight leg raise);10 repetitions  -AB       Row Name 06/23/25 1313          Balance    Balance Assessment sitting static balance;sitting dynamic balance;standing dynamic balance;standing static balance  -AB     Static Sitting Balance standby assist  -AB     Dynamic Sitting Balance contact guard  -AB     Position, Sitting Balance unsupported;sitting edge of bed  -AB     Static Standing Balance moderate assist  -AB     Dynamic Standing Balance maximum assist;2-person assist;verbal cues;non-verbal cues (demo/gesture)  -AB     Position/Device Used, Standing Balance supported;walker, front-wheeled  -AB     Balance Interventions sitting;standing;sit to stand;supported;static;dynamic;occupation based/functional task  -AB     Comment, Balance Unsteady, requiring max A to correct.  -AB               User Key  (r) = Recorded By, (t) = Taken By, (c) = Cosigned By      Initials Name Provider Type    Amber Goldberg, PT Physical Therapist                   Goals/Plan    No documentation.                  Clinical Impression        Row Name 06/23/25 1314          Pain    Pretreatment Pain Rating 0/10 - no pain  -AB     Posttreatment Pain Rating 0/10 - no pain  -AB       Row Name 06/23/25 1314          Plan of Care Review    Plan of Care Reviewed With patient  -AB     Progress no change  -AB     Outcome Evaluation Pt continues to present below his functional baseline with balance deficits, weakness, decreased endurance, and impaired safety awareness. Bed to chair transfer with max Ax2 and RW. Improved ability to maintain NWB status this session. Further IPPT is warrented. Pt is open to rehab after prolonged discussion of benefits. Rec IRF when medically appropriate.  -AB       Row Name 06/23/25 1314          Vital Signs    Pre Systolic BP Rehab 143  -AB     Pre Treatment Diastolic BP 75  -AB     O2 Delivery Pre Treatment room air  -AB     O2 Delivery Intra Treatment room air  -AB     O2 Delivery Post Treatment room air  -AB     Pre Patient Position Supine  -AB     Intra Patient Position Standing  -AB     Post Patient Position Sitting  -AB       Row Name 06/23/25 1314          Positioning and Restraints    Pre-Treatment Position in bed  -AB     Post Treatment Position chair  -AB     In Chair notified nsg;reclined;sitting;call light within reach;encouraged to call for assist;exit alarm on;legs elevated;with family/caregiver;waffle cushion;on mechanical lift sling  -AB               User Key  (r) = Recorded By, (t) = Taken By, (c) = Cosigned By      Initials Name Provider Type    AB Amber Owens, PT Physical Therapist                   Outcome Measures       Row Name 06/23/25 1316          How much help from another person do you currently need...    Turning from your back to your side while in flat bed without using bedrails? 3  -AB     Moving from lying on back to sitting on the side of a flat bed without bedrails? 3  -AB     Moving to and from a bed to a chair (including a wheelchair)? 2  -AB     Standing up from a chair using your arms  (e.g., wheelchair, bedside chair)? 2  -AB     Climbing 3-5 steps with a railing? 1  -AB     To walk in hospital room? 1  -AB     AM-PAC 6 Clicks Score (PT) 12  -AB     Highest Level of Mobility Goal Move to Chair/Commode-4  -AB       Row Name 06/23/25 1316          Functional Assessment    Outcome Measure Options AM-PAC 6 Clicks Basic Mobility (PT)  -AB               User Key  (r) = Recorded By, (t) = Taken By, (c) = Cosigned By      Initials Name Provider Type    Amber Goldberg, PT Physical Therapist                                 Physical Therapy Education       Title: PT OT SLP Therapies (Done)       Topic: Physical Therapy (Done)       Point: Mobility training (Done)       Learning Progress Summary            Patient Acceptance, E,D, VU,NR by AB at 6/23/2025 1317    Acceptance, E, VU,NR by CD at 6/22/2025 1639    Comment: SEE FLOWSHEET    Acceptance, E, VU,NR by CD at 6/21/2025 1614    Comment: BENEFITS OF OOB ACTIVITY, SAFETY WITH MOBILITY, PROGRESSION OF POC, D/C PLANNING, GAIT TRAINING/TRANSFER TRAINING, NWB STATUS L LE.                      Point: Home exercise program (Done)       Learning Progress Summary            Patient Acceptance, E,D, VU,NR by AB at 6/23/2025 1317    Acceptance, E, VU,NR by CD at 6/22/2025 1639    Comment: SEE FLOWSHEET    Acceptance, E, VU,NR by CD at 6/21/2025 1614    Comment: BENEFITS OF OOB ACTIVITY, SAFETY WITH MOBILITY, PROGRESSION OF POC, D/C PLANNING, GAIT TRAINING/TRANSFER TRAINING, NWB STATUS L LE.                      Point: Body mechanics (Done)       Learning Progress Summary            Patient Acceptance, E,D, VU,NR by AB at 6/23/2025 1317    Acceptance, E, VU,NR by CD at 6/22/2025 1639    Comment: SEE FLOWSHEET    Acceptance, E, VU,NR by CD at 6/21/2025 1614    Comment: BENEFITS OF OOB ACTIVITY, SAFETY WITH MOBILITY, PROGRESSION OF POC, D/C PLANNING, GAIT TRAINING/TRANSFER TRAINING, NWB STATUS L LE.                      Point: Precautions (Done)       Learning  Progress Summary            Patient Acceptance, E,D, VU,NR by AB at 6/23/2025 1317    Acceptance, E, VU,NR by CD at 6/22/2025 1639    Comment: SEE FLOWSHEET    Acceptance, E, VU,NR by CD at 6/21/2025 1614    Comment: BENEFITS OF OOB ACTIVITY, SAFETY WITH MOBILITY, PROGRESSION OF POC, D/C PLANNING, GAIT TRAINING/TRANSFER TRAINING, NWB STATUS L LE.                                      User Key       Initials Effective Dates Name Provider Type Discipline    CD 02/03/23 -  Romelia Rodríguez PT Physical Therapist PT    AB 09/22/22 -  Amber Owens PT Physical Therapist PT                  PT Recommendation and Plan     Progress: no change  Outcome Evaluation: Pt continues to present below his functional baseline with balance deficits, weakness, decreased endurance, and impaired safety awareness. Bed to chair transfer with max Ax2 and RW. Improved ability to maintain NWB status this session. Further IPPT is warrented. Pt is open to rehab after prolonged discussion of benefits. Rec IRF when medically appropriate.     Time Calculation:         PT Charges       Row Name 06/23/25 1317             Time Calculation    Start Time 1132  -AB      PT Received On 06/23/25  -AB         Timed Charges    17176 - PT Therapeutic Exercise Minutes 8  -AB      27807 - PT Therapeutic Activity Minutes 15  -AB         Total Minutes    Timed Charges Total Minutes 23  -AB       Total Minutes 23  -AB                User Key  (r) = Recorded By, (t) = Taken By, (c) = Cosigned By      Initials Name Provider Type    AB Amber Owens, PT Physical Therapist                  Therapy Charges for Today       Code Description Service Date Service Provider Modifiers Qty    38768050110 HC PT THER PROC EA 15 MIN 6/23/2025 Amber Owens, PT GP 1    98500138173 HC PT THERAPEUTIC ACT EA 15 MIN 6/23/2025 Amber Owens, PT GP 1    81129541625 HC PT THER SUPP EA 15 MIN 6/23/2025 Amber Owens, PT GP 2            PT G-Codes  Outcome Measure Options: AM-PAC  6 Clicks Basic Mobility (PT)  AM-PAC 6 Clicks Score (PT): 12  AM-PAC 6 Clicks Score (OT): 16  PT Discharge Summary  Anticipated Discharge Disposition (PT): inpatient rehabilitation facility    Amber Owens, PT  6/23/2025

## 2025-06-23 NOTE — TELEPHONE ENCOUNTER
PATIENT IS CURRENTLY WAITING ON TRANSPORT TO GO TO [facility] FOR 7 TO 10 DAYS PATIENT WILL CALL BACK WHEN HE KNOWS HE WILL BE RELEASED

## 2025-06-23 NOTE — DISCHARGE PLACEMENT REQUEST
"Ernesto Vann \"Steve\" (79 y.o. Male)       Date of Birth   1945    Social Security Number       Address   [address] [place] [address]    Home Phone   [phone]    MRN   [id number]       Worship   None    Marital Status                               Admission Date   6/20/2025    Admission Type   Elective    Admitting Provider   Oliver Trejo MD    Attending Provider   Oliver Trejo MD    Department, Room/Bed   [facility] 3H, S381/1       Discharge Date       Discharge Disposition   Rehab Facility or Unit (DC - External)    Discharge Destination                                 Attending Provider: Oliver Trejo MD    Allergies: Diclofenac, Tofranil [Imipramine Hcl], Lisinopril    Isolation: None   Infection: None   Code Status: Prior    Ht: 177.8 cm (70\")   Wt: 69.4 kg (153 lb)    Admission Cmt: None   Principal Problem: Postoperative wound infection, left ankle, status post incision and debridement, hardware removal, antibiotic matrix placement [T81.49XA]                   Active Insurance as of 6/20/2025       Primary Coverage       Payor Plan Insurance Group Employer/Plan Group    MEDICARE MEDICARE A & B        Payor Plan Address Payor Plan Phone Number Payor Plan Fax Number Effective Dates    [address] [phone]  11/1/2011 - None Entered    [facility] [address]         Subscriber Name Subscriber Birth Date Member ID       ERNESTO VANN 1945 [id number]               Secondary Coverage       Payor Plan Insurance Group Employer/Plan Group    [facility] SUP [employer]        Payor Plan Address Payor Plan Phone Number Payor Plan Fax Number Effective Dates    [facility] [phone]  1/1/2016 - None Entered    [address]       [facility] [address]         Subscriber Name Subscriber Birth Date Member ID       ERNESTO VANN 1945 [id number]                     Emergency Contacts        (Rel.) Home Phone " Work Phone Mobile Phone    Merary Vann (Spouse) [phone] -- [phone]                   Discharge Summary        Sapna Aguilar MD at 25 1421          Patient Name: Ernesto Vann  MRN: [id number]  : 1945  DOS: 2025    Attending: Oliver Trejo MD    Primary Care Provider: Mali Galeana MD    Date of Admission:.2025 12:39 PM    Date of Discharge:  2025    Discharge Diagnosis:   Postoperative wound infection, left ankle, status post incision and debridement, hardware removal, antibiotic matrix placement    Chronic obstructive pulmonary disease    Essential hypertension    Generalized anxiety disorder    Hyperlipidemia    S/P ORIF (open reduction internal fixation) fracture, left ankle, ORIF Synesmosis    Surgical site infection  Chronic hyponatremia    Hospital Course    At admit:      Patient is a pleasant 79 y.o. male presented to Dr. Trejo's office for a follow-up postoperatively and was found to have dehisced wound with exposed hardware.     Patient is known to us from his hospitalization in May of this year when he had ORIF of his left ankle fracture.  He had a splint on since then and that was removed at the office by Dr. Trejo prior to patient being sent for surgery and admission.     He has no nausea or vomiting, no shortness of breath, no fever or chills.     He underwent incision and debridement with hardware removal and antibiotic matrix placement.  Tolerated surgery well and was admitted for further management.     No recent medical issues since his discharge on 5/10/2025    After admit:    Patient was provided pain medications as needed for pain control, multimodal approach. Had a single shot block as well.    Adjustments were made to pain medications to optimize postop pain management.     Risks and benefits of opiate medications discussed with patient.  Juan Jose report in chart was reviewed prior to discharge.    He was seen by PT and OT and inpatient rehab  was recommended.      Patient used an IS for atelectasis prophylaxis and aspirin along with mechanicals for DVT prophylaxis.    Home medications were resumed as appropriate, and labs were monitored and remained fairly stable.     He received IV antibiotics under the direction of Talon Salmeron MD with [facility], a PICC line was placed in expectation of several weeks of IV antibiotics.  Patient will be followed by ID during his admit at [facility].    With the progress he has made, patient is ready for discharge to [facility] today.      Keep dressing clean and dry until follow up appointment with Dr. Trejo.    Patient will remain nonweightbearing on the operative foot.    Discussed with patient regarding plan and shows shows understanding and agreement.     Procedures Performed    YOB: 1945     Date of Surgery:  6/20/2025     Pre-op Diagnosis:   Left ankle surgical site infection      Post-op Diagnosis:      Same     Procedure/CPT® Codes:  1.  Ankle deep hardware removal, 20680  2.  Ankle debridement of bone, area debrided was less than 20 cm², 27000   3.  Ankle placement of antibiotics impregnated matrix, 20700     Staff:  Surgeon(s):  Oliver Trejo MD        Anesthesia: Choice     Estimated Blood Loss: minimal     Specimen:          None     Complications:   None    Pertinent Test Results:    I reviewed the patient's new clinical results.   Results from last 7 days   Lab Units 06/22/25  0749 06/21/25  0720 06/20/25  1400   WBC 10*3/mm3 5.80 6.98  --    HEMOGLOBIN g/dL 11.9* 11.8*  --    HEMOGLOBIN, POC g/dL  --   --  13.9   HEMATOCRIT % 34.4* 33.9*  --    HEMATOCRIT POC %  --   --  41   PLATELETS 10*3/mm3 315 320  --      Results from last 7 days   Lab Units 06/22/25  0749 06/20/25  1725   SODIUM mmol/L 127* 125*   POTASSIUM mmol/L 3.6 3.9   CHLORIDE mmol/L 89* 89*   CO2 mmol/L 25.0 23.0   BUN mg/dL 7.5* 6.6*   CREATININE mg/dL 0.56* 0.54*  "  CALCIUM mg/dL 8.9 8.8   GLUCOSE mg/dL 92 125*       I reviewed the patient's new imaging including images and reports.      Physical therapy:   Date of Service: 25 1132  Creation Time: 25 1317     Signed         Goal Outcome Evaluation:  Plan of Care Reviewed With: patient  Progress: no change  Outcome Evaluation: Pt continues to present below his functional baseline with balance deficits, weakness, decreased endurance, and impaired safety awareness. Bed to chair transfer with max Ax2 and RW. Improved ability to maintain NWB status this session. Further IPPT is warrented. Pt is open to rehab after prolonged discussion of benefits. Rec IRF when medically appropriate.     Anticipated Discharge Disposition (PT): inpatient rehabilitation facility                Discharge Assessment:    Vital Signs  Visit Vitals  /75   Pulse 61   Temp 98 °F (36.7 °C) (Oral)   Resp 16   Ht 177.8 cm (70\")   Wt 69.4 kg (153 lb)   SpO2 98%   BMI 21.95 kg/m²     Temp (24hrs), Av.9 °F (36.6 °C), Min:97.7 °F (36.5 °C), Max:98 °F (36.7 °C)      General Appearance:    Alert, cooperative, in no acute distress   Lungs:     Clear to auscultation,respirations regular, even and unlabored    Heart:    Regular rhythm and normal rate, normal S1 and S2   Abdomen:     Normal bowel sounds, no masses, no organomegaly, soft non-tender, non-distended, no guarding, no rebound tenderness   Extremities: Clean dry and intact dressing on left foot/ankle.   Pulses:   Pulses palpable and equal bilaterally   Skin:   No bleeding, bruising or rash            Discharge Disposition: [facility]          Discharge Medications        New Medications        Instructions Start Date   cefTRIAXone 2,000 mg in sodium chloride 0.9 % 100 mL IVPB   2,000 mg, Intravenous, Every 24 Hours      DAPTOmycin 400 mg in sodium chloride 0.9 % 50 mL   6 mg/kg (400 mg), Intravenous, Every 24 Hours   Start Date: 2025     melatonin 5 MG tablet tablet   5 mg, Oral, " Nightly PRN      polyethylene glycol 17 g packet  Commonly known as: MIRALAX   17 g, Oral, Daily PRN      sennosides-docusate 8.6-50 MG per tablet  Commonly known as: PERICOLACE   2 tablets, Oral, 2 Times Daily             Continue These Medications        Instructions Start Date   acetaminophen 500 MG tablet  Commonly known as: TYLENOL   500 mg, Oral, Every 6 Hours PRN      amLODIPine 10 MG tablet  Commonly known as: NORVASC   10 mg, Oral, Daily      atorvastatin 20 MG tablet  Commonly known as: LIPITOR   20 mg, Oral, Daily      clopidogrel 75 MG tablet  Commonly known as: PLAVIX   TAKE 1 TABLET BY MOUTH ONCE DAILY      gabapentin 100 MG capsule  Commonly known as: NEURONTIN   1 capsule, Every 12 Hours Scheduled      metoprolol succinate  MG 24 hr tablet  Commonly known as: TOPROL-XL   200 mg, Oral, Daily      oxyCODONE 5 MG immediate release tablet  Commonly known as: Roxicodone   5 mg, Oral, Every 4 Hours PRN      QUEtiapine 50 MG tablet  Commonly known as: SEROquel   50 mg, Oral, Nightly      sertraline 100 MG tablet  Commonly known as: Zoloft   Take one 50 mg tablet by mouth daily for two weeks, then take one 100 mg tablet by mouth daily for two weeks, after four weeks, increase to one 50 mg tablet by mouth daily along with one 100 mg tablet by mouth daily to equal 150 mg by mouth daily      sertraline 50 MG tablet  Commonly known as: Zoloft   Take one 50 mg tablet by mouth daily for two weeks, then take one 100 mg tablet by mouth daily for two weeks, after four weeks, increase to one 50 mg tablet by mouth daily along with one 100 mg tablet by mouth daily to equal 150 mg by mouth daily      solifenacin 10 MG tablet  Commonly known as: VESICARE   1 tablet, Daily               Discharge Diet: Resume prior    Activity at Discharge: NWB LLE.    Follow-up Appointments  Dr. Trejo  per his orders      Sapna Aguilar MD  06/23/25  14:21 EDT      Electronically signed by Sapna Aguilar MD at 06/23/25  5220

## 2025-06-23 NOTE — PROGRESS NOTES
"          Orthopaedic Surgery Progress Note    LOS: 0 days   Patient Care Team:  Mali Galeana MD as PCP - General (Internal Medicine)  Victorino Emerson Jr., MD as Referring Physician (Urology)  Huey Zuleta MD as Consulting Physician (Radiation Oncology)  Lex Nicolas MD as Consulting Physician (Gastroenterology)  Veronica Vázquez APRN as Nurse Practitioner (Nurse Practitioner)  Siva France MD as Consulting Physician (Interventional Cardiology)  Catrachito Sena MD as Consulting Physician (Cardiothoracic Surgery)  3 Days Post-Op   Procedure(s):  LEFT ANKLE INCISION AND DRAINAGE  Subjective     Interval History:   Laying in bed.  Pain well-controlled.  Reports met with care management.  Had conversation about home versus rehab.  Has not gotten a PICC line yet.  No other complaints.    Objective     Vital Signs:  Temp (24hrs), Av.9 °F (36.6 °C), Min:97.7 °F (36.5 °C), Max:98 °F (36.7 °C)    /75   Pulse 61   Temp 98 °F (36.7 °C) (Oral)   Resp 16   Ht 177.8 cm (70\")   Wt 69.4 kg (153 lb)   SpO2 98%   BMI 21.95 kg/m²   Body mass index is 21.95 kg/m².    Labs:  Lab Results (last 24 hours)       Procedure Component Value Units Date/Time    Anaerobic Culture - Surgical Site, Ankle, Left [[id number]]  (Normal) Collected: 25 161    Specimen: Surgical Site from Ankle, Left Updated: 25 0807     Anaerobic Culture No anaerobes isolated at 3 days    Anaerobic Culture 10 Day Incubation - Bone, Ankle, Left [[id number]]  (Normal) Collected: 25 1616    Specimen: Bone from Ankle, Left Updated: 25 0802     Anaerobic Culture No anaerobes isolated at 5 days    Wound Culture - Surgical Site, Ankle, Left [[id number]] Collected: 25 1618    Specimen: Surgical Site from Ankle, Left Updated: 25 0705     Wound Culture No growth at 3 days     Gram Stain Rare (1+) WBCs per low power field      No epithelial cells seen      No organisms seen            Physical " Exam:  left LE: dressing removed for exam, SS drainage on dressing (less than previous),   Lateral ankle incision clean and intact with sutures in place   compartments soft/compressible leg   +wiggling toes   SILT in foot in all distributions   foot WWP    Assessment/Plan:  3 Days Post-Op status post:  Procedure(s):  LEFT ANKLE INCISION AND DRAINAGE    -NWB operative extremity in boot  -Advance diet as tolerated  -Keep dressing clean and dry, xeroform, 4x4s, ABD, secured with ACE wrap.   -DVT prophylaxis, mechanical and plavix  -Consult ID, appreciate recs, abx per ID  -Pain control  -PT/OT  -Follow-up Intra-Op cultures bone corynebacterium  -Elevate operative extremity  -Consult medicine appreciate recs  -Dispo pending    Oliver Trejo MD  06/23/25  11:02 EDT

## 2025-06-23 NOTE — CASE MANAGEMENT/SOCIAL WORK
Case Management Discharge Note      Final Note: Pt has been accepted to [facility] to their GR[facility] unit. He will transport via [facility]. PCS has been sent. Report can be called to [phone]. Pt in agreement with plan         Selected Continued Care - Admitted Since 6/20/2025       Destination Coordination complete.      Service Provider Services Address Phone Fax Patient Preferred    [facility] [address] [id number] [phone] --              Durable Medical Equipment    No services have been selected for the patient.                Dialysis/Infusion    No services have been selected for the patient.                Home Medical Care    No services have been selected for the patient.                Therapy    No services have been selected for the patient.                Community Resources    No services have been selected for the patient.                Community & DME    No services have been selected for the patient.                         Final Discharge Disposition Code: 62 - inpatient rehab facility

## 2025-06-24 NOTE — PLAN OF CARE
Goal Outcome Evaluation:      Discharged to [facility] via  EMS. Report called to Lesley. Single lumen PICC placed and in place at time of d/c. Rocephin abx not infused due to unknown EMS  time, and facility called and updated. PRN PO pain medication administered

## 2025-06-25 LAB — BACTERIA SPEC ANAEROBE CULT: NORMAL

## 2025-06-26 ENCOUNTER — TELEPHONE (OUTPATIENT)
Dept: NEUROLOGY | Facility: CLINIC | Age: 80
End: 2025-06-26
Payer: MEDICARE

## 2025-06-26 NOTE — TELEPHONE ENCOUNTER
TRIED TO CALL TO RESCHEDULE PATIENT, BUT THERE WAS A PROBLEM WITH THE NUMBER AND IT WOULD NOT ACCEPT CALLS - RESCHEDULED APPOINTMENT AS REQUESTED PREVIOUSLY    detailed exam

## 2025-06-27 LAB
FUNGUS WND CULT: NORMAL
FUNGUS WND CULT: NORMAL
MYCOBACTERIUM SPEC CULT: NORMAL
MYCOBACTERIUM SPEC CULT: NORMAL
NIGHT BLUE STAIN TISS: NORMAL
NIGHT BLUE STAIN TISS: NORMAL

## 2025-06-30 LAB — BACTERIA SPEC ANAEROBE CULT: NORMAL

## 2025-07-07 ENCOUNTER — LAB REQUISITION (OUTPATIENT)
Dept: LAB | Facility: HOSPITAL | Age: 80
End: 2025-07-07
Payer: MEDICARE

## 2025-07-07 DIAGNOSIS — M86.172 OTHER ACUTE OSTEOMYELITIS, LEFT ANKLE AND FOOT: ICD-10-CM

## 2025-07-07 LAB
ALBUMIN SERPL-MCNC: 4 G/DL (ref 3.5–5.2)
ALBUMIN/GLOB SERPL: 1.6 G/DL
ALP SERPL-CCNC: 82 U/L (ref 39–117)
ALT SERPL W P-5'-P-CCNC: 21 U/L (ref 1–41)
ANION GAP SERPL CALCULATED.3IONS-SCNC: 13.7 MMOL/L (ref 5–15)
AST SERPL-CCNC: 26 U/L (ref 1–40)
BASOPHILS # BLD AUTO: 0.05 10*3/MM3 (ref 0–0.2)
BASOPHILS NFR BLD AUTO: 0.8 % (ref 0–1.5)
BILIRUB SERPL-MCNC: 0.5 MG/DL (ref 0–1.2)
BUN SERPL-MCNC: 9.1 MG/DL (ref 8–23)
BUN/CREAT SERPL: 17.2 (ref 7–25)
CALCIUM SPEC-SCNC: 8.7 MG/DL (ref 8.6–10.5)
CHLORIDE SERPL-SCNC: 88 MMOL/L (ref 98–107)
CK SERPL-CCNC: 51 U/L (ref 20–200)
CO2 SERPL-SCNC: 21.3 MMOL/L (ref 22–29)
CREAT SERPL-MCNC: 0.53 MG/DL (ref 0.76–1.27)
CRP SERPL-MCNC: 9.59 MG/DL (ref 0–0.5)
DEPRECATED RDW RBC AUTO: 45.1 FL (ref 37–54)
EGFRCR SERPLBLD CKD-EPI 2021: 102.6 ML/MIN/1.73
EOSINOPHIL # BLD AUTO: 0.17 10*3/MM3 (ref 0–0.4)
EOSINOPHIL NFR BLD AUTO: 2.8 % (ref 0.3–6.2)
ERYTHROCYTE [DISTWIDTH] IN BLOOD BY AUTOMATED COUNT: 14.1 % (ref 12.3–15.4)
ERYTHROCYTE [SEDIMENTATION RATE] IN BLOOD: 21 MM/HR (ref 0–20)
GLOBULIN UR ELPH-MCNC: 2.5 GM/DL
GLUCOSE SERPL-MCNC: 100 MG/DL (ref 65–99)
HCT VFR BLD AUTO: 33.3 % (ref 37.5–51)
HGB BLD-MCNC: 11.6 G/DL (ref 13–17.7)
IMM GRANULOCYTES # BLD AUTO: 0.04 10*3/MM3 (ref 0–0.05)
IMM GRANULOCYTES NFR BLD AUTO: 0.7 % (ref 0–0.5)
LYMPHOCYTES # BLD AUTO: 0.37 10*3/MM3 (ref 0.7–3.1)
LYMPHOCYTES NFR BLD AUTO: 6.1 % (ref 19.6–45.3)
MCH RBC QN AUTO: 30.2 PG (ref 26.6–33)
MCHC RBC AUTO-ENTMCNC: 34.8 G/DL (ref 31.5–35.7)
MCV RBC AUTO: 86.7 FL (ref 79–97)
MONOCYTES # BLD AUTO: 0.58 10*3/MM3 (ref 0.1–0.9)
MONOCYTES NFR BLD AUTO: 9.6 % (ref 5–12)
NEUTROPHILS NFR BLD AUTO: 4.85 10*3/MM3 (ref 1.7–7)
NEUTROPHILS NFR BLD AUTO: 80 % (ref 42.7–76)
NRBC BLD AUTO-RTO: 0 /100 WBC (ref 0–0.2)
PLATELET # BLD AUTO: 210 10*3/MM3 (ref 140–450)
PMV BLD AUTO: 9.5 FL (ref 6–12)
POTASSIUM SERPL-SCNC: 4.2 MMOL/L (ref 3.5–5.2)
PROT SERPL-MCNC: 6.5 G/DL (ref 6–8.5)
RBC # BLD AUTO: 3.84 10*6/MM3 (ref 4.14–5.8)
SODIUM SERPL-SCNC: 123 MMOL/L (ref 136–145)
WBC NRBC COR # BLD AUTO: 6.06 10*3/MM3 (ref 3.4–10.8)

## 2025-07-07 PROCEDURE — 82550 ASSAY OF CK (CPK): CPT | Performed by: INTERNAL MEDICINE

## 2025-07-07 PROCEDURE — 80053 COMPREHEN METABOLIC PANEL: CPT | Performed by: INTERNAL MEDICINE

## 2025-07-07 PROCEDURE — 85652 RBC SED RATE AUTOMATED: CPT | Performed by: INTERNAL MEDICINE

## 2025-07-07 PROCEDURE — 86140 C-REACTIVE PROTEIN: CPT | Performed by: INTERNAL MEDICINE

## 2025-07-07 PROCEDURE — 85025 COMPLETE CBC W/AUTO DIFF WBC: CPT | Performed by: INTERNAL MEDICINE

## 2025-07-09 ENCOUNTER — OFFICE VISIT (OUTPATIENT)
Dept: INTERNAL MEDICINE | Facility: CLINIC | Age: 80
End: 2025-07-09
Payer: MEDICARE

## 2025-07-09 VITALS
BODY MASS INDEX: 21.9 KG/M2 | SYSTOLIC BLOOD PRESSURE: 114 MMHG | OXYGEN SATURATION: 95 % | WEIGHT: 153 LBS | HEIGHT: 70 IN | RESPIRATION RATE: 14 BRPM | HEART RATE: 76 BPM | DIASTOLIC BLOOD PRESSURE: 76 MMHG

## 2025-07-09 DIAGNOSIS — M86.9: ICD-10-CM

## 2025-07-09 DIAGNOSIS — Z76.89 ENCOUNTER FOR SUPPORT AND COORDINATION OF TRANSITION OF CARE: Primary | ICD-10-CM

## 2025-07-09 DIAGNOSIS — M86.172 OTHER ACUTE OSTEOMYELITIS OF LEFT ANKLE: ICD-10-CM

## 2025-07-09 DIAGNOSIS — E87.1 HYPONATREMIA: ICD-10-CM

## 2025-07-09 RX ORDER — HYDRALAZINE HYDROCHLORIDE 25 MG/1
25 TABLET, FILM COATED ORAL 2 TIMES DAILY
Status: ON HOLD | COMMUNITY
Start: 2025-07-01

## 2025-07-09 RX ORDER — ENOXAPARIN SODIUM 100 MG/ML
INJECTION SUBCUTANEOUS
Status: ON HOLD | COMMUNITY
Start: 2025-07-01 | End: 2025-07-24

## 2025-07-09 RX ORDER — DAPTOMYCIN 50 MG/ML
500 INJECTION, POWDER, LYOPHILIZED, FOR SOLUTION INTRAVENOUS DAILY
Status: ON HOLD | COMMUNITY
Start: 2025-07-08

## 2025-07-09 RX ORDER — CEFTRIAXONE 2 G/1
2 INJECTION, POWDER, FOR SOLUTION INTRAMUSCULAR; INTRAVENOUS EVERY 24 HOURS
Status: ON HOLD | COMMUNITY
Start: 2025-07-08

## 2025-07-09 NOTE — PATIENT INSTRUCTIONS
Diagnosis Discussed   Continue to monitor   Plenty of fluids  - increase fluid intake- electrolytes- Sodium tablets daily   Follow up with Endocrinology   Follow up with Orthopedics   Follow up with Neurology- keep scheduled CT Head   Home Health with PT/OT   If symptoms worsen or persist please seek further evaluation

## 2025-07-09 NOTE — PROGRESS NOTES
Transitional Care Follow Up Visit  Subjective     Ernesto Vann is a 79 y.o. male who presents for a transitional care management visit.    Within 48 business hours after discharge our office contacted him via telephone to coordinate his care and needs.      I reviewed and discussed the details of that call along with the discharge summary, hospital problems, inpatient lab results, inpatient diagnostic studies, and consultation reports with Ernesto.     Current outpatient and discharge medications have been reconciled for the patient.  Reviewed by: Mali Galeana MD           No data to display              Risk for Readmission (LACE) Score: 10 (7/24/2025  4:13 PM)      History of Present Illness   Course During Hospital Stay: 6/20/2025- 6/23/205- to [facility]- on 7/2/2025    presented to Dr. Trejo's office for a follow-up postoperatively and was found to have dehisced wound with exposed hardware.     Patient is known to us from his hospitalization in May of this year when he had ORIF of his left ankle fracture.  He had a splint on since then and that was removed at the office by Dr. Trejo prior to patient being sent for surgery and admission.    He underwent incision and debridement with hardware removal and antibiotic matrix placement. Tolerated surgery well and was admitted for further management.     He received IV antibiotics under the direction of Talon Salmeron MD with [facility], a PICC line was placed in expectation of several weeks of IV antibiotics. Patient will be followed by ID during his admit at [facility].     Currently on Rocephin and Daptomycin injections per ID until 8/1/2025  Lovenox injections daily   Blood work weekly at this point with Dr. Salmeron     Pain has greatly improved   Working with PT and OT with improvement     Still with continued decreased sodium levels- chronically low     Hyponatremia - chronic at this point   2 glasses of wine a  "night reduced significantly from previous     The following portions of the patient's history were reviewed and updated as appropriate: allergies, current medications, past family history, past medical history, past social history, past surgical history, and problem list.    Review of Systems   Constitutional:  Negative for chills and fever.   Respiratory:  Negative for cough and shortness of breath.    Cardiovascular:  Positive for leg swelling. Negative for chest pain and palpitations.   Gastrointestinal:  Negative for abdominal pain, nausea and vomiting.   Genitourinary:  Negative for dysuria.   Musculoskeletal:  Positive for gait problem.   Skin:  Positive for wound.   Neurological:  Negative for headaches.       Objective   /76   Pulse 76   Resp 14   Ht 177.8 cm (70\")   Wt 69.4 kg (153 lb)   SpO2 95%   BMI 21.95 kg/m²   Physical Exam  Vitals and nursing note reviewed.   Constitutional:       General: He is not in acute distress.     Appearance: Normal appearance.   HENT:      Head: Normocephalic and atraumatic.   Cardiovascular:      Rate and Rhythm: Normal rate and regular rhythm.      Heart sounds: Normal heart sounds.   Pulmonary:      Effort: Pulmonary effort is normal.      Breath sounds: Normal breath sounds.   Musculoskeletal:      Comments: Wheel chair- limited weight bearing   Left ankle- wrapped. Healing well per patient- pictures viewed.      Skin:     General: Skin is warm and dry.   Neurological:      Mental Status: He is alert.         Assessment & Plan   Diagnoses and all orders for this visit:  Diagnosis Discussed   Continue to monitor   Plenty of fluids  - increase fluid intake- electrolytes- Sodium tablets daily   Follow up with Endocrinology   Follow up with Orthopedics   Follow up with Neurology- keep scheduled CT Head   Home Health with PT/OT   If symptoms worsen or persist please seek further evaluation     1. Encounter for support and coordination of transition of care " (Primary)    2. Infection of bone    3. Other acute osteomyelitis of left ankle    4. Hyponatremia  -     Ambulatory Referral to Endocrinology  Persistent- Beer potomania? Volume depletion. Has reduced alcohol intake significantly. Will refer for further work up evaluation.

## 2025-07-14 ENCOUNTER — LAB (OUTPATIENT)
Dept: LAB | Facility: HOSPITAL | Age: 80
End: 2025-07-14
Payer: MEDICARE

## 2025-07-14 ENCOUNTER — TRANSCRIBE ORDERS (OUTPATIENT)
Dept: LAB | Facility: HOSPITAL | Age: 80
End: 2025-07-14
Payer: MEDICARE

## 2025-07-14 ENCOUNTER — OFFICE VISIT (OUTPATIENT)
Dept: BEHAVIORAL HEALTH | Facility: CLINIC | Age: 80
End: 2025-07-14
Payer: MEDICARE

## 2025-07-14 ENCOUNTER — OFFICE VISIT (OUTPATIENT)
Dept: ORTHOPEDIC SURGERY | Facility: CLINIC | Age: 80
End: 2025-07-14
Payer: MEDICARE

## 2025-07-14 VITALS
HEIGHT: 70 IN | OXYGEN SATURATION: 94 % | HEART RATE: 71 BPM | DIASTOLIC BLOOD PRESSURE: 76 MMHG | SYSTOLIC BLOOD PRESSURE: 124 MMHG | WEIGHT: 153 LBS | BODY MASS INDEX: 21.9 KG/M2

## 2025-07-14 DIAGNOSIS — Z09 SURGERY FOLLOW-UP: Primary | ICD-10-CM

## 2025-07-14 DIAGNOSIS — T81.31XD DISRUPTION OF CLOSURE OF CORNEA, SUBSEQUENT ENCOUNTER: ICD-10-CM

## 2025-07-14 DIAGNOSIS — F33.1 MAJOR DEPRESSIVE DISORDER, RECURRENT EPISODE, MODERATE: Primary | ICD-10-CM

## 2025-07-14 DIAGNOSIS — M86.9 INFLAMMATION OF BONE: ICD-10-CM

## 2025-07-14 DIAGNOSIS — T81.43XD INFECTION OF ORGAN OR ORGAN SPACE AFTER SURGERY, SUBSEQUENT ENCOUNTER: Primary | ICD-10-CM

## 2025-07-14 DIAGNOSIS — F41.1 GENERALIZED ANXIETY DISORDER: ICD-10-CM

## 2025-07-14 DIAGNOSIS — T81.43XD INFECTION OF ORGAN OR ORGAN SPACE AFTER SURGERY, SUBSEQUENT ENCOUNTER: ICD-10-CM

## 2025-07-14 LAB
ALBUMIN SERPL-MCNC: 3.6 G/DL (ref 3.5–5.2)
ALBUMIN/GLOB SERPL: 1.6 G/DL
ALP SERPL-CCNC: 81 U/L (ref 39–117)
ALT SERPL W P-5'-P-CCNC: 13 U/L (ref 1–41)
ANION GAP SERPL CALCULATED.3IONS-SCNC: 15 MMOL/L (ref 5–15)
AST SERPL-CCNC: 17 U/L (ref 1–40)
BILIRUB SERPL-MCNC: 0.4 MG/DL (ref 0–1.2)
BUN SERPL-MCNC: 7.7 MG/DL (ref 8–23)
BUN/CREAT SERPL: 16 (ref 7–25)
CALCIUM SPEC-SCNC: 8.4 MG/DL (ref 8.6–10.5)
CHLORIDE SERPL-SCNC: 88 MMOL/L (ref 98–107)
CK SERPL-CCNC: 46 U/L (ref 20–200)
CO2 SERPL-SCNC: 23 MMOL/L (ref 22–29)
CREAT SERPL-MCNC: 0.48 MG/DL (ref 0.76–1.27)
CRP SERPL-MCNC: 21.94 MG/DL (ref 0–0.5)
DEPRECATED RDW RBC AUTO: 44.5 FL (ref 37–54)
EGFRCR SERPLBLD CKD-EPI 2021: 105.7 ML/MIN/1.73
ERYTHROCYTE [DISTWIDTH] IN BLOOD BY AUTOMATED COUNT: 14 % (ref 12.3–15.4)
ERYTHROCYTE [SEDIMENTATION RATE] IN BLOOD: 45 MM/HR (ref 0–20)
GLOBULIN UR ELPH-MCNC: 2.3 GM/DL
GLUCOSE SERPL-MCNC: 86 MG/DL (ref 65–99)
HCT VFR BLD AUTO: 30.2 % (ref 37.5–51)
HGB BLD-MCNC: 10.4 G/DL (ref 13–17.7)
MCH RBC QN AUTO: 29.7 PG (ref 26.6–33)
MCHC RBC AUTO-ENTMCNC: 34.4 G/DL (ref 31.5–35.7)
MCV RBC AUTO: 86.3 FL (ref 79–97)
PLATELET # BLD AUTO: 348 10*3/MM3 (ref 140–450)
PMV BLD AUTO: 9.3 FL (ref 6–12)
POTASSIUM SERPL-SCNC: 4 MMOL/L (ref 3.5–5.2)
PROT SERPL-MCNC: 5.9 G/DL (ref 6–8.5)
RBC # BLD AUTO: 3.5 10*6/MM3 (ref 4.14–5.8)
SODIUM SERPL-SCNC: 126 MMOL/L (ref 136–145)
WBC NRBC COR # BLD AUTO: 6.67 10*3/MM3 (ref 3.4–10.8)

## 2025-07-14 PROCEDURE — 99214 OFFICE O/P EST MOD 30 MIN: CPT | Performed by: REGISTERED NURSE

## 2025-07-14 PROCEDURE — 86140 C-REACTIVE PROTEIN: CPT

## 2025-07-14 PROCEDURE — 1160F RVW MEDS BY RX/DR IN RCRD: CPT | Performed by: REGISTERED NURSE

## 2025-07-14 PROCEDURE — 36415 COLL VENOUS BLD VENIPUNCTURE: CPT

## 2025-07-14 PROCEDURE — 3078F DIAST BP <80 MM HG: CPT | Performed by: REGISTERED NURSE

## 2025-07-14 PROCEDURE — 3074F SYST BP LT 130 MM HG: CPT | Performed by: REGISTERED NURSE

## 2025-07-14 PROCEDURE — 1159F MED LIST DOCD IN RCRD: CPT | Performed by: REGISTERED NURSE

## 2025-07-14 PROCEDURE — 82550 ASSAY OF CK (CPK): CPT

## 2025-07-14 PROCEDURE — 85027 COMPLETE CBC AUTOMATED: CPT

## 2025-07-14 PROCEDURE — G2211 COMPLEX E/M VISIT ADD ON: HCPCS | Performed by: REGISTERED NURSE

## 2025-07-14 PROCEDURE — 85652 RBC SED RATE AUTOMATED: CPT

## 2025-07-14 PROCEDURE — 80053 COMPREHEN METABOLIC PANEL: CPT

## 2025-07-14 RX ORDER — SERTRALINE HYDROCHLORIDE 100 MG/1
100 TABLET, FILM COATED ORAL DAILY
Qty: 30 TABLET | Refills: 2 | Status: SHIPPED | OUTPATIENT
Start: 2025-07-14

## 2025-07-14 NOTE — PROGRESS NOTES
Follow Up Office Visit      Patient Name: Ernesto Vann  : 1945   MRN: [id number]     Referring Provider: Mali Galeana MD    Chief Complaint:      ICD-10-CM ICD-9-CM   1. Major depressive disorder, recurrent episode, moderate  F33.1 296.32   2. Generalized anxiety disorder  F41.1 300.02        History of Present Illness:   Ernesto Vann is a 79 y.o. male who is here today for follow up and medication management    Subjective      Patient Reports:   History of Present Illness  The patient presents for evaluation of depression and insomnia. He is accompanied by his wife.    He has been on sertraline 150 mg for the past 2 to 3 months but reports no improvement in his depressive symptoms. He does not endorse any suicidal ideation or thoughts of harming others. He also does not experience hallucinations or delusions. Alcohol consumption has decreased to 2 glasses of wine daily, typically consumed in the late afternoon or early evening. He had previously abstained from alcohol for a period. Therapy with [facility] was discontinued as it was not found beneficial.     Issues with his eye have been present since 2024. An appointment with Dr. Reno is scheduled to investigate this further.    Seroquel 50 mg was prescribed by his PCP for sleep disturbances. He typically sleeps from 7 PM to 7 AM, with occasional wakefulness at night. He also tends to doze off in his chair during the day. A decrease in appetite was reported, but it has since returned to normal. Weight remains stable at 156.6 pounds.    A long-standing issue with low sodium levels is being addressed through a referral to an endocrinologist. He has not been diligent in attempting to maintain his electrolyte balance. He recently fell when getting up at night and broke his ankle. After the cast came off, he had a wound that required surgery. He has been non-weightbearing for nearly two months due to this complication. He spent 10 days at  [facility] for rehabilitation. 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 He and his wife believe this has contributed to his symptoms of depression.    He reports no improvement in depressive symptoms despite taking sertraline regularly. No suicidal thoughts or thoughts of harming others are present. No hallucinations or delusions are reported. Alcohol consumption has decreased, and sleep disturbances are managed with Seroquel.    He drinks 2 glasses of wine daily, typically in the late afternoon or early evening.    Depression score remains high.      Review of Systems:   Review of Systems   Constitutional:  Negative for unexpected weight change.   Eyes:  Negative for visual disturbance.   Respiratory:  Negative for chest tightness and shortness of breath.    Cardiovascular:  Negative for chest pain.   Musculoskeletal:  Negative for gait problem.   Skin:  Negative for rash and wound.   Neurological:  Negative for dizziness, tremors, seizures, weakness and light-headedness.   Psychiatric/Behavioral:  Positive for dysphoric mood and sleep disturbance. Negative for agitation, behavioral problems, confusion, hallucinations, self-injury and suicidal ideas. The patient is not nervous/anxious and is not hyperactive.      Sleep pattern: 12 hours, doses throughout the day  Appetite: no weight changes     PHQ-9 Depression Screening  Little interest or pleasure in doing things? Almost all   Feeling down, depressed, or hopeless? Almost all   PHQ-2 Total Score 6   Trouble falling or staying asleep, or sleeping too much? Not at all   Feeling tired or having little energy? Almost all   Poor appetite or overeating? Over half   Feeling bad about yourself - or that you are a failure or have let yourself or your family down? Not at all   Trouble concentrating on things, such as reading the newspaper or watching television? Not at all   Moving  or speaking so slowly that other people could have noticed? Or the opposite - being so fidgety or restless that you have been moving around a lot more than usual? Over half   Thoughts that you would be better off dead, or of hurting yourself in some way? Not at all   PHQ-9 Total Score 13   If you checked off any problems, how difficult have these problems made it for you to do your work, take care of things at home, or get along with other people? Very difficult       CRIS-7 Anxiety Screening  Over the last two weeks, how often have you been bothered by the following problems?  Feeling nervous, anxious or on edge: Not at all  Not being able to stop or control worrying: Not at all  Worrying too much about different things: Not at all  Trouble Relaxing: Not at all  Being so restless that it is hard to sit still: Not at all  Becoming easily annoyed or irritable: Not at all  Feeling afraid as if something awful might happen: Not at all  CRIS 7 Total Score: 0  If you checked any problems, how difficult have these problems made it for you to do your work, take care of things at home, or get along with other people: Not difficult at all    RISK ASSESSMENT:  Patient denies any thoughts or intent of suicide today. Patient denies any impulsive behavior today.     Medications:     Current Outpatient Medications:     acetaminophen (TYLENOL) 500 MG tablet, Take 1 tablet by mouth Every 6 (Six) Hours As Needed for Mild Pain., Disp: 30 tablet, Rfl: 0    amLODIPine (NORVASC) 10 MG tablet, Take 1 tablet by mouth Daily., Disp: 90 tablet, Rfl: 1    atorvastatin (LIPITOR) 20 MG tablet, Take 1 tablet by mouth Daily., Disp: 90 tablet, Rfl: 1    cefTRIAXone (ROCEPHIN) 2 g injection, Infuse 2 g into a venous catheter Daily., Disp: , Rfl:     clopidogrel (PLAVIX) 75 MG tablet, TAKE 1 TABLET BY MOUTH ONCE DAILY, Disp: 90 tablet, Rfl: 1    DAPTOmycin (CUBICIN) 500 MG injection, , Disp: , Rfl:     enoxaparin sodium (LOVENOX) 30 MG/0.3ML solution  "prefilled syringe syringe, , Disp: , Rfl:     gabapentin (NEURONTIN) 100 MG capsule, Take 1 capsule by mouth Every 12 (Twelve) Hours., Disp: , Rfl:     hydrALAZINE (APRESOLINE) 25 MG tablet, , Disp: , Rfl:     melatonin 5 MG tablet tablet, Take 1 tablet by mouth At Night As Needed (sleep)., Disp: , Rfl:     metoprolol succinate XL (TOPROL-XL) 100 MG 24 hr tablet, Take 2 tablets by mouth Daily., Disp: 180 tablet, Rfl: 1    oxyCODONE (Roxicodone) 5 MG immediate release tablet, Take 1 tablet by mouth Every 4 (Four) Hours As Needed (pain)., Disp: 40 tablet, Rfl: 0    polyethylene glycol (MIRALAX) 17 g packet, Take 17 g by mouth Daily As Needed (Use if senna-docusate is ineffective)., Disp: , Rfl:     QUEtiapine (SEROquel) 50 MG tablet, Take 1 tablet by mouth Every Night., Disp: 90 tablet, Rfl: 1    sennosides-docusate (PERICOLACE) 8.6-50 MG per tablet, Take 2 tablets by mouth 2 (Two) Times a Day., Disp: , Rfl:     sertraline (Zoloft) 100 MG tablet, Take 1 tablet by mouth Daily., Disp: 30 tablet, Rfl: 2    solifenacin (VESICARE) 10 MG tablet, Take 1 tablet by mouth Daily., Disp: , Rfl:     Medication Considerations:  ASIA reviewed and appropriate.      Allergies:   Allergies   Allergen Reactions    Diclofenac GI Bleeding and Unknown (See Comments)    Tofranil [Imipramine Hcl] Other (See Comments)     Syncope, falls    Lisinopril Cough       Results Reviewed:   Results  Discussed most recent lab results including low sodium    Objective     Physical Exam:  Vital Signs:   Vitals:    07/14/25 1034 07/14/25 1035 07/14/25 1036   BP:   124/76   Pulse:   71   SpO2:   94%   Weight:  69.4 kg (153 lb)    Height: 177.8 cm (70\")       Body mass index is 21.95 kg/m².     Mental Status Exam:   Hygiene:   fair   Cooperation:  Cooperative  Eye Contact:  Fair  Psychomotor Behavior:  Slow  Affect:  Appropriate  Mood: depressed  Speech:  Normal  Thought Process:  Goal directed and Linear  Thought Content:  Normal  Suicidal:  " None  Homicidal:  None  Hallucinations:  None  Delusion:  None  Memory:  Intact  Orientation:  Person, Place, Time, and Situation  Reliability:  fair  Insight:  Fair  Judgement:  Fair  Impulse Control:  Fair  Physical/Medical Issues:  see problem list     Assessment / Plan      Visit Diagnosis/Orders Placed This Visit:  Diagnoses and all orders for this visit:    1. Major depressive disorder, recurrent episode, moderate (Primary)  -     sertraline (Zoloft) 100 MG tablet; Take 1 tablet by mouth Daily.  Dispense: 30 tablet; Refill: 2    2. Generalized anxiety disorder  -     sertraline (Zoloft) 100 MG tablet; Take 1 tablet by mouth Daily.  Dispense: 30 tablet; Refill: 2        Functional Status: Moderate impairment     Prognosis: Guarded with Ongoing Treatment    Impression/Formulation:  Patient appeared alert and oriented.  Patient is voluntarily requesting to continue outpatient psychiatric treatment at [facility].  Patient is receptive to assistance with maintaining a stable lifestyle.  Patient presents with history of     ICD-10-CM ICD-9-CM   1. Major depressive disorder, recurrent episode, moderate  F33.1 296.32   2. Generalized anxiety disorder  F41.1 300.02   .    Reviewed patient's previous provider notes. Reviewed most recent labs. Patient meets DSM V diagnostic criteria for diagnoses. Diagnoses may be updated as more information becomes available.     Assessment & Plan  The patient discussed his recent fall and subsequent injuries, including a broken bone and ongoing issues with his eye. The conversation also covered his current medication regimen, including sertraline and Seroquel, and their effects on his symptoms. The patient reported no improvement in depressive symptoms despite taking sertraline for several months. Alcohol consumption and its impact on sleep were also addressed. The patient expressed frustration with his current health status and the prolonged recovery period  following surgery.    The patient continues to experience significant depressive symptoms despite being on sertraline 150 mg. There is no reported improvement in mood, and the patient denies any suicidal ideation or thoughts of harming others. The patient's sodium levels remain a concern, potentially exacerbated by the sertraline. Sleep quality is reportedly improved with Seroquel, but alcohol consumption may be contributing to restless sleep. The patient is also dealing with unresolved eye issues and has an upcoming appointment with a specialist.    Therapeutic Intervention:  - Reduction of sertraline dosage from 150 mg to 100 mg to address potential hyponatremia.  - Recommendation to try Liquid I.V. to help manage sodium levels.  - Advice to avoid alcohol, particularly in the evenings, to improve sleep quality.    Plan:  - Reduce sertraline dosage to 100 mg.  - Continue taking Seroquel 50 mg for sleep.  - Try Liquid I.V. for sodium management.  - Avoid alcohol, especially in the evenings.  - Continue regular blood work every Monday through the end of July 2025.    Follow-up:  - Follow-up appointment scheduled for 4 weeks from now.      Patient will continue supportive psychotherapy efforts and medications as indicated. Clinic will obtain release of information for current treatment team for continuity of care as needed. Patient will contact this office, call [phone] or present to the nearest emergency room should suicidal or homicidal ideations occur.  Discussed medication options and treatment plan of prescribed medication(s) as well as the risks, benefits, and potential side effects. Patient acknowledged and verbally consented to continue with current treatment plan and was educated on the importance of compliance with treatment and follow-up appointments.     Patient instructions:  Medication risks and side effects discussed with patient including risk for worsening mood, changes in behavior, thoughts of suicide or  homicide, induction of laina, serotonin syndrome, rare hyponatremia, rare SIADH, withdrawal symptoms if abrupt withdrawal, sexual dysfunction.   If any thoughts of SI or HI, worsening mood or changes in behavior, call 911 or crisis line [phone], or go to nearest ER at once.  Pt.verbalizes understanding and consents to treatment with this medication.         Patient or patient representative verbalized consent for the use of Ambient Listening during the visit with  LUCY Newberry for chart documentation. 7/14/2025  14:54 EDT    LUCY Newberry, PMHNP-BC [facility]

## 2025-07-14 NOTE — PROGRESS NOTES
[facility] Follow Up     Office Follow Up Visit     Date: 07/14/2025   Patient Name: Ernesto Vann  MRN: [id number]  YOB: 1945  Chief Complaint:   Chief Complaint   Patient presents with    Post-op     3 week S/P Ankle Incision And Drainage Left - Left (DOS 6/20/25)     History of Present Illness:   Ernesto Vann is a 79 y.o. male who is here today for follow-up status post left ankle I&D with hardware removal and placement of antibiotic impregnated calcium sulfate.  Was staying at [facility] and is now returned home.  Is seeing infectious disease and continues to take antibiotics.  Has been weightbearing very short distances in boot.  Denies any drainage on dressings.  Denies fevers chills or feeling ill.  No new complaints.    Subjective   I reviewed the patient's chief complaint, history of present illness, review of systems, past medical history, surgical history, family history, social history, medications and allergy list   Objective    Vital Signs: There were no vitals filed for this visit.  There is no height or weight on file to calculate BMI.    Ortho Exam:  left LE Foot and Ankle Exam:   Boot removed for exam.  Leg compartments soft and compressible.  Incision clean dry and intact laterally, sutures in place, no drainage or erythema.  Small eschar over distal aspect of incision with no signs of infection.  Medial incisions well-healed. able to actively plantarflex and dorsiflex ankle and all toes.  Appropriate swelling for this stage of healing about the foot and ankle.  Toes warm and well-perfused.  Brisk cap refill in all toes.     Results Review:  No new imaging    Assessment / Plan    Assessment/Plan:   Diagnoses and all orders for this visit:    1. Surgery follow-up (Primary)      Here today for follow-up status post ankle I&D with hardware removal and placement of antibiotic impregnated calcium sulfate.  Sutures removed in  clinic today and Steri-Strips placed.  Patient can now weight-bear as tolerated in tall cam walking boot.  Continue antibiotics per infectious disease recommendations.  Recommend daily dressing changes continue to keep close eye on surgical site.  No new signs of infection.  And very happy with how he is progressing.  Will plan to see him back in 2 weeks for reevaluation and likely transitioning out of boot and becoming more aggressive with physical therapy.  Counseled patient to contact the clinic sooner should any concerns arise.  And happy with his progress.  Was a pleasure seeing him today.    Follow Up:   Return in about 2 weeks (around 7/28/2025) for Recheck.      Oliver Trejo MD  [facility] [place] [facility]

## 2025-07-16 ENCOUNTER — OUTSIDE FACILITY SERVICE (OUTPATIENT)
Dept: INTERNAL MEDICINE | Facility: CLINIC | Age: 80
End: 2025-07-16
Payer: MEDICARE

## 2025-07-16 ENCOUNTER — TELEPHONE (OUTPATIENT)
Dept: INTERNAL MEDICINE | Facility: CLINIC | Age: 80
End: 2025-07-16
Payer: MEDICARE

## 2025-07-16 PROCEDURE — G0180 MD CERTIFICATION HHA PATIENT: HCPCS | Performed by: FAMILY MEDICINE

## 2025-07-16 NOTE — TELEPHONE ENCOUNTER
ADDEND PREVIOUS NOTE: NASIM WASN'T AWARE OF WHAT THE MEDICATION WAS NAMED SO THAT IS WHY IT ISN'T NOTATED

## 2025-07-16 NOTE — TELEPHONE ENCOUNTER
ANSIM FROM [facility] CALLED AND STATED THAT SHE SPOKE TO PATIENT'S WIFE.  SHE CALLED IN A PANIC BC AFTER SHE ADMINISTERS THE ANTIBIOTIC SHE CAN'T GET IT TO FLUSH THROUGH THE PICK LINE.  THERE IS A NURSE ON THEIR WAY OUT TO SEE PATIENT AND NASIM HAD REQUESTED THIS BE DOCUMENTED.      NASIM: [phone] IF ANY QUESTIONS

## 2025-07-17 DIAGNOSIS — E87.1 HYPONATREMIA: Primary | ICD-10-CM

## 2025-07-21 ENCOUNTER — LAB REQUISITION (OUTPATIENT)
Dept: LAB | Facility: HOSPITAL | Age: 80
DRG: 193 | End: 2025-07-21
Payer: MEDICARE

## 2025-07-21 DIAGNOSIS — G62.9 NEUROPATHY: Primary | ICD-10-CM

## 2025-07-21 PROCEDURE — 80053 COMPREHEN METABOLIC PANEL: CPT | Performed by: INTERNAL MEDICINE

## 2025-07-21 PROCEDURE — 82550 ASSAY OF CK (CPK): CPT | Performed by: INTERNAL MEDICINE

## 2025-07-21 PROCEDURE — 85027 COMPLETE CBC AUTOMATED: CPT | Performed by: INTERNAL MEDICINE

## 2025-07-21 PROCEDURE — 85652 RBC SED RATE AUTOMATED: CPT | Performed by: INTERNAL MEDICINE

## 2025-07-21 RX ORDER — GABAPENTIN 100 MG/1
100 CAPSULE ORAL EVERY 12 HOURS SCHEDULED
Qty: 60 CAPSULE | Refills: 2 | Status: ON HOLD | OUTPATIENT
Start: 2025-07-21

## 2025-07-21 NOTE — TELEPHONE ENCOUNTER
Last appointment: 7/9/2025  Next appointment: 10/17/2025     UDS:  CSC:     Last Refill: 2/3/2025 quantity of 60 with 1 refill. Medication d/c stating therapy completed.

## 2025-07-23 ENCOUNTER — HOSPITAL ENCOUNTER (OUTPATIENT)
Dept: CT IMAGING | Facility: HOSPITAL | Age: 80
Discharge: HOME | End: 2025-07-23
Admitting: FAMILY MEDICINE
Payer: MEDICARE

## 2025-07-23 DIAGNOSIS — H02.402 DROOPING EYELID, LEFT: ICD-10-CM

## 2025-07-23 PROCEDURE — 70450 CT HEAD/BRAIN W/O DYE: CPT

## 2025-07-24 ENCOUNTER — APPOINTMENT (OUTPATIENT)
Dept: CT IMAGING | Facility: HOSPITAL | Age: 80
DRG: 193 | End: 2025-07-24
Payer: MEDICARE

## 2025-07-24 ENCOUNTER — TELEPHONE (OUTPATIENT)
Dept: INTERNAL MEDICINE | Facility: CLINIC | Age: 80
End: 2025-07-24
Payer: MEDICARE

## 2025-07-24 ENCOUNTER — HOSPITAL ENCOUNTER (INPATIENT)
Facility: HOSPITAL | Age: 80
LOS: 20 days | Discharge: NURSING, REHAB OR LONG-TERM CARE | DRG: 193 | End: 2025-08-13
Attending: EMERGENCY MEDICINE | Admitting: INTERNAL MEDICINE
Payer: MEDICARE

## 2025-07-24 PROBLEM — J18.9 PNEUMONIA: Status: ACTIVE | Noted: 2025-07-24

## 2025-07-24 NOTE — TELEPHONE ENCOUNTER
Spoke w/ wife, states pt hasn't been eating/drinking much in last week, states sxs as mentioned, did let know to take pt to ED asap. Wife verbalized understanding.

## 2025-07-24 NOTE — TELEPHONE ENCOUNTER
Caller: Merary Vann    Relationship to patient: Emergency Contact    Best call back number:      [phone] (Mobile)       Chief complaint: MERARY SAID THE PATIENT IS HALLUCINATING AND SEEING THINGS THAT ARE NOT THERE   HE IS ALSO SAYING THINGS THAT DON'T MAKE SENSE     Patient directed to call 911 or go to their nearest emergency room.     Patient verbalized understanding: [x] Yes  [] No  If no, why?    Additional notes: MERARY SAID HOME HEALTH IS NOT SCHEDULED AGAIN TO SEE HIM FOR A FEW DAYS   SHE SAID SHE WILL TRY AND GET HIM TO THE ER   ADVISED MERARY TO CALL EMT IF NEEDED

## 2025-07-25 ENCOUNTER — APPOINTMENT (OUTPATIENT)
Dept: GENERAL RADIOLOGY | Facility: HOSPITAL | Age: 80
DRG: 193 | End: 2025-07-25
Payer: MEDICARE

## 2025-07-26 ENCOUNTER — ANCILLARY PROCEDURE (OUTPATIENT)
Dept: SPEECH THERAPY | Facility: HOSPITAL | Age: 80
DRG: 193 | End: 2025-07-26
Payer: MEDICARE

## 2025-07-26 PROBLEM — E43 SEVERE PROTEIN-CALORIE MALNUTRITION: Status: ACTIVE | Noted: 2025-07-26

## 2025-07-28 ENCOUNTER — APPOINTMENT (OUTPATIENT)
Dept: GENERAL RADIOLOGY | Facility: HOSPITAL | Age: 80
DRG: 193 | End: 2025-07-28
Payer: MEDICARE

## 2025-07-29 ENCOUNTER — APPOINTMENT (OUTPATIENT)
Dept: GENERAL RADIOLOGY | Facility: HOSPITAL | Age: 80
DRG: 193 | End: 2025-07-29
Payer: MEDICARE

## 2025-07-29 ENCOUNTER — ANCILLARY PROCEDURE (OUTPATIENT)
Dept: SPEECH THERAPY | Facility: HOSPITAL | Age: 80
DRG: 193 | End: 2025-07-29
Payer: MEDICARE

## 2025-07-31 ENCOUNTER — TELEPHONE (OUTPATIENT)
Dept: ORTHOPEDIC SURGERY | Facility: CLINIC | Age: 80
End: 2025-07-31
Payer: MEDICARE

## 2025-07-31 NOTE — TELEPHONE ENCOUNTER
I called the patient's wife back and left her a detailed message stating that her  may weight bear as tolerated in the boot.     Lesley Dejesus MA

## 2025-07-31 NOTE — TELEPHONE ENCOUNTER
Physical Therapy Daily Treatment    Visit Count: 3    Precautions: No known precautions for lymphedema therapy     SUBJECTIVE   No new complaints   Current Pain (0-10 scale): 0   Functional Change: no functional change reported.     OBJECTIVE   Lymphedema Circumference (cm): Lower Extremity  Date 10/20/20 10/29/20 11/2/20 10/20/20 10/29/20 11/2/20   Landmarks   left Left Left right Right Right   40 cm 47 48 50 46 45 45.5   30 cm 38 37 40 39.5 38 39   20 cm 39 36 37 36.5 36.5 35   10 cm 29 27 27.5 28.5 28 29   malleolus 27 28 25 29 26.5 26   ankle 32.5 32 31.5 32 29 31   arch 26 26 26.3 26 26.5 25   Total 238.5 234 237.3 237.5 229.5 230.5   % loss/gain             Treatment   Manual Therapy:   Manual edema massage: pump points to cysterna chyle, groin, lateral thigh bundle, medial thigh bundle, popliteal fossa, clear and flow both lower extremities below knees    Therapeutic Exercise:  Teaching of self drainage technique, short form to popliteal fossa, patient instructed to complete twice a day as tolerated.     Thera act:   Lymphedema wraps completed for both lower extremities consisting of:  Size F tubular bandage, rosidal, 6 and 10 cm short stretch wraps bilaterally. Patient instructed to remove with s/s of increased pain, nunbness /tingling or if wraps slide down and bunch up. Otherwise, okay to wear until Sunday, remove for laundering ( laundering instructions provided) and return to clinic with wraps. Patient also instructed to keep size F tubular bandages on as needed.       Skilled input: verbal instruction/cues, tactile instruction/cues, education/instruction on wraps, self drainage technique    Home Program:   Pump point techniques  Wraps as instructed ( see above     Writer verbally educated the patient and received verbal consent from the patient on hand placement, positioning of patient, and techniques to be performed today including clothing adjustments for techniques, therapist position for techniques,  Wife called on behalf of the patient and she says he is in the hospital at the moment due to pneumonia. The nurse would like to know of his ability to walk and any other restrictions     Please advise.    hand placement and palpation for techniques as described above and how they are pertinent to the patient's plan of care.      Suggestions for next session as indicated: progress per plan of care, initiate teaching for lower quarter lymphatic exercise.s     ASSESSMENT   Patient reports good tolerance to wraps applied today. Right lower extremity girth shows improvement, left remains minimally changed.   Pain after treatment (patient reported, 0-10 scale): no pain reported   Result of above outlined education: Verbalizes understanding, Demonstrates understanding and Needs reinforcement    Procedures and total treatment time documented in Time Entry flowsheet.

## 2025-08-01 NOTE — TELEPHONE ENCOUNTER
I called back and spoke to the wife who stated she received my voicemail and verbalized understanding of the directions given by Dr. Trejo.    Lesley Dejesus MA

## 2025-08-02 ENCOUNTER — APPOINTMENT (OUTPATIENT)
Dept: GENERAL RADIOLOGY | Facility: HOSPITAL | Age: 80
DRG: 193 | End: 2025-08-02
Payer: MEDICARE

## 2025-08-04 ENCOUNTER — ANCILLARY PROCEDURE (OUTPATIENT)
Dept: SPEECH THERAPY | Facility: HOSPITAL | Age: 80
DRG: 193 | End: 2025-08-04
Payer: MEDICARE

## 2025-08-05 ENCOUNTER — APPOINTMENT (OUTPATIENT)
Dept: GENERAL RADIOLOGY | Facility: HOSPITAL | Age: 80
DRG: 193 | End: 2025-08-05
Payer: MEDICARE

## 2025-08-06 ENCOUNTER — APPOINTMENT (OUTPATIENT)
Dept: GENERAL RADIOLOGY | Facility: HOSPITAL | Age: 80
DRG: 193 | End: 2025-08-06
Payer: MEDICARE

## 2025-08-08 ENCOUNTER — TELEPHONE (OUTPATIENT)
Dept: ORTHOPEDIC SURGERY | Facility: CLINIC | Age: 80
End: 2025-08-08
Payer: MEDICARE

## 2025-08-08 ENCOUNTER — APPOINTMENT (OUTPATIENT)
Dept: GENERAL RADIOLOGY | Facility: HOSPITAL | Age: 80
DRG: 193 | End: 2025-08-08
Payer: MEDICARE

## 2025-08-11 ENCOUNTER — APPOINTMENT (OUTPATIENT)
Dept: NEUROLOGY | Facility: HOSPITAL | Age: 80
DRG: 193 | End: 2025-08-11
Payer: MEDICARE

## 2025-08-11 ENCOUNTER — APPOINTMENT (OUTPATIENT)
Dept: MRI IMAGING | Facility: HOSPITAL | Age: 80
DRG: 193 | End: 2025-08-11
Payer: MEDICARE

## 2025-08-11 ENCOUNTER — APPOINTMENT (OUTPATIENT)
Dept: GENERAL RADIOLOGY | Facility: HOSPITAL | Age: 80
DRG: 193 | End: 2025-08-11
Payer: MEDICARE

## 2025-08-28 ENCOUNTER — OFFICE VISIT (OUTPATIENT)
Dept: NEUROLOGY | Facility: CLINIC | Age: 80
End: 2025-08-28
Payer: MEDICARE

## 2025-08-28 VITALS
HEIGHT: 70 IN | DIASTOLIC BLOOD PRESSURE: 60 MMHG | WEIGHT: 135.6 LBS | SYSTOLIC BLOOD PRESSURE: 130 MMHG | BODY MASS INDEX: 19.41 KG/M2 | HEART RATE: 78 BPM | OXYGEN SATURATION: 93 %

## 2025-08-28 DIAGNOSIS — G70.00 MYASTHENIA GRAVIS WITHOUT EXACERBATION: Primary | ICD-10-CM

## 2025-08-28 DIAGNOSIS — G31.84 MILD COGNITIVE IMPAIRMENT: ICD-10-CM

## 2025-08-28 DIAGNOSIS — R26.89 BALANCE DISORDER: ICD-10-CM

## 2025-08-28 RX ORDER — PREDNISONE 20 MG/1
20 TABLET ORAL DAILY
Qty: 30 TABLET | Refills: 5 | Status: SHIPPED | OUTPATIENT
Start: 2025-08-28

## 2025-08-28 RX ORDER — POTASSIUM CHLORIDE 1500 MG/1
TABLET, EXTENDED RELEASE ORAL
COMMUNITY
Start: 2025-08-26

## 2025-08-28 RX ORDER — DONEPEZIL HYDROCHLORIDE 5 MG/1
5 TABLET, FILM COATED ORAL NIGHTLY
Qty: 30 TABLET | Refills: 5 | Status: SHIPPED | OUTPATIENT
Start: 2025-08-28 | End: 2026-08-28

## 2025-08-28 RX ORDER — LOSARTAN POTASSIUM 25 MG/1
TABLET ORAL
COMMUNITY
Start: 2025-08-26

## 2025-08-28 RX ORDER — PYRIDOSTIGMINE BROMIDE 60 MG/1
60 TABLET ORAL 3 TIMES DAILY
Qty: 90 TABLET | Refills: 5 | Status: SHIPPED | OUTPATIENT
Start: 2025-08-28 | End: 2026-08-28

## (undated) DEVICE — DRSNG PAD ABD 8X10IN STRL

## (undated) DEVICE — Device

## (undated) DEVICE — INTENDED FOR TISSUE SEPARATION, AND OTHER PROCEDURES THAT REQUIRE A SHARP SURGICAL BLADE TO PUNCTURE OR CUT.: Brand: BARD-PARKER ® SAFETYLOCK CARBON RIB-BACK BLADES

## (undated) DEVICE — SPNG GZ WOVN 4X4IN 12PLY 10/BX STRL

## (undated) DEVICE — PENCL SMOKE/EVAC MEGADYNE TELESCP 10FT

## (undated) DEVICE — UNDERCAST PADDING: Brand: DEROYAL

## (undated) DEVICE — HANDPIECE SET WITH HIGH FLOW TIP AND SUCTION TUBE: Brand: INTERPULSE

## (undated) DEVICE — GLV SURG SENSICARE PI ORTHO SZ7.5 LF STRL

## (undated) DEVICE — STRAP POSTN KN/BDY FM 5X72IN DISP

## (undated) DEVICE — PK EXTREM LOWR 10

## (undated) DEVICE — TRAP FLD MINIVAC MEGADYNE 100ML

## (undated) DEVICE — BLANKT WARM UPPR/BDY ARM/OUT 57X196CM

## (undated) DEVICE — SUT MNCRYL 2/0 SH 27IN UD MCP417H

## (undated) DEVICE — DRESSING,GAUZE,XEROFORM,CURAD,1"X8",ST: Brand: CURAD

## (undated) DEVICE — SUT PROLN 0 CT2 MONO 30IN 8412H

## (undated) DEVICE — PAD,ARMBOARD,CONV,FOAM,2X8X20",12PR/CS: Brand: MEDLINE

## (undated) DEVICE — PATIENT RETURN ELECTRODE, SINGLE-USE, CONTACT QUALITY MONITORING, ADULT, WITH 9FT CORD, FOR PATIENTS WEIGING OVER 33LBS. (15KG): Brand: MEGADYNE

## (undated) DEVICE — DRAPE,TOP,102X53,STERILE: Brand: MEDLINE

## (undated) DEVICE — UNDERGLV SURG BIOGEL INDICATOR LF PF 7.5

## (undated) DEVICE — IMPLANTABLE DEVICE
Type: IMPLANTABLE DEVICE | Site: ANKLE | Status: NON-FUNCTIONAL
Removed: 2025-05-09

## (undated) DEVICE — BIT DRL TI 2.5MM

## (undated) DEVICE — SUT ETHLN 2/0 PS 18IN 585H

## (undated) DEVICE — SUT ETHLN 3/0 PC5 18IN 1893G

## (undated) DEVICE — APPL CHLORAPREP TINTED 26ML TEAL

## (undated) DEVICE — C-ARM DRAPE: Brand: DEROYAL

## (undated) DEVICE — SPNG LAP PREWSH SFTPK 18X18IN STRL PK/5

## (undated) DEVICE — BIT DRL 3.0MM

## (undated) DEVICE — GOWN,SIRUS,NONRNF,SETINSLV,XL,20/CS: Brand: MEDLINE

## (undated) DEVICE — TAPE,CLOTH/SILK,CURAD,3"X10YD,LF,40/CS: Brand: CURAD

## (undated) DEVICE — 450 ML BOTTLE OF 0.05% CHLORHEXIDINE GLUCONATE IN 99.95% STERILE WATER FOR IRRIGATION, USP AND APPLICATOR.: Brand: IRRISEPT ANTIMICROBIAL WOUND LAVAGE

## (undated) DEVICE — BNDG ELAS CO-FLEX SLF ADHR 4IN5YD LF STRL

## (undated) DEVICE — BNDG ELAS CO-FLEX SLF ADHR 6IN 5YD LF STRL

## (undated) DEVICE — SUT PROLN 2/0 PC3 8833H

## (undated) DEVICE — KT PUMP INFUBLOCK MDL 2100 PMKITSOLIS

## (undated) DEVICE — PIN FIX TEMP BBTAK

## (undated) DEVICE — DRP C/ARMOR

## (undated) DEVICE — INTENDED FOR TISSUE SEPARATION, AND OTHER PROCEDURES THAT REQUIRE A SHARP SURGICAL BLADE TO PUNCTURE OR CUT.: Brand: BARD-PARKER ® STAINLESS STEEL BLADES